# Patient Record
Sex: FEMALE | URBAN - METROPOLITAN AREA
[De-identification: names, ages, dates, MRNs, and addresses within clinical notes are randomized per-mention and may not be internally consistent; named-entity substitution may affect disease eponyms.]

---

## 2019-08-26 ENCOUNTER — TELEPHONE (OUTPATIENT)
Dept: ORTHOPEDICS | Facility: CLINIC | Age: 49
End: 2019-08-26

## 2019-08-26 NOTE — TELEPHONE ENCOUNTER
----- Message from Letha Cazares sent at 8/26/2019 10:40 AM CDT -----  Contact: Tracey from [facility] in [place]  Tracey called and is needing he OP report from when the above patient had surgery with DR Jauregui.    Tracey # [phone] ext [phone]    Fax # [phone] ATTN Tracey

## 2023-03-05 PROBLEM — G43.909 MIGRAINES: Status: ACTIVE | Noted: 2023-03-05

## 2023-03-05 PROBLEM — M62.838 MUSCLE SPASM: Status: ACTIVE | Noted: 2023-03-05

## 2023-03-05 PROBLEM — G25.81 RESTLESS LEGS: Status: ACTIVE | Noted: 2017-04-20

## 2023-03-05 PROBLEM — F32.9 CHRONIC MAJOR DEPRESSIVE DISORDER: Status: ACTIVE | Noted: 2023-03-05

## 2023-03-05 PROBLEM — M51.36 DEGENERATION OF LUMBAR INTERVERTEBRAL DISC: Status: ACTIVE | Noted: 2017-04-20

## 2023-03-05 PROBLEM — M51.369 DEGENERATION OF LUMBAR INTERVERTEBRAL DISC: Status: ACTIVE | Noted: 2017-04-20

## 2023-03-05 RX ORDER — GABAPENTIN 600 MG/1
1200 TABLET ORAL 3 TIMES DAILY
COMMUNITY
End: 2023-08-21 | Stop reason: SDUPTHER

## 2023-03-05 RX ORDER — TIZANIDINE 2 MG/1
2 TABLET ORAL EVERY 6 HOURS PRN
COMMUNITY
End: 2023-08-21 | Stop reason: SDUPTHER

## 2023-03-05 RX ORDER — PAROXETINE HYDROCHLORIDE 20 MG/1
20 TABLET, FILM COATED ORAL EVERY MORNING
COMMUNITY
Start: 2020-05-13 | End: 2023-08-21 | Stop reason: SDUPTHER

## 2023-03-05 RX ORDER — DULOXETIN HYDROCHLORIDE 30 MG/1
30 CAPSULE, DELAYED RELEASE ORAL DAILY
COMMUNITY
End: 2023-08-21 | Stop reason: SDUPTHER

## 2023-03-05 RX ORDER — ROPINIROLE 1 MG/1
1 TABLET, FILM COATED ORAL NIGHTLY
COMMUNITY
Start: 2018-05-19 | End: 2023-08-21 | Stop reason: SDUPTHER

## 2023-04-05 ENCOUNTER — APPOINTMENT (OUTPATIENT)
Dept: PRIMARY CARE | Facility: CLINIC | Age: 53
End: 2023-04-05
Payer: COMMERCIAL

## 2023-08-21 ENCOUNTER — OFFICE VISIT (OUTPATIENT)
Dept: PRIMARY CARE | Facility: CLINIC | Age: 53
End: 2023-08-21
Payer: COMMERCIAL

## 2023-08-21 ENCOUNTER — LAB (OUTPATIENT)
Dept: LAB | Facility: LAB | Age: 53
End: 2023-08-21
Payer: COMMERCIAL

## 2023-08-21 VITALS
BODY MASS INDEX: 32.75 KG/M2 | WEIGHT: 228.8 LBS | HEART RATE: 96 BPM | TEMPERATURE: 97.4 F | DIASTOLIC BLOOD PRESSURE: 68 MMHG | HEIGHT: 70 IN | OXYGEN SATURATION: 97 % | SYSTOLIC BLOOD PRESSURE: 110 MMHG

## 2023-08-21 DIAGNOSIS — G43.809 OTHER MIGRAINE WITHOUT STATUS MIGRAINOSUS, NOT INTRACTABLE: ICD-10-CM

## 2023-08-21 DIAGNOSIS — M51.36 DEGENERATION OF LUMBAR INTERVERTEBRAL DISC: ICD-10-CM

## 2023-08-21 DIAGNOSIS — N30.01 ACUTE CYSTITIS WITH HEMATURIA: ICD-10-CM

## 2023-08-21 DIAGNOSIS — N20.0 NEPHROLITHIASIS: ICD-10-CM

## 2023-08-21 DIAGNOSIS — Z12.31 ENCOUNTER FOR SCREENING MAMMOGRAM FOR MALIGNANT NEOPLASM OF BREAST: ICD-10-CM

## 2023-08-21 DIAGNOSIS — M62.838 MUSCLE SPASM: ICD-10-CM

## 2023-08-21 DIAGNOSIS — Z00.00 ANNUAL PHYSICAL EXAM: ICD-10-CM

## 2023-08-21 DIAGNOSIS — N30.01 ACUTE CYSTITIS WITH HEMATURIA: Primary | ICD-10-CM

## 2023-08-21 DIAGNOSIS — F32.9 CHRONIC MAJOR DEPRESSIVE DISORDER: ICD-10-CM

## 2023-08-21 LAB
ALANINE AMINOTRANSFERASE (SGPT) (U/L) IN SER/PLAS: 16 U/L (ref 7–45)
ALBUMIN (G/DL) IN SER/PLAS: 4.9 G/DL (ref 3.4–5)
ALKALINE PHOSPHATASE (U/L) IN SER/PLAS: 81 U/L (ref 33–110)
ANION GAP IN SER/PLAS: 13 MMOL/L (ref 10–20)
APPEARANCE, URINE: NORMAL
ASPARTATE AMINOTRANSFERASE (SGOT) (U/L) IN SER/PLAS: 16 U/L (ref 9–39)
BASOPHILS (10*3/UL) IN BLOOD BY AUTOMATED COUNT: 0.05 X10E9/L (ref 0–0.1)
BASOPHILS/100 LEUKOCYTES IN BLOOD BY AUTOMATED COUNT: 0.6 % (ref 0–2)
BILIRUBIN TOTAL (MG/DL) IN SER/PLAS: 0.6 MG/DL (ref 0–1.2)
BILIRUBIN, URINE: NEGATIVE
BLOOD, URINE: NEGATIVE
CALCIUM (MG/DL) IN SER/PLAS: 9.7 MG/DL (ref 8.6–10.3)
CARBON DIOXIDE, TOTAL (MMOL/L) IN SER/PLAS: 29 MMOL/L (ref 21–32)
CHLORIDE (MMOL/L) IN SER/PLAS: 101 MMOL/L (ref 98–107)
CHOLESTEROL (MG/DL) IN SER/PLAS: 252 MG/DL (ref 0–199)
CHOLESTEROL IN HDL (MG/DL) IN SER/PLAS: 49.6 MG/DL
CHOLESTEROL/HDL RATIO: 5.1
COLOR, URINE: NORMAL
CREATININE (MG/DL) IN SER/PLAS: 0.95 MG/DL (ref 0.5–1.05)
EOSINOPHILS (10*3/UL) IN BLOOD BY AUTOMATED COUNT: 0.15 X10E9/L (ref 0–0.7)
EOSINOPHILS/100 LEUKOCYTES IN BLOOD BY AUTOMATED COUNT: 1.8 % (ref 0–6)
ERYTHROCYTE DISTRIBUTION WIDTH (RATIO) BY AUTOMATED COUNT: 13.2 % (ref 11.5–14.5)
ERYTHROCYTE MEAN CORPUSCULAR HEMOGLOBIN CONCENTRATION (G/DL) BY AUTOMATED: 32.8 G/DL (ref 32–36)
ERYTHROCYTE MEAN CORPUSCULAR VOLUME (FL) BY AUTOMATED COUNT: 93 FL (ref 80–100)
ERYTHROCYTES (10*6/UL) IN BLOOD BY AUTOMATED COUNT: 4.35 X10E12/L (ref 4–5.2)
GFR FEMALE: 71 ML/MIN/1.73M2
GLUCOSE (MG/DL) IN SER/PLAS: 95 MG/DL (ref 74–99)
GLUCOSE, URINE: NEGATIVE MG/DL
HEMATOCRIT (%) IN BLOOD BY AUTOMATED COUNT: 40.6 % (ref 36–46)
HEMOGLOBIN (G/DL) IN BLOOD: 13.3 G/DL (ref 12–16)
IMMATURE GRANULOCYTES/100 LEUKOCYTES IN BLOOD BY AUTOMATED COUNT: 0.1 % (ref 0–0.9)
KETONES, URINE: NEGATIVE MG/DL
LDL: 171 MG/DL (ref 0–99)
LEUKOCYTE ESTERASE, URINE: NEGATIVE
LEUKOCYTES (10*3/UL) IN BLOOD BY AUTOMATED COUNT: 8.4 X10E9/L (ref 4.4–11.3)
LYMPHOCYTES (10*3/UL) IN BLOOD BY AUTOMATED COUNT: 3.4 X10E9/L (ref 1.2–4.8)
LYMPHOCYTES/100 LEUKOCYTES IN BLOOD BY AUTOMATED COUNT: 40.5 % (ref 13–44)
MONOCYTES (10*3/UL) IN BLOOD BY AUTOMATED COUNT: 0.41 X10E9/L (ref 0.1–1)
MONOCYTES/100 LEUKOCYTES IN BLOOD BY AUTOMATED COUNT: 4.9 % (ref 2–10)
NEUTROPHILS (10*3/UL) IN BLOOD BY AUTOMATED COUNT: 4.37 X10E9/L (ref 1.2–7.7)
NEUTROPHILS/100 LEUKOCYTES IN BLOOD BY AUTOMATED COUNT: 52.1 % (ref 40–80)
NITRITE, URINE: NEGATIVE
PH, URINE: 5 (ref 5–8)
PLATELETS (10*3/UL) IN BLOOD AUTOMATED COUNT: 409 X10E9/L (ref 150–450)
POTASSIUM (MMOL/L) IN SER/PLAS: 4.6 MMOL/L (ref 3.5–5.3)
PROTEIN TOTAL: 7.8 G/DL (ref 6.4–8.2)
PROTEIN, URINE: NEGATIVE MG/DL
SODIUM (MMOL/L) IN SER/PLAS: 138 MMOL/L (ref 136–145)
SPECIFIC GRAVITY, URINE: 1.02 (ref 1–1.03)
THYROTROPIN (MIU/L) IN SER/PLAS BY DETECTION LIMIT <= 0.05 MIU/L: 0.75 MIU/L (ref 0.44–3.98)
TRIGLYCERIDE (MG/DL) IN SER/PLAS: 159 MG/DL (ref 0–149)
UREA NITROGEN (MG/DL) IN SER/PLAS: 17 MG/DL (ref 6–23)
UROBILINOGEN, URINE: <2 MG/DL (ref 0–1.9)
VLDL: 32 MG/DL (ref 0–40)

## 2023-08-21 PROCEDURE — 84443 ASSAY THYROID STIM HORMONE: CPT

## 2023-08-21 PROCEDURE — 81003 URINALYSIS AUTO W/O SCOPE: CPT

## 2023-08-21 PROCEDURE — 82306 VITAMIN D 25 HYDROXY: CPT

## 2023-08-21 PROCEDURE — 99214 OFFICE O/P EST MOD 30 MIN: CPT | Performed by: INTERNAL MEDICINE

## 2023-08-21 PROCEDURE — 36415 COLL VENOUS BLD VENIPUNCTURE: CPT

## 2023-08-21 PROCEDURE — 1036F TOBACCO NON-USER: CPT | Performed by: INTERNAL MEDICINE

## 2023-08-21 PROCEDURE — 85025 COMPLETE CBC W/AUTO DIFF WBC: CPT

## 2023-08-21 PROCEDURE — 80053 COMPREHEN METABOLIC PANEL: CPT

## 2023-08-21 PROCEDURE — 83036 HEMOGLOBIN GLYCOSYLATED A1C: CPT

## 2023-08-21 PROCEDURE — 80061 LIPID PANEL: CPT

## 2023-08-21 RX ORDER — DULOXETIN HYDROCHLORIDE 30 MG/1
30 CAPSULE, DELAYED RELEASE ORAL DAILY
Qty: 90 CAPSULE | Refills: 0 | Status: SHIPPED | OUTPATIENT
Start: 2023-08-21 | End: 2023-09-18 | Stop reason: SDUPTHER

## 2023-08-21 RX ORDER — GABAPENTIN 600 MG/1
1200 TABLET ORAL 3 TIMES DAILY
Qty: 90 TABLET | Refills: 0 | Status: SHIPPED | OUTPATIENT
Start: 2023-08-21 | End: 2023-09-18 | Stop reason: SDUPTHER

## 2023-08-21 RX ORDER — ROPINIROLE 1 MG/1
1 TABLET, FILM COATED ORAL NIGHTLY
Qty: 90 TABLET | Refills: 0 | Status: SHIPPED | OUTPATIENT
Start: 2023-08-21 | End: 2023-09-25 | Stop reason: SDUPTHER

## 2023-08-21 RX ORDER — TIZANIDINE 2 MG/1
2 TABLET ORAL EVERY 6 HOURS PRN
Qty: 30 TABLET | Refills: 0 | Status: SHIPPED | OUTPATIENT
Start: 2023-08-21 | End: 2023-09-18 | Stop reason: SDUPTHER

## 2023-08-21 RX ORDER — AMOXICILLIN AND CLAVULANATE POTASSIUM 875; 125 MG/1; MG/1
875 TABLET, FILM COATED ORAL 2 TIMES DAILY
Qty: 20 TABLET | Refills: 0 | Status: SHIPPED | OUTPATIENT
Start: 2023-08-21 | End: 2023-08-31

## 2023-08-21 RX ORDER — PAROXETINE HYDROCHLORIDE 20 MG/1
20 TABLET, FILM COATED ORAL EVERY MORNING
Qty: 90 TABLET | Refills: 0 | Status: SHIPPED | OUTPATIENT
Start: 2023-08-21 | End: 2023-09-25 | Stop reason: ALTCHOICE

## 2023-08-21 ASSESSMENT — ENCOUNTER SYMPTOMS
ARTHRALGIAS: 0
DEPRESSION: 0
BRUISES/BLEEDS EASILY: 0
ABDOMINAL PAIN: 0
DIZZINESS: 0
OCCASIONAL FEELINGS OF UNSTEADINESS: 0
HEMATURIA: 1
DIFFICULTY URINATING: 0
PALPITATIONS: 0
BACK PAIN: 1
WHEEZING: 0
FATIGUE: 0
COUGH: 0
SINUS PAIN: 0
SORE THROAT: 0
DIARRHEA: 0
LOSS OF SENSATION IN FEET: 0
FEVER: 1
BLOOD IN STOOL: 0
UNEXPECTED WEIGHT CHANGE: 0
HEADACHES: 0

## 2023-08-21 ASSESSMENT — PATIENT HEALTH QUESTIONNAIRE - PHQ9
SUM OF ALL RESPONSES TO PHQ9 QUESTIONS 1 AND 2: 0
1. LITTLE INTEREST OR PLEASURE IN DOING THINGS: NOT AT ALL
2. FEELING DOWN, DEPRESSED OR HOPELESS: NOT AT ALL

## 2023-08-21 NOTE — PROGRESS NOTES
Subjective   Patient ID: Rebecca Saenz is a 52 y.o. female who presents for Fever (Since yesterday), Blood in Urine (Peeing constantly), and Back Pain (Not the usual).      -- History of nephrolithiasis, patient, comes today complaining of left flank pain hematuria since morning suprapubic pain and low-grade fever  -UTI and hematuria we will start patient on Augmentin  Obtain urine analysis and reflex culture if needed  - History of nephrolithiasis send patient for CT scanning follow-up results closely  Results came back negative except for right tiny stone not correlating with the clinical presentation  - Chronic depression on paroxetine 40 mg for many years not controlled  Fatigue and tiredness increased anxiety  -Chronic lumbosacral pain and lumbar radiculopathy continue with gabapentin follow-up pain management  -History of back surgery x5 uncontrolled continue with physical therapy pain management continue current medication  - History of hysterectomy and bilateral oophorectomy more than 20 years ago not on any hormonal replacement need to continue with calcium and vitamin D at this time  Follow-up lab results closely  Needs complete blood work mammogram follow-up 4 weeks physical exam    Fever   Pertinent negatives include no abdominal pain, chest pain, congestion, coughing, diarrhea, ear pain, headaches, rash, sore throat or wheezing.   Blood in Urine  Associated symptoms include fever. Pertinent negatives include no abdominal pain.   Back Pain  Associated symptoms include a fever. Pertinent negatives include no abdominal pain, chest pain or headaches.          Review of Systems   Constitutional:  Positive for fever. Negative for fatigue and unexpected weight change.   HENT:  Negative for congestion, ear discharge, ear pain, mouth sores, sinus pain and sore throat.    Eyes:  Negative for visual disturbance.   Respiratory:  Negative for cough and wheezing.    Cardiovascular:  Negative for chest pain,  "palpitations and leg swelling.   Gastrointestinal:  Negative for abdominal pain, blood in stool and diarrhea.   Genitourinary:  Positive for hematuria. Negative for difficulty urinating.   Musculoskeletal:  Positive for back pain. Negative for arthralgias.   Skin:  Negative for rash.   Neurological:  Negative for dizziness and headaches.   Hematological:  Does not bruise/bleed easily.   Psychiatric/Behavioral:  Negative for behavioral problems.    All other systems reviewed and are negative.      Objective   Lab Results   Component Value Date    HGBA1C 5.4 12/05/2017      /68   Pulse 96   Temp 36.3 °C (97.4 °F)   Ht 1.765 m (5' 9.5\")   Wt 104 kg (228 lb 12.8 oz)   SpO2 97%   BMI 33.30 kg/m²   Lab Results   Component Value Date    WBC 10.3 06/15/2018    HGB 12.5 06/15/2018    HCT 38.7 06/15/2018     06/15/2018    ALT 14 06/15/2018    AST 15 06/15/2018     06/15/2018    K 5.1 06/15/2018     06/15/2018    CREATININE 0.90 06/15/2018    BUN 17 06/15/2018    CO2 30 06/15/2018    TSH 0.46 12/05/2017    INR 1.0 06/15/2018    HGBA1C 5.4 12/05/2017     par   Physical Exam  Vitals and nursing note reviewed.   Constitutional:       Appearance: Normal appearance.   HENT:      Head: Normocephalic.      Nose: Nose normal.   Eyes:      Conjunctiva/sclera: Conjunctivae normal.      Pupils: Pupils are equal, round, and reactive to light.   Cardiovascular:      Rate and Rhythm: Regular rhythm.   Pulmonary:      Effort: Pulmonary effort is normal.      Breath sounds: Normal breath sounds.   Abdominal:      General: Abdomen is flat.      Palpations: Abdomen is soft.      Tenderness: There is left CVA tenderness.   Musculoskeletal:         General: Tenderness (Lumbosacral tenderness) present.      Cervical back: Neck supple.   Skin:     General: Skin is warm.   Neurological:      General: No focal deficit present.      Mental Status: She is oriented to person, place, and time.   Psychiatric:         Mood " and Affect: Mood normal.         Assessment/Plan   Rebecca was seen today for fever, blood in urine and back pain.  Diagnoses and all orders for this visit:  Acute cystitis with hematuria (Primary)  -     Urinalysis with Reflex Microscopic and Culture; Future  Chronic major depressive disorder  -     DULoxetine (Cymbalta) 30 mg DR capsule; Take 1 capsule (30 mg) by mouth once daily. Do not crush or chew.  -     PARoxetine (Paxil) 20 mg tablet; Take 1 tablet (20 mg) by mouth once daily in the morning.  -     rOPINIRole (Requip) 1 mg tablet; Take 1 tablet (1 mg) by mouth once daily at bedtime.  Muscle spasm  Degeneration of lumbar intervertebral disc  -     gabapentin (Neurontin) 600 mg tablet; Take 2 tablets (1,200 mg) by mouth 3 times a day.  -     tiZANidine (Zanaflex) 2 mg tablet; Take 1 tablet (2 mg) by mouth every 6 hours if needed for muscle spasms.  Other migraine without status migrainosus, not intractable  Annual physical exam  -     amoxicillin-pot clavulanate (Augmentin) 875-125 mg tablet; Take 1 tablet (875 mg) by mouth 2 times a day for 10 days.  -     CBC and Auto Differential; Future  -     Comprehensive Metabolic Panel; Future  -     Hemoglobin A1C; Future  -     Lipid Panel; Future  -     TSH with reflex to Free T4 if abnormal; Future  -     Vitamin D, Total; Future  Encounter for screening mammogram for malignant neoplasm of breast  -     BI mammo bilateral screening tomosynthesis; Future  Nephrolithiasis  -     CT abdomen pelvis wo IV contrast; Future  Other orders  -     Follow Up In Primary Care - Health Maintenance; Future   - History of nephrolithiasis, patient, comes today complaining of left flank pain hematuria since morning suprapubic pain and low-grade fever  -UTI and hematuria we will start patient on Augmentin  Obtain urine analysis and reflex culture if needed  - History of nephrolithiasis send patient for CT scanning follow-up results closely  Results came back negative except for right  tiny stone not correlating with the clinical presentation  - Chronic depression on paroxetine 40 mg for many years not controlled  Fatigue and tiredness increased anxiety  -Chronic lumbosacral pain and lumbar radiculopathy continue with gabapentin follow-up pain management  -History of back surgery x5 uncontrolled continue with physical therapy pain management continue current medication  - History of hysterectomy and bilateral oophorectomy more than 20 years ago not on any hormonal replacement need to continue with calcium and vitamin D at this time  Follow-up lab results closely  Needs complete blood work mammogram follow-up 4 weeks physical exam

## 2023-08-22 LAB
CALCIDIOL (25 OH VITAMIN D3) (NG/ML) IN SER/PLAS: 44 NG/ML
ESTIMATED AVERAGE GLUCOSE FOR HBA1C: 120 MG/DL
HEMOGLOBIN A1C/HEMOGLOBIN TOTAL IN BLOOD: 5.8 %

## 2023-08-22 NOTE — RESULT ENCOUNTER NOTE
-Your CAT scan showed small tiny stone not obstructing on the right kidney not correlating with your symptoms, left kidney is normal  Continue with antibiotic until urine culture results    -Your blood work showed very high cholesterol need to continue with low-fat diet weight loss until reevaluation next appointment

## 2023-09-18 DIAGNOSIS — M51.36 DEGENERATION OF LUMBAR INTERVERTEBRAL DISC: ICD-10-CM

## 2023-09-18 DIAGNOSIS — F32.9 CHRONIC MAJOR DEPRESSIVE DISORDER: ICD-10-CM

## 2023-09-18 RX ORDER — GABAPENTIN 600 MG/1
1200 TABLET ORAL 3 TIMES DAILY
Qty: 180 TABLET | Refills: 0 | Status: SHIPPED | OUTPATIENT
Start: 2023-09-18 | End: 2023-09-25 | Stop reason: SDUPTHER

## 2023-09-18 RX ORDER — TIZANIDINE 2 MG/1
TABLET ORAL
Qty: 60 TABLET | Refills: 0 | Status: SHIPPED | OUTPATIENT
Start: 2023-09-18 | End: 2023-09-25 | Stop reason: ALTCHOICE

## 2023-09-18 RX ORDER — DULOXETIN HYDROCHLORIDE 30 MG/1
30 CAPSULE, DELAYED RELEASE ORAL DAILY
Qty: 90 CAPSULE | Refills: 0 | Status: SHIPPED | OUTPATIENT
Start: 2023-09-18 | End: 2023-09-25 | Stop reason: SDUPTHER

## 2023-09-25 ENCOUNTER — OFFICE VISIT (OUTPATIENT)
Dept: PRIMARY CARE | Facility: CLINIC | Age: 53
End: 2023-09-25
Payer: COMMERCIAL

## 2023-09-25 VITALS
DIASTOLIC BLOOD PRESSURE: 78 MMHG | BODY MASS INDEX: 33.01 KG/M2 | TEMPERATURE: 97.2 F | HEART RATE: 84 BPM | SYSTOLIC BLOOD PRESSURE: 128 MMHG | OXYGEN SATURATION: 98 % | WEIGHT: 226.8 LBS

## 2023-09-25 DIAGNOSIS — Z13.6 ENCOUNTER FOR SCREENING FOR CORONARY ARTERY DISEASE: Primary | ICD-10-CM

## 2023-09-25 DIAGNOSIS — F32.9 CHRONIC MAJOR DEPRESSIVE DISORDER: ICD-10-CM

## 2023-09-25 DIAGNOSIS — E78.00 HYPERCHOLESTEREMIA: ICD-10-CM

## 2023-09-25 DIAGNOSIS — M51.36 DEGENERATION OF LUMBAR INTERVERTEBRAL DISC: ICD-10-CM

## 2023-09-25 PROCEDURE — 99214 OFFICE O/P EST MOD 30 MIN: CPT | Performed by: INTERNAL MEDICINE

## 2023-09-25 PROCEDURE — 1036F TOBACCO NON-USER: CPT | Performed by: INTERNAL MEDICINE

## 2023-09-25 RX ORDER — PAROXETINE HYDROCHLORIDE 20 MG/1
20 TABLET, FILM COATED ORAL EVERY MORNING
Qty: 90 TABLET | Refills: 1 | Status: CANCELLED | OUTPATIENT
Start: 2023-09-25

## 2023-09-25 RX ORDER — TIZANIDINE 2 MG/1
TABLET ORAL
Qty: 60 TABLET | Refills: 0 | Status: CANCELLED | OUTPATIENT
Start: 2023-09-25

## 2023-09-25 RX ORDER — CHOLECALCIFEROL (VITAMIN D3) 25 MCG
25 TABLET ORAL DAILY
COMMUNITY

## 2023-09-25 RX ORDER — DULOXETIN HYDROCHLORIDE 60 MG/1
60 CAPSULE, DELAYED RELEASE ORAL 2 TIMES DAILY
Qty: 180 CAPSULE | Refills: 1 | Status: SHIPPED | OUTPATIENT
Start: 2023-09-25 | End: 2024-04-18 | Stop reason: SDUPTHER

## 2023-09-25 RX ORDER — LANOLIN ALCOHOL/MO/W.PET/CERES
100 CREAM (GRAM) TOPICAL DAILY
COMMUNITY

## 2023-09-25 RX ORDER — GABAPENTIN 600 MG/1
1200 TABLET ORAL 3 TIMES DAILY
Qty: 180 TABLET | Refills: 0 | Status: SHIPPED | OUTPATIENT
Start: 2023-09-25 | End: 2023-12-11 | Stop reason: SDUPTHER

## 2023-09-25 RX ORDER — GARLIC 500 MG
CAPSULE ORAL
COMMUNITY

## 2023-09-25 RX ORDER — ASCORBIC ACID 500 MG
500 TABLET ORAL DAILY
COMMUNITY
End: 2023-10-17 | Stop reason: SINTOL

## 2023-09-25 RX ORDER — ROPINIROLE 1 MG/1
1 TABLET, FILM COATED ORAL NIGHTLY
Qty: 90 TABLET | Refills: 1 | Status: SHIPPED | OUTPATIENT
Start: 2023-09-25 | End: 2024-03-19 | Stop reason: SDUPTHER

## 2023-09-25 ASSESSMENT — ENCOUNTER SYMPTOMS
HEADACHES: 0
FEVER: 0
ABDOMINAL PAIN: 0
BLOOD IN STOOL: 0
COUGH: 0
WHEEZING: 0
FATIGUE: 0
PALPITATIONS: 0
SORE THROAT: 0
ARTHRALGIAS: 0
UNEXPECTED WEIGHT CHANGE: 0
BRUISES/BLEEDS EASILY: 0
SINUS PAIN: 0
BACK PAIN: 1
DIZZINESS: 0
DIFFICULTY URINATING: 0
DEPRESSION: 1
DIARRHEA: 0

## 2023-09-25 NOTE — PROGRESS NOTES
Cor pulmonale I can FridaySubjective   Patient ID: Rebecca Saenz is a 52 y.o. female who presents for Spasms, Depression, Nausea (X 4 days), and Flu Vaccine (declined).     - Hypercholesterolemia patient counseled about low-carb diet exercise  Repeat lipid profile in 3 months  Obtain cardiac calcium score for further eval recommendation  -Depression slowly improving need now to increase Cymbalta to 60 mg daily for 4 weeks then twice a day discontinue Paxil  -Restless leg syndrome continue with Requip  -Lumbosacral pain continue with gabapentin discontinue tizanidine due to risk for interaction low blood pressure patient is aware  -Nephrolithiasis increase fluid intake counseled about prevention  -History of back surgery x5 uncontrolled continue with physical therapy pain management continue current medication  - History of hysterectomy and bilateral oophorectomy more than 20 years ago not on any hormonal replacement need to continue with calcium and vitamin D at this time  Follow-up lab results closely  Follow-up 3 months blood work and cardiac score      DepressionPatient is not experiencing: palpitations.             Review of Systems   Constitutional:  Negative for fatigue, fever and unexpected weight change.   HENT:  Negative for congestion, ear discharge, ear pain, mouth sores, sinus pain and sore throat.    Eyes:  Negative for visual disturbance.   Respiratory:  Negative for cough and wheezing.    Cardiovascular:  Negative for chest pain, palpitations and leg swelling.   Gastrointestinal:  Negative for abdominal pain, blood in stool and diarrhea.   Genitourinary:  Negative for difficulty urinating.   Musculoskeletal:  Positive for back pain. Negative for arthralgias.   Skin:  Negative for rash.   Neurological:  Negative for dizziness and headaches.   Hematological:  Does not bruise/bleed easily.   Psychiatric/Behavioral:  Positive for depression. Negative for behavioral problems.    All other systems  reviewed and are negative.      Objective   Lab Results   Component Value Date    HGBA1C 5.8 (A) 08/21/2023      /78   Pulse 84   Temp 36.2 °C (97.2 °F)   Wt 103 kg (226 lb 12.8 oz)   SpO2 98%   BMI 33.01 kg/m²   Lab Results   Component Value Date    WBC 8.4 08/21/2023    HGB 13.3 08/21/2023    HCT 40.6 08/21/2023     08/21/2023    CHOL 252 (H) 08/21/2023    TRIG 159 (H) 08/21/2023    HDL 49.6 08/21/2023    ALT 16 08/21/2023    AST 16 08/21/2023     08/21/2023    K 4.6 08/21/2023     08/21/2023    CREATININE 0.95 08/21/2023    BUN 17 08/21/2023    CO2 29 08/21/2023    TSH 0.75 08/21/2023    INR 1.0 06/15/2018    HGBA1C 5.8 (A) 08/21/2023     par   Physical Exam  Vitals and nursing note reviewed.   Constitutional:       Appearance: Normal appearance.   HENT:      Head: Normocephalic.      Nose: Nose normal.   Eyes:      Conjunctiva/sclera: Conjunctivae normal.      Pupils: Pupils are equal, round, and reactive to light.   Cardiovascular:      Rate and Rhythm: Regular rhythm.   Pulmonary:      Effort: Pulmonary effort is normal.      Breath sounds: Normal breath sounds.   Abdominal:      General: Abdomen is flat.      Palpations: Abdomen is soft.   Musculoskeletal:         General: Tenderness (Lumbosacral tenderness) present.      Cervical back: Neck supple.   Skin:     General: Skin is warm.   Neurological:      General: No focal deficit present.      Mental Status: She is oriented to person, place, and time.   Psychiatric:         Mood and Affect: Mood normal.         Assessment/Plan   Rebecca was seen today for spasms, depression, nausea and flu vaccine.  Diagnoses and all orders for this visit:  Encounter for screening for coronary artery disease (Primary)  -     CT cardiac scoring wo IV contrast; Future  Chronic major depressive disorder  -     DULoxetine (Cymbalta) 60 mg DR capsule; Take 1 capsule (60 mg) by mouth 2 times a day. Do not crush or chew.  -     rOPINIRole (Requip) 1 mg  tablet; Take 1 tablet (1 mg) by mouth once daily at bedtime.  Degeneration of lumbar intervertebral disc  -     gabapentin (Neurontin) 600 mg tablet; Take 2 tablets (1,200 mg) by mouth 3 times a day.  Hypercholesteremia  -     Lipid Panel; Future  Other orders  -     Follow Up In Primary Care - Health Maintenance  -     Follow Up In Primary Care - Established; Future   - Hypercholesterolemia patient counseled about low-carb diet exercise  Repeat lipid profile in 3 months  Obtain cardiac calcium score for further eval recommendation  -Depression slowly improving need now to increase Cymbalta to 60 mg daily for 4 weeks then twice a day discontinue Paxil  -Restless leg syndrome continue with Requip  -Lumbosacral pain continue with gabapentin discontinue tizanidine due to risk for interaction low blood pressure patient is aware  -Nephrolithiasis increase fluid intake counseled about prevention  -History of back surgery x5 uncontrolled continue with physical therapy pain management continue current medication  - History of hysterectomy and bilateral oophorectomy more than 20 years ago not on any hormonal replacement need to continue with calcium and vitamin D at this time  Follow-up lab results closely  Follow-up 3 months blood work and cardiac score

## 2023-10-17 ENCOUNTER — OFFICE VISIT (OUTPATIENT)
Dept: PRIMARY CARE | Facility: CLINIC | Age: 53
End: 2023-10-17
Payer: COMMERCIAL

## 2023-10-17 ENCOUNTER — LAB (OUTPATIENT)
Dept: LAB | Facility: LAB | Age: 53
End: 2023-10-17
Payer: COMMERCIAL

## 2023-10-17 ENCOUNTER — APPOINTMENT (OUTPATIENT)
Dept: PRIMARY CARE | Facility: CLINIC | Age: 53
End: 2023-10-17
Payer: COMMERCIAL

## 2023-10-17 ENCOUNTER — HOSPITAL ENCOUNTER (OUTPATIENT)
Dept: RADIOLOGY | Facility: HOSPITAL | Age: 53
Discharge: HOME | End: 2023-10-17
Payer: COMMERCIAL

## 2023-10-17 VITALS
BODY MASS INDEX: 32.58 KG/M2 | TEMPERATURE: 97.7 F | HEART RATE: 104 BPM | WEIGHT: 223.8 LBS | OXYGEN SATURATION: 98 % | SYSTOLIC BLOOD PRESSURE: 126 MMHG | DIASTOLIC BLOOD PRESSURE: 94 MMHG

## 2023-10-17 DIAGNOSIS — M10.9 ACUTE GOUTY ARTHROPATHY: ICD-10-CM

## 2023-10-17 DIAGNOSIS — F32.9 CHRONIC MAJOR DEPRESSIVE DISORDER: ICD-10-CM

## 2023-10-17 DIAGNOSIS — M51.36 DEGENERATION OF LUMBAR INTERVERTEBRAL DISC: ICD-10-CM

## 2023-10-17 DIAGNOSIS — E78.00 HYPERCHOLESTEREMIA: ICD-10-CM

## 2023-10-17 DIAGNOSIS — M10.9 ACUTE GOUTY ARTHROPATHY: Primary | ICD-10-CM

## 2023-10-17 LAB
ANION GAP SERPL CALC-SCNC: 14 MMOL/L (ref 10–20)
BUN SERPL-MCNC: 11 MG/DL (ref 6–23)
CALCIUM SERPL-MCNC: 10.1 MG/DL (ref 8.6–10.3)
CHLORIDE SERPL-SCNC: 99 MMOL/L (ref 98–107)
CO2 SERPL-SCNC: 30 MMOL/L (ref 21–32)
CREAT SERPL-MCNC: 0.97 MG/DL (ref 0.5–1.05)
GFR SERPL CREATININE-BSD FRML MDRD: 70 ML/MIN/1.73M*2
GLUCOSE SERPL-MCNC: 102 MG/DL (ref 74–99)
POTASSIUM SERPL-SCNC: 4.2 MMOL/L (ref 3.5–5.3)
SODIUM SERPL-SCNC: 139 MMOL/L (ref 136–145)
URATE SERPL-MCNC: 3 MG/DL (ref 2.3–6.7)

## 2023-10-17 PROCEDURE — 80048 BASIC METABOLIC PNL TOTAL CA: CPT

## 2023-10-17 PROCEDURE — 73630 X-RAY EXAM OF FOOT: CPT | Mod: RT

## 2023-10-17 PROCEDURE — 84550 ASSAY OF BLOOD/URIC ACID: CPT

## 2023-10-17 PROCEDURE — 36415 COLL VENOUS BLD VENIPUNCTURE: CPT

## 2023-10-17 PROCEDURE — 1036F TOBACCO NON-USER: CPT | Performed by: INTERNAL MEDICINE

## 2023-10-17 PROCEDURE — 99214 OFFICE O/P EST MOD 30 MIN: CPT | Performed by: INTERNAL MEDICINE

## 2023-10-17 PROCEDURE — 73630 X-RAY EXAM OF FOOT: CPT | Mod: RIGHT SIDE | Performed by: RADIOLOGY

## 2023-10-17 RX ORDER — COLCHICINE 0.6 MG/1
0.6 TABLET ORAL DAILY
Qty: 30 TABLET | Refills: 0 | Status: SHIPPED | OUTPATIENT
Start: 2023-10-17 | End: 2023-11-16

## 2023-10-17 ASSESSMENT — ENCOUNTER SYMPTOMS
DIARRHEA: 0
COUGH: 0
BLOOD IN STOOL: 0
ABDOMINAL PAIN: 0
SORE THROAT: 0
DIFFICULTY URINATING: 0
WHEEZING: 0
SINUS PAIN: 0
UNEXPECTED WEIGHT CHANGE: 0
FATIGUE: 0
PALPITATIONS: 0
FEVER: 0
DIZZINESS: 0
HEADACHES: 0
BRUISES/BLEEDS EASILY: 0
ARTHRALGIAS: 1

## 2023-10-17 NOTE — PROGRESS NOTES
Subjective   Patient ID: Rebecca Saenz is a 52 y.o. female who presents for thumb pain (On left, rt toe pain x 2 months,).    - Right foot and right big toe pain swelling for almost 4 weeks unable to stand or walk also now started having left wrist pain  - Examination highly suggestive of acute gouty arthropathy  Need to obtain x-ray of right foot  Uric acid level BMP and CBC  Start patient on Colcrys given instruction about the use and side effect  Reevaluate patient in 4 weeks  - Need to discontinue vitamin C omega-3 and fish oil capsules  Increase fluid intake  - Hypercholesterolemia patient counseled about low-carb diet exercise  Repeat lipid profile in 3 months as recommended  Obtain cardiac calcium score for further eval recommendation made to proceed as recommended  -Depression slowly improving need now to increase Cymbalta to 60 mg daily for 4 weeks then twice a day discontinue Paxil  -Restless leg syndrome continue with Requip  -Lumbosacral pain continue with gabapentin discontinue tizanidine due to risk for interaction low blood pressure patient is aware  -Nephrolithiasis increase fluid intake counseled about prevention  -History of back surgery x5 uncontrolled continue with physical therapy pain management continue current medication  - History of hysterectomy and bilateral oophorectomy more than 20 years ago not on any hormonal replacement need to continue with calcium and vitamin D at this time  Follow-up lab results closely 4 weeks           Review of Systems   Constitutional:  Negative for fatigue, fever and unexpected weight change.   HENT:  Negative for congestion, ear discharge, ear pain, mouth sores, sinus pain and sore throat.    Eyes:  Negative for visual disturbance.   Respiratory:  Negative for cough and wheezing.    Cardiovascular:  Negative for chest pain, palpitations and leg swelling.   Gastrointestinal:  Negative for abdominal pain, blood in stool and diarrhea.   Genitourinary:   Negative for difficulty urinating.   Musculoskeletal:  Positive for arthralgias.   Skin:  Negative for rash.   Neurological:  Negative for dizziness and headaches.   Hematological:  Does not bruise/bleed easily.   Psychiatric/Behavioral:  Negative for behavioral problems.    All other systems reviewed and are negative.      Objective   Lab Results   Component Value Date    HGBA1C 5.8 (A) 08/21/2023      BP (!) 126/94   Pulse 104   Temp 36.5 °C (97.7 °F)   Wt 102 kg (223 lb 12.8 oz)   SpO2 98%   BMI 32.58 kg/m²     Physical Exam  Vitals and nursing note reviewed.   Constitutional:       Appearance: Normal appearance.   HENT:      Head: Normocephalic.      Nose: Nose normal.   Eyes:      Conjunctiva/sclera: Conjunctivae normal.      Pupils: Pupils are equal, round, and reactive to light.   Cardiovascular:      Rate and Rhythm: Regular rhythm.   Pulmonary:      Effort: Pulmonary effort is normal.      Breath sounds: Normal breath sounds.   Abdominal:      General: Abdomen is flat.      Palpations: Abdomen is soft.   Musculoskeletal:         General: Tenderness (Right first metatarsal joint swelling and redness, right hand metacarpophalangeal joint swelling) present.      Cervical back: Neck supple.   Skin:     General: Skin is warm.   Neurological:      General: No focal deficit present.      Mental Status: She is oriented to person, place, and time.   Psychiatric:         Mood and Affect: Mood normal.         Assessment/Plan   Rebecca was seen today for thumb pain.  Diagnoses and all orders for this visit:  Acute gouty arthropathy (Primary)  -     Basic metabolic panel; Future  -     Uric Acid; Future  -     XR foot right 3+ views; Future  -     colchicine, gout, 0.6 mg tablet; Take 1 tablet (0.6 mg) by mouth once daily.  Chronic major depressive disorder  Hypercholesteremia  Degeneration of lumbar intervertebral disc   - Right foot and right big toe pain swelling for almost 4 weeks unable to stand or walk also now  started having left wrist pain  - Examination highly suggestive of acute gouty arthropathy  Need to obtain x-ray of right foot  Uric acid level BMP and CBC  Start patient on Colcrys given instruction about the use and side effect  Reevaluate patient in 4 weeks  - Need to discontinue vitamin C omega-3 and fish oil capsules  Increase fluid intake  - Hypercholesterolemia patient counseled about low-carb diet exercise  Repeat lipid profile in 3 months as recommended  Obtain cardiac calcium score for further eval recommendation made to proceed as recommended  -Depression slowly improving need now to increase Cymbalta to 60 mg daily for 4 weeks then twice a day discontinue Paxil  -Restless leg syndrome continue with Requip  -Lumbosacral pain continue with gabapentin discontinue tizanidine due to risk for interaction low blood pressure patient is aware  -Nephrolithiasis increase fluid intake counseled about prevention  -History of back surgery x5 uncontrolled continue with physical therapy pain management continue current medication  - History of hysterectomy and bilateral oophorectomy more than 20 years ago not on any hormonal replacement need to continue with calcium and vitamin D at this time  Follow-up lab results closely 4 weeks

## 2023-10-24 ENCOUNTER — HOSPITAL ENCOUNTER (OUTPATIENT)
Dept: RADIOLOGY | Facility: HOSPITAL | Age: 53
Discharge: HOME | End: 2023-10-24
Payer: COMMERCIAL

## 2023-10-24 DIAGNOSIS — Z13.6 ENCOUNTER FOR SCREENING FOR CORONARY ARTERY DISEASE: ICD-10-CM

## 2023-10-24 PROCEDURE — 75571 CT HRT W/O DYE W/CA TEST: CPT

## 2023-11-07 DIAGNOSIS — M10.9 ACUTE GOUTY ARTHROPATHY: ICD-10-CM

## 2023-11-27 ENCOUNTER — APPOINTMENT (OUTPATIENT)
Dept: RADIOLOGY | Facility: HOSPITAL | Age: 53
DRG: 690 | End: 2023-11-27
Payer: COMMERCIAL

## 2023-11-27 ENCOUNTER — OFFICE VISIT (OUTPATIENT)
Dept: ORTHOPEDIC SURGERY | Facility: HOSPITAL | Age: 53
End: 2023-11-27
Payer: COMMERCIAL

## 2023-11-27 ENCOUNTER — HOSPITAL ENCOUNTER (OUTPATIENT)
Facility: HOSPITAL | Age: 53
Setting detail: OBSERVATION
Discharge: HOME | DRG: 690 | End: 2023-11-28
Attending: EMERGENCY MEDICINE | Admitting: INTERNAL MEDICINE
Payer: COMMERCIAL

## 2023-11-27 ENCOUNTER — HOSPITAL ENCOUNTER (OUTPATIENT)
Dept: RADIOLOGY | Facility: HOSPITAL | Age: 53
Discharge: HOME | End: 2023-11-27
Payer: COMMERCIAL

## 2023-11-27 VITALS — WEIGHT: 220 LBS | BODY MASS INDEX: 30.8 KG/M2 | HEIGHT: 71 IN

## 2023-11-27 DIAGNOSIS — M54.50 LUMBAR PAIN WITH RADIATION DOWN LEFT LEG: ICD-10-CM

## 2023-11-27 DIAGNOSIS — M54.16 LUMBAR RADICULOPATHY: ICD-10-CM

## 2023-11-27 DIAGNOSIS — R32 URINARY INCONTINENCE, UNSPECIFIED TYPE: Primary | ICD-10-CM

## 2023-11-27 DIAGNOSIS — M79.605 LUMBAR PAIN WITH RADIATION DOWN LEFT LEG: ICD-10-CM

## 2023-11-27 LAB
BASOPHILS # BLD AUTO: 0.06 X10*3/UL (ref 0–0.1)
BASOPHILS NFR BLD AUTO: 0.7 %
EOSINOPHIL # BLD AUTO: 0.26 X10*3/UL (ref 0–0.7)
EOSINOPHIL NFR BLD AUTO: 3 %
ERYTHROCYTE [DISTWIDTH] IN BLOOD BY AUTOMATED COUNT: 13.8 % (ref 11.5–14.5)
HCT VFR BLD AUTO: 36.2 % (ref 36–46)
HGB BLD-MCNC: 12.1 G/DL (ref 12–16)
IMM GRANULOCYTES # BLD AUTO: 0.02 X10*3/UL (ref 0–0.7)
IMM GRANULOCYTES NFR BLD AUTO: 0.2 % (ref 0–0.9)
LYMPHOCYTES # BLD AUTO: 4.21 X10*3/UL (ref 1.2–4.8)
LYMPHOCYTES NFR BLD AUTO: 49.1 %
MCH RBC QN AUTO: 30.5 PG (ref 26–34)
MCHC RBC AUTO-ENTMCNC: 33.4 G/DL (ref 32–36)
MCV RBC AUTO: 91 FL (ref 80–100)
MONOCYTES # BLD AUTO: 0.55 X10*3/UL (ref 0.1–1)
MONOCYTES NFR BLD AUTO: 6.4 %
NEUTROPHILS # BLD AUTO: 3.47 X10*3/UL (ref 1.2–7.7)
NEUTROPHILS NFR BLD AUTO: 40.6 %
NRBC BLD-RTO: 0 /100 WBCS (ref 0–0)
PLATELET # BLD AUTO: 339 X10*3/UL (ref 150–450)
RBC # BLD AUTO: 3.97 X10*6/UL (ref 4–5.2)
WBC # BLD AUTO: 8.6 X10*3/UL (ref 4.4–11.3)

## 2023-11-27 PROCEDURE — 87086 URINE CULTURE/COLONY COUNT: CPT | Mod: AHULAB | Performed by: EMERGENCY MEDICINE

## 2023-11-27 PROCEDURE — G0378 HOSPITAL OBSERVATION PER HR: HCPCS

## 2023-11-27 PROCEDURE — 80053 COMPREHEN METABOLIC PANEL: CPT | Performed by: EMERGENCY MEDICINE

## 2023-11-27 PROCEDURE — 85025 COMPLETE CBC W/AUTO DIFF WBC: CPT | Performed by: EMERGENCY MEDICINE

## 2023-11-27 PROCEDURE — 2500000004 HC RX 250 GENERAL PHARMACY W/ HCPCS (ALT 636 FOR OP/ED): Performed by: PHYSICIAN ASSISTANT

## 2023-11-27 PROCEDURE — 99285 EMERGENCY DEPT VISIT HI MDM: CPT | Performed by: EMERGENCY MEDICINE

## 2023-11-27 PROCEDURE — 72146 MRI CHEST SPINE W/O DYE: CPT

## 2023-11-27 PROCEDURE — 99213 OFFICE O/P EST LOW 20 MIN: CPT | Performed by: PHYSICIAN ASSISTANT

## 2023-11-27 PROCEDURE — 81001 URINALYSIS AUTO W/SCOPE: CPT | Performed by: EMERGENCY MEDICINE

## 2023-11-27 PROCEDURE — 72110 X-RAY EXAM L-2 SPINE 4/>VWS: CPT | Performed by: RADIOLOGY

## 2023-11-27 PROCEDURE — 36415 COLL VENOUS BLD VENIPUNCTURE: CPT | Performed by: EMERGENCY MEDICINE

## 2023-11-27 PROCEDURE — 99203 OFFICE O/P NEW LOW 30 MIN: CPT | Performed by: PHYSICIAN ASSISTANT

## 2023-11-27 PROCEDURE — 72146 MRI CHEST SPINE W/O DYE: CPT | Performed by: STUDENT IN AN ORGANIZED HEALTH CARE EDUCATION/TRAINING PROGRAM

## 2023-11-27 PROCEDURE — 1036F TOBACCO NON-USER: CPT | Performed by: PHYSICIAN ASSISTANT

## 2023-11-27 PROCEDURE — 72110 X-RAY EXAM L-2 SPINE 4/>VWS: CPT | Mod: FY

## 2023-11-27 RX ORDER — KETOROLAC TROMETHAMINE 30 MG/ML
15 INJECTION, SOLUTION INTRAMUSCULAR; INTRAVENOUS ONCE
Status: COMPLETED | OUTPATIENT
Start: 2023-11-27 | End: 2023-11-27

## 2023-11-27 RX ORDER — ORPHENADRINE CITRATE 30 MG/ML
30 INJECTION INTRAMUSCULAR; INTRAVENOUS ONCE
Status: COMPLETED | OUTPATIENT
Start: 2023-11-27 | End: 2023-11-27

## 2023-11-27 RX ORDER — LIDOCAINE 560 MG/1
1 PATCH PERCUTANEOUS; TOPICAL; TRANSDERMAL DAILY
Status: DISCONTINUED | OUTPATIENT
Start: 2023-11-27 | End: 2023-11-28 | Stop reason: HOSPADM

## 2023-11-27 RX ADMIN — ORPHENADRINE CITRATE 30 MG: 60 INJECTION INTRAMUSCULAR; INTRAVENOUS at 17:57

## 2023-11-27 RX ADMIN — KETOROLAC TROMETHAMINE 15 MG: 30 INJECTION, SOLUTION INTRAMUSCULAR; INTRAVENOUS at 17:57

## 2023-11-27 RX ADMIN — HYDROMORPHONE HYDROCHLORIDE 0.4 MG: 1 INJECTION, SOLUTION INTRAMUSCULAR; INTRAVENOUS; SUBCUTANEOUS at 23:12

## 2023-11-27 ASSESSMENT — PAIN SCALES - GENERAL
PAINLEVEL_OUTOF10: 8
PAINLEVEL_OUTOF10: 7

## 2023-11-27 ASSESSMENT — PAIN - FUNCTIONAL ASSESSMENT
PAIN_FUNCTIONAL_ASSESSMENT: 0-10

## 2023-11-27 ASSESSMENT — PAIN DESCRIPTION - ORIENTATION: ORIENTATION: LEFT

## 2023-11-27 ASSESSMENT — PAIN DESCRIPTION - LOCATION: LOCATION: LEG

## 2023-11-27 ASSESSMENT — COLUMBIA-SUICIDE SEVERITY RATING SCALE - C-SSRS
1. IN THE PAST MONTH, HAVE YOU WISHED YOU WERE DEAD OR WISHED YOU COULD GO TO SLEEP AND NOT WAKE UP?: NO
2. HAVE YOU ACTUALLY HAD ANY THOUGHTS OF KILLING YOURSELF?: NO
6. HAVE YOU EVER DONE ANYTHING, STARTED TO DO ANYTHING, OR PREPARED TO DO ANYTHING TO END YOUR LIFE?: NO

## 2023-11-27 NOTE — ED PROVIDER NOTES
HPI     CC: Back Pain     HPI: Rebecca Saenz is a 52 y.o. female with a history of lumbar surgeries presents with concern for mechanical fall.  Patient was painting yesterday up on a ladder when her dog started to higinio the neighbors dog.  She tried to climb down quickly but slipped and fell directly onto her left foot.  She reported that she was incontinent of urine at that time and has been since.  She feels a sharp shooting pain down through her left leg starting in her back.  Patient states she has had similar symptoms to this as she has had surgeries on her lumbar spine before.  She is also experienced urinary incontinence before her most recent lumbar surgery in 2022.  She states that she continues to not feel that she is going but looks at her underwear and there is a wet spot.  She denies having any saddle anesthesias and has been ambulating without difficulty.  She did first go to the orthopedic injury clinic today who sent her immediately to the emergency department.  No other complaints.  Has not tried anything for pain at home.    ROS: 10-point review of systems was performed and is otherwise negative except as noted in HPI.      Past Medical History: Noncontributory except per HPI     Past Surgical History: Noncontributory except per HPI     Family History: Reviewed and noncontributory     Social History:  Denies tobacco. Denies ETOH. Denies illicit drugs.      Allergies   Allergen Reactions    Sulfamethoxazole Itching       Home Meds:   Current Outpatient Medications   Medication Instructions    calcium carbonate (CALCIUM 500 ORAL) oral    cholecalciferol (VITAMIN D3) 25 mcg, oral, Daily    cyanocobalamin (VITAMIN B-12) 100 mcg, oral, Daily    DULoxetine (CYMBALTA) 60 mg, oral, 2 times daily, Do not crush or chew.    gabapentin (NEURONTIN) 1,200 mg, oral, 3 times daily    garlic 500 mg capsule oral    NON FORMULARY Beet root, olive leaf, garlic, l-arginnine, omega 3, moringa, nattokinase    NON  "FORMULARY Omega 3 tumeric curcumin    NON FORMULARY Pre/pro biotic    rOPINIRole (REQUIP) 1 mg, oral, Nightly        ED Triage Vitals [11/27/23 1421]   Temp Heart Rate Resp BP   36.4 °C (97.6 °F) 104 22 (!) 149/104      SpO2 Temp Source Heart Rate Source Patient Position   96 % Temporal -- --      BP Location FiO2 (%)     -- --         Heart Rate:  [104]   Temp:  [36.4 °C (97.6 °F)]   Resp:  [22]   BP: (149)/(104)   Height:  [180.3 cm (5' 11\")]   Weight:  [99.8 kg (220 lb)]   SpO2:  [96 %]      Physical Exam:  Physical Exam  Constitutional:       General: She is not in acute distress.     Appearance: Normal appearance. She is not ill-appearing.   HENT:      Head: Normocephalic and atraumatic.      Right Ear: External ear normal.      Left Ear: External ear normal.      Nose: Nose normal.      Mouth/Throat:      Mouth: Mucous membranes are moist.   Eyes:      Extraocular Movements: Extraocular movements intact.      Conjunctiva/sclera: Conjunctivae normal.      Pupils: Pupils are equal, round, and reactive to light.   Cardiovascular:      Rate and Rhythm: Normal rate and regular rhythm.      Pulses: Normal pulses.   Pulmonary:      Effort: Pulmonary effort is normal. No respiratory distress.      Breath sounds: Normal breath sounds.   Abdominal:      General: Abdomen is flat.      Palpations: Abdomen is soft.      Tenderness: There is no abdominal tenderness.   Musculoskeletal:      Cervical back: Normal range of motion and neck supple.      Comments: No bony midline thoracic or lumbar spinal tenderness to palpation.  Well-healed midline lumbar scar present.  Initially patient is point tender and just left of the midline of approximately L2 but this was not reproducible for attending.  Patient states she has an area of numbness surrounding the spot.  Reports tingling down the left leg with positive straight leg raise.  Negative straight leg raise on the right.  Sensation is intact down the left leg.  Patient is able " to ambulate without difficulty.   Skin:     General: Skin is warm and dry.      Capillary Refill: Capillary refill takes less than 2 seconds.   Neurological:      General: No focal deficit present.      Mental Status: She is alert and oriented to person, place, and time.   Psychiatric:         Mood and Affect: Mood normal.          Diagnostic Results        Labs Reviewed - No data to display      MR thoracic spine wo IV contrast   Final Result   1. Mild degenerative changes of the lower thoracic spine consisting   of mild diffuse disc bulging at T9-T10, T10-T11, T11-T12 minimal   indentation of the ventral thecal sac and no significant canal   stenosis. There is moderate left foraminal stenosis at T7-T8 and   T8-T9.        2. No focal cord lesions, suspicious osseous lesions, or acute   fracture or traumatic malalignment.        3. Incidental 1.2 cm sebaceous cyst/epidermal inclusion cyst in the   subcutaneous fat at the T4-T5 level        MACRO:   None.        Signed by: Jose Marie 11/27/2023 8:32 PM   Dictation workstation:   [id number]      MR lumbar spine wo IV contrast    (Results Pending)                 No data recorded                Procedure  Procedures    ED Course & MDM   Assessment/Plan:     Medications   lidocaine 4 % patch 1 patch (1 patch transdermal Not Given 11/27/23 1645)   HYDROmorphone (Dilaudid) injection 0.4 mg (has no administration in time range)   ketorolac (Toradol) injection 15 mg (15 mg intramuscular Given 11/27/23 1757)   orphenadrine (Norflex) injection 30 mg (30 mg intramuscular Given 11/27/23 1757)             Medical Decision Making    Rebecca Saenz is a 52 y.o. female with a history of lumbar surgeries presents after mechanical fall yesterday.  The patient is nontoxic-appearing and her vital signs are mostly normal other than mild tachycardia at a rate of 104.  Based on her presentation, differential diagnosis includes cauda equina syndrome, left lumbar radiculopathy,  muscle strain.  Upon chart review, the patient did go to [facility] today.  They obtained x-rays of her lumbar spine which have not yet been read.  Will obtain MRIs of thoracic and lumbar spine without contrast and give the patient 15 mg IM Toradol and 30 mg IM Norflex with 4% lidocaine patch.  [facility] was reviewed and the patient has had multiple prescriptions for narcotics and gabapentin over the past few months.  Neurosurgery was also called and requested call back if MRIs were positive.  Patient's pain did slightly improve with pain medication, however then she asked for additional pain medication.  Dilaudid was ordered for her, however she was in the fast-track area and could not get this medication until she goes to the back. MRI of the thoracic spine was performed which showed mild degenerative changes but no acute focal cord lesions.  There is an incidental 1.2 cm sebaceous cyst/epidermal inclusion cyst in the subcutaneous fat at the T4-T5 level.  Lumbar spine MRI was unable to be obtained at this time given patient's previous hardware.  She will need MRI suppression.  Given that this cannot be completed until tomorrow, recommend admission for pain control and MRI.  Patient pending acceptance by hospitalist.    Disposition: Admit    ED Prescriptions    None         Social Determinants Affecting Care: none      Nuha Chopra PA-C    This note was dictated by speech recognition. Minor errors in transcription may be present.     Nuha Chopra PA-C  11/27/23 3782

## 2023-11-27 NOTE — PROGRESS NOTES
NPV-   History of Present Illness  52 y.o.female presents at same day walk in clinic for low back and hip pain  1. Lumbar pain with radiation down left leg  XR lumbar spine complete 4+ views        Mechanism of injury: fall down ladder onto leg   Date of Injury/Pain: 2 days ago  Location of pain: lower back radiating to leg  Quality of pain: 10  Frequency of Pain: worse with movement  Associated symptoms? Swelling.  Previous treatment? Ibuprofen, hx of lumbar fusion  She is having new bladder incontinence     27 point review of systems negative except what is stated in HPI    On evaluation of back;  revealing no midline tenderness. Lower extremity myotome assessment intact. DTR L4, S1-2/4 bilaterally. Gross sensation intact to bilateral lower extremities. DP pulse 2+ bilaterally.  flexion 0-30, extension 0-20 with discomfort, L/R rotation 0-30, L/R sidebending 0-30 normal.   Positive Straight leg raise.     I personally reviewed the patient's x-ray images and reports of the lumbar spine. The xrays show that hardware is intact and in appropriate alignment.  Normal joint spacing    ASSESSMENT: right lumbar radiculopathy; concern for cauda equina syndrome    PLAN: I discussed with the patient the differential diagnosis, complex overuse and degenerative related nature of the condition(s) and available treatment option(s). Over concern for cauda equina syndrome, patient was sent to the ER for further evaluation.  All the patient's questions were answered. The patient agrees with the above plan.  Follow up in ER and with spine

## 2023-11-27 NOTE — ED TRIAGE NOTES
Pt was painting her house and she slid off the ladder bringing her weight down on her L-leg. Pt states that she did not fall on her back, no head injury, no LOC. Pt states that she is having lower back pain that radiates down her LLE. Pt states that when she landed she urinated a little bit and that was alarming for her.

## 2023-11-28 ENCOUNTER — APPOINTMENT (OUTPATIENT)
Dept: RADIOLOGY | Facility: HOSPITAL | Age: 53
DRG: 690 | End: 2023-11-28
Payer: COMMERCIAL

## 2023-11-28 VITALS
BODY MASS INDEX: 30.8 KG/M2 | HEART RATE: 86 BPM | SYSTOLIC BLOOD PRESSURE: 147 MMHG | DIASTOLIC BLOOD PRESSURE: 88 MMHG | OXYGEN SATURATION: 95 % | HEIGHT: 71 IN | RESPIRATION RATE: 18 BRPM | TEMPERATURE: 98.8 F | WEIGHT: 220 LBS
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
## 2023-11-28 PROBLEM — M51.26 LUMBAR DISC HERNIATION: Status: ACTIVE | Noted: 2023-11-28

## 2023-11-28 PROBLEM — R32 URINARY INCONTINENCE, UNSPECIFIED TYPE: Status: ACTIVE | Noted: 2023-11-28

## 2023-11-28 LAB
ALBUMIN SERPL BCP-MCNC: 4.3 G/DL (ref 3.4–5)
ALP SERPL-CCNC: 65 U/L (ref 33–110)
ALT SERPL W P-5'-P-CCNC: 19 U/L (ref 7–45)
ANION GAP SERPL CALC-SCNC: 12 MMOL/L (ref 10–20)
APPEARANCE UR: ABNORMAL
APPEARANCE UR: ABNORMAL
AST SERPL W P-5'-P-CCNC: 35 U/L (ref 9–39)
BACTERIA #/AREA URNS AUTO: ABNORMAL /HPF
BILIRUB SERPL-MCNC: 0.6 MG/DL (ref 0–1.2)
BILIRUB UR STRIP.AUTO-MCNC: NEGATIVE MG/DL
BILIRUB UR STRIP.AUTO-MCNC: NEGATIVE MG/DL
BUN SERPL-MCNC: 16 MG/DL (ref 6–23)
CALCIUM SERPL-MCNC: 9.2 MG/DL (ref 8.6–10.3)
CHLORIDE SERPL-SCNC: 103 MMOL/L (ref 98–107)
CO2 SERPL-SCNC: 27 MMOL/L (ref 21–32)
COLOR UR: ABNORMAL
COLOR UR: YELLOW
CREAT SERPL-MCNC: 1.05 MG/DL (ref 0.5–1.05)
GFR SERPL CREATININE-BSD FRML MDRD: 63 ML/MIN/1.73M*2
GLUCOSE SERPL-MCNC: 94 MG/DL (ref 74–99)
GLUCOSE UR STRIP.AUTO-MCNC: NEGATIVE MG/DL
GLUCOSE UR STRIP.AUTO-MCNC: NEGATIVE MG/DL
HYALINE CASTS #/AREA URNS AUTO: ABNORMAL /LPF
HYALINE CASTS #/AREA URNS AUTO: ABNORMAL /LPF
KETONES UR STRIP.AUTO-MCNC: ABNORMAL MG/DL
KETONES UR STRIP.AUTO-MCNC: NEGATIVE MG/DL
LEUKOCYTE ESTERASE UR QL STRIP.AUTO: ABNORMAL
LEUKOCYTE ESTERASE UR QL STRIP.AUTO: ABNORMAL
MUCOUS THREADS #/AREA URNS AUTO: ABNORMAL /LPF
MUCOUS THREADS #/AREA URNS AUTO: ABNORMAL /LPF
NITRITE UR QL STRIP.AUTO: NEGATIVE
NITRITE UR QL STRIP.AUTO: NEGATIVE
PH UR STRIP.AUTO: 5 [PH]
PH UR STRIP.AUTO: 5 [PH]
POTASSIUM SERPL-SCNC: 3.7 MMOL/L (ref 3.5–5.3)
PROT SERPL-MCNC: 7.1 G/DL (ref 6.4–8.2)
PROT UR STRIP.AUTO-MCNC: ABNORMAL MG/DL
PROT UR STRIP.AUTO-MCNC: ABNORMAL MG/DL
RBC # UR STRIP.AUTO: NEGATIVE /UL
RBC # UR STRIP.AUTO: NEGATIVE /UL
RBC #/AREA URNS AUTO: ABNORMAL /HPF
RBC #/AREA URNS AUTO: ABNORMAL /HPF
SODIUM SERPL-SCNC: 138 MMOL/L (ref 136–145)
SP GR UR STRIP.AUTO: 1.02
SP GR UR STRIP.AUTO: 1.03
SQUAMOUS #/AREA URNS AUTO: ABNORMAL /HPF
SQUAMOUS #/AREA URNS AUTO: ABNORMAL /HPF
UROBILINOGEN UR STRIP.AUTO-MCNC: 2 MG/DL
UROBILINOGEN UR STRIP.AUTO-MCNC: 4 MG/DL
WBC #/AREA URNS AUTO: ABNORMAL /HPF
WBC #/AREA URNS AUTO: ABNORMAL /HPF

## 2023-11-28 PROCEDURE — 81001 URINALYSIS AUTO W/SCOPE: CPT | Performed by: HOSPITALIST

## 2023-11-28 PROCEDURE — 72148 MRI LUMBAR SPINE W/O DYE: CPT

## 2023-11-28 PROCEDURE — 2500000001 HC RX 250 WO HCPCS SELF ADMINISTERED DRUGS (ALT 637 FOR MEDICARE OP): Performed by: HOSPITALIST

## 2023-11-28 PROCEDURE — 1210000001 HC SEMI-PRIVATE ROOM DAILY

## 2023-11-28 PROCEDURE — 2500000004 HC RX 250 GENERAL PHARMACY W/ HCPCS (ALT 636 FOR OP/ED): Performed by: HOSPITALIST

## 2023-11-28 PROCEDURE — 72148 MRI LUMBAR SPINE W/O DYE: CPT | Performed by: RADIOLOGY

## 2023-11-28 PROCEDURE — 73502 X-RAY EXAM HIP UNI 2-3 VIEWS: CPT | Mod: LT

## 2023-11-28 PROCEDURE — 99223 1ST HOSP IP/OBS HIGH 75: CPT | Performed by: HOSPITALIST

## 2023-11-28 PROCEDURE — G0378 HOSPITAL OBSERVATION PER HR: HCPCS

## 2023-11-28 PROCEDURE — 99239 HOSP IP/OBS DSCHRG MGMT >30: CPT | Performed by: INTERNAL MEDICINE

## 2023-11-28 PROCEDURE — 73502 X-RAY EXAM HIP UNI 2-3 VIEWS: CPT | Mod: LEFT SIDE | Performed by: STUDENT IN AN ORGANIZED HEALTH CARE EDUCATION/TRAINING PROGRAM

## 2023-11-28 RX ORDER — OXYCODONE HYDROCHLORIDE 10 MG/1
10 TABLET ORAL EVERY 6 HOURS PRN
Qty: 20 TABLET | Refills: 0 | Status: SHIPPED | OUTPATIENT
Start: 2023-11-28 | End: 2023-11-30

## 2023-11-28 RX ORDER — OXYCODONE HYDROCHLORIDE 5 MG/1
5 TABLET ORAL EVERY 6 HOURS PRN
Status: DISCONTINUED | OUTPATIENT
Start: 2023-11-28 | End: 2023-11-28 | Stop reason: HOSPADM

## 2023-11-28 RX ORDER — GABAPENTIN 300 MG/1
1200 CAPSULE ORAL 3 TIMES DAILY
Status: DISCONTINUED | OUTPATIENT
Start: 2023-11-28 | End: 2023-11-28 | Stop reason: HOSPADM

## 2023-11-28 RX ORDER — CYCLOBENZAPRINE HCL 10 MG
10 TABLET ORAL 3 TIMES DAILY PRN
Qty: 60 TABLET | Refills: 0 | Status: SHIPPED | OUTPATIENT
Start: 2023-11-28 | End: 2023-12-11 | Stop reason: SDUPTHER

## 2023-11-28 RX ORDER — ONDANSETRON 4 MG/1
4 TABLET, ORALLY DISINTEGRATING ORAL EVERY 8 HOURS PRN
Status: DISCONTINUED | OUTPATIENT
Start: 2023-11-28 | End: 2023-11-28 | Stop reason: HOSPADM

## 2023-11-28 RX ORDER — ACETAMINOPHEN 325 MG/1
650 TABLET ORAL EVERY 4 HOURS PRN
Qty: 30 TABLET | Refills: 0 | Status: SHIPPED | OUTPATIENT
Start: 2023-11-28 | End: 2023-12-11 | Stop reason: ALTCHOICE

## 2023-11-28 RX ORDER — CHOLECALCIFEROL (VITAMIN D3) 25 MCG
1000 TABLET ORAL DAILY
Status: DISCONTINUED | OUTPATIENT
Start: 2023-11-28 | End: 2023-11-28 | Stop reason: HOSPADM

## 2023-11-28 RX ORDER — OXYCODONE HYDROCHLORIDE 5 MG/1
10 TABLET ORAL EVERY 6 HOURS PRN
Status: DISCONTINUED | OUTPATIENT
Start: 2023-11-28 | End: 2023-11-28 | Stop reason: HOSPADM

## 2023-11-28 RX ORDER — SODIUM CHLORIDE 9 MG/ML
100 INJECTION, SOLUTION INTRAVENOUS CONTINUOUS
Status: DISCONTINUED | OUTPATIENT
Start: 2023-11-28 | End: 2023-11-28 | Stop reason: HOSPADM

## 2023-11-28 RX ORDER — ONDANSETRON HYDROCHLORIDE 2 MG/ML
4 INJECTION, SOLUTION INTRAVENOUS EVERY 8 HOURS PRN
Status: DISCONTINUED | OUTPATIENT
Start: 2023-11-28 | End: 2023-11-28 | Stop reason: HOSPADM

## 2023-11-28 RX ORDER — ENOXAPARIN SODIUM 100 MG/ML
40 INJECTION SUBCUTANEOUS EVERY 24 HOURS
Status: DISCONTINUED | OUTPATIENT
Start: 2023-11-28 | End: 2023-11-28 | Stop reason: HOSPADM

## 2023-11-28 RX ORDER — CEPHALEXIN 500 MG/1
500 CAPSULE ORAL 3 TIMES DAILY
Qty: 21 CAPSULE | Refills: 0 | Status: SHIPPED | OUTPATIENT
Start: 2023-11-28 | End: 2023-12-11 | Stop reason: ALTCHOICE

## 2023-11-28 RX ORDER — HYDROXYZINE PAMOATE 25 MG/1
25 CAPSULE ORAL 3 TIMES DAILY PRN
Qty: 30 CAPSULE | Refills: 0 | Status: SHIPPED | OUTPATIENT
Start: 2023-11-28 | End: 2023-12-11 | Stop reason: ALTCHOICE

## 2023-11-28 RX ORDER — DIPHENHYDRAMINE HCL 25 MG
25 CAPSULE ORAL EVERY 6 HOURS PRN
Status: DISCONTINUED | OUTPATIENT
Start: 2023-11-28 | End: 2023-11-28 | Stop reason: HOSPADM

## 2023-11-28 RX ORDER — POLYETHYLENE GLYCOL 3350 17 G/17G
17 POWDER, FOR SOLUTION ORAL DAILY
Status: DISCONTINUED | OUTPATIENT
Start: 2023-11-28 | End: 2023-11-28 | Stop reason: HOSPADM

## 2023-11-28 RX ORDER — POLYETHYLENE GLYCOL 3350 17 G/17G
17 POWDER, FOR SOLUTION ORAL DAILY
Start: 2023-11-29 | End: 2023-12-11 | Stop reason: ALTCHOICE

## 2023-11-28 RX ORDER — ACETAMINOPHEN 325 MG/1
650 TABLET ORAL EVERY 4 HOURS PRN
Status: DISCONTINUED | OUTPATIENT
Start: 2023-11-28 | End: 2023-11-28 | Stop reason: HOSPADM

## 2023-11-28 RX ORDER — CEFTRIAXONE 1 G/50ML
1 INJECTION, SOLUTION INTRAVENOUS EVERY 24 HOURS
Status: DISCONTINUED | OUTPATIENT
Start: 2023-11-28 | End: 2023-11-28 | Stop reason: HOSPADM

## 2023-11-28 RX ORDER — TALC
3 POWDER (GRAM) TOPICAL DAILY
Status: DISCONTINUED | OUTPATIENT
Start: 2023-11-28 | End: 2023-11-28 | Stop reason: HOSPADM

## 2023-11-28 RX ORDER — ROPINIROLE 1 MG/1
1 TABLET, FILM COATED ORAL NIGHTLY
Status: DISCONTINUED | OUTPATIENT
Start: 2023-11-28 | End: 2023-11-28 | Stop reason: HOSPADM

## 2023-11-28 RX ADMIN — DIPHENHYDRAMINE HYDROCHLORIDE 25 MG: 25 CAPSULE ORAL at 05:23

## 2023-11-28 RX ADMIN — GABAPENTIN 1200 MG: 300 CAPSULE ORAL at 16:33

## 2023-11-28 RX ADMIN — Medication 3 MG: at 03:20

## 2023-11-28 RX ADMIN — DIPHENHYDRAMINE HYDROCHLORIDE 25 MG: 25 CAPSULE ORAL at 09:24

## 2023-11-28 RX ADMIN — OXYCODONE HYDROCHLORIDE 10 MG: 5 TABLET ORAL at 03:20

## 2023-11-28 RX ADMIN — Medication 1000 UNITS: at 09:20

## 2023-11-28 RX ADMIN — OXYCODONE HYDROCHLORIDE 10 MG: 5 TABLET ORAL at 09:16

## 2023-11-28 RX ADMIN — OXYCODONE HYDROCHLORIDE 10 MG: 5 TABLET ORAL at 16:33

## 2023-11-28 RX ADMIN — POLYETHYLENE GLYCOL 3350 17 G: 17 POWDER, FOR SOLUTION ORAL at 09:19

## 2023-11-28 RX ADMIN — SODIUM CHLORIDE 100 ML/HR: 9 INJECTION, SOLUTION INTRAVENOUS at 03:20

## 2023-11-28 RX ADMIN — CEFTRIAXONE SODIUM 1 G: 1 INJECTION, SOLUTION INTRAVENOUS at 04:37

## 2023-11-28 RX ADMIN — DIPHENHYDRAMINE HYDROCHLORIDE 25 MG: 25 CAPSULE ORAL at 16:33

## 2023-11-28 RX ADMIN — ENOXAPARIN SODIUM 40 MG: 40 INJECTION SUBCUTANEOUS at 09:19

## 2023-11-28 RX ADMIN — GABAPENTIN 1200 MG: 300 CAPSULE ORAL at 09:20

## 2023-11-28 SDOH — SOCIAL STABILITY: SOCIAL INSECURITY
WITHIN THE LAST YEAR, HAVE TO BEEN RAPED OR FORCED TO HAVE ANY KIND OF SEXUAL ACTIVITY BY YOUR PARTNER OR EX-PARTNER?: NO

## 2023-11-28 SDOH — SOCIAL STABILITY: SOCIAL INSECURITY: DO YOU FEEL ANYONE HAS EXPLOITED OR TAKEN ADVANTAGE OF YOU FINANCIALLY OR OF YOUR PERSONAL PROPERTY?: NO

## 2023-11-28 SDOH — SOCIAL STABILITY: SOCIAL NETWORK: HOW OFTEN DO YOU ATTEND CHURCH OR RELIGIOUS SERVICES?: NEVER

## 2023-11-28 SDOH — HEALTH STABILITY: MENTAL HEALTH: HOW OFTEN DO YOU HAVE 6 OR MORE DRINKS ON ONE OCCASION?: NEVER

## 2023-11-28 SDOH — ECONOMIC STABILITY: INCOME INSECURITY: IN THE PAST 12 MONTHS, HAS THE ELECTRIC, GAS, OIL, OR WATER COMPANY THREATENED TO SHUT OFF SERVICE IN YOUR HOME?: NO

## 2023-11-28 SDOH — HEALTH STABILITY: MENTAL HEALTH
STRESS IS WHEN SOMEONE FEELS TENSE, NERVOUS, ANXIOUS, OR CAN'T SLEEP AT NIGHT BECAUSE THEIR MIND IS TROUBLED. HOW STRESSED ARE YOU?: TO SOME EXTENT

## 2023-11-28 SDOH — SOCIAL STABILITY: SOCIAL NETWORK: HOW OFTEN DO YOU ATTENT MEETINGS OF THE CLUB OR ORGANIZATION YOU BELONG TO?: NEVER

## 2023-11-28 SDOH — ECONOMIC STABILITY: FOOD INSECURITY: WITHIN THE PAST 12 MONTHS, THE FOOD YOU BOUGHT JUST DIDN'T LAST AND YOU DIDN'T HAVE MONEY TO GET MORE.: NEVER TRUE

## 2023-11-28 SDOH — SOCIAL STABILITY: SOCIAL INSECURITY: WITHIN THE LAST YEAR, HAVE YOU BEEN HUMILIATED OR EMOTIONALLY ABUSED IN OTHER WAYS BY YOUR PARTNER OR EX-PARTNER?: NO

## 2023-11-28 SDOH — SOCIAL STABILITY: SOCIAL INSECURITY: ARE THERE ANY APPARENT SIGNS OF INJURIES/BEHAVIORS THAT COULD BE RELATED TO ABUSE/NEGLECT?: NO

## 2023-11-28 SDOH — SOCIAL STABILITY: SOCIAL NETWORK: ARE YOU MARRIED, WIDOWED, DIVORCED, SEPARATED, NEVER MARRIED, OR LIVING WITH A PARTNER?: DIVORCED

## 2023-11-28 SDOH — ECONOMIC STABILITY: INCOME INSECURITY: IN THE LAST 12 MONTHS, WAS THERE A TIME WHEN YOU WERE NOT ABLE TO PAY THE MORTGAGE OR RENT ON TIME?: NO

## 2023-11-28 SDOH — SOCIAL STABILITY: SOCIAL NETWORK
DO YOU BELONG TO ANY CLUBS OR ORGANIZATIONS SUCH AS CHURCH GROUPS UNIONS, FRATERNAL OR ATHLETIC GROUPS, OR SCHOOL GROUPS?: NO

## 2023-11-28 SDOH — HEALTH STABILITY: MENTAL HEALTH: HOW OFTEN DO YOU HAVE A DRINK CONTAINING ALCOHOL?: NEVER

## 2023-11-28 SDOH — SOCIAL STABILITY: SOCIAL INSECURITY: WITHIN THE LAST YEAR, HAVE YOU BEEN AFRAID OF YOUR PARTNER OR EX-PARTNER?: NO

## 2023-11-28 SDOH — ECONOMIC STABILITY: FOOD INSECURITY: WITHIN THE PAST 12 MONTHS, YOU WORRIED THAT YOUR FOOD WOULD RUN OUT BEFORE YOU GOT MONEY TO BUY MORE.: NEVER TRUE

## 2023-11-28 SDOH — SOCIAL STABILITY: SOCIAL INSECURITY
WITHIN THE LAST YEAR, HAVE YOU BEEN KICKED, HIT, SLAPPED, OR OTHERWISE PHYSICALLY HURT BY YOUR PARTNER OR EX-PARTNER?: NO

## 2023-11-28 SDOH — ECONOMIC STABILITY: HOUSING INSECURITY
IN THE LAST 12 MONTHS, WAS THERE A TIME WHEN YOU DID NOT HAVE A STEADY PLACE TO SLEEP OR SLEPT IN A SHELTER (INCLUDING NOW)?: NO

## 2023-11-28 SDOH — SOCIAL STABILITY: SOCIAL INSECURITY: ARE YOU OR HAVE YOU BEEN THREATENED OR ABUSED PHYSICALLY, EMOTIONALLY, OR SEXUALLY BY ANYONE?: NO

## 2023-11-28 SDOH — ECONOMIC STABILITY: INCOME INSECURITY: HOW HARD IS IT FOR YOU TO PAY FOR THE VERY BASICS LIKE FOOD, HOUSING, MEDICAL CARE, AND HEATING?: NOT HARD AT ALL

## 2023-11-28 SDOH — SOCIAL STABILITY: SOCIAL INSECURITY: DO YOU FEEL UNSAFE GOING BACK TO THE PLACE WHERE YOU ARE LIVING?: NO

## 2023-11-28 SDOH — SOCIAL STABILITY: SOCIAL INSECURITY: WERE YOU ABLE TO COMPLETE ALL THE BEHAVIORAL HEALTH SCREENINGS?: YES

## 2023-11-28 SDOH — SOCIAL STABILITY: SOCIAL INSECURITY: ABUSE: ADULT

## 2023-11-28 SDOH — HEALTH STABILITY: PHYSICAL HEALTH: ON AVERAGE, HOW MANY MINUTES DO YOU ENGAGE IN EXERCISE AT THIS LEVEL?: 0 MIN

## 2023-11-28 SDOH — HEALTH STABILITY: MENTAL HEALTH: HOW MANY STANDARD DRINKS CONTAINING ALCOHOL DO YOU HAVE ON A TYPICAL DAY?: PATIENT DOES NOT DRINK

## 2023-11-28 SDOH — SOCIAL STABILITY: SOCIAL NETWORK: HOW OFTEN DO YOU GET TOGETHER WITH FRIENDS OR RELATIVES?: NEVER

## 2023-11-28 SDOH — SOCIAL STABILITY: SOCIAL INSECURITY: DOES ANYONE TRY TO KEEP YOU FROM HAVING/CONTACTING OTHER FRIENDS OR DOING THINGS OUTSIDE YOUR HOME?: NO

## 2023-11-28 SDOH — ECONOMIC STABILITY: HOUSING INSECURITY: IN THE LAST 12 MONTHS, HOW MANY PLACES HAVE YOU LIVED?: 1

## 2023-11-28 SDOH — ECONOMIC STABILITY: TRANSPORTATION INSECURITY
IN THE PAST 12 MONTHS, HAS THE LACK OF TRANSPORTATION KEPT YOU FROM MEDICAL APPOINTMENTS OR FROM GETTING MEDICATIONS?: NO

## 2023-11-28 SDOH — SOCIAL STABILITY: SOCIAL INSECURITY: HAS ANYONE EVER THREATENED TO HURT YOUR FAMILY OR YOUR PETS?: NO

## 2023-11-28 SDOH — SOCIAL STABILITY: SOCIAL NETWORK
IN A TYPICAL WEEK, HOW MANY TIMES DO YOU TALK ON THE PHONE WITH FAMILY, FRIENDS, OR NEIGHBORS?: MORE THAN THREE TIMES A WEEK

## 2023-11-28 SDOH — ECONOMIC STABILITY: TRANSPORTATION INSECURITY
IN THE PAST 12 MONTHS, HAS LACK OF TRANSPORTATION KEPT YOU FROM MEETINGS, WORK, OR FROM GETTING THINGS NEEDED FOR DAILY LIVING?: NO

## 2023-11-28 SDOH — HEALTH STABILITY: PHYSICAL HEALTH: ON AVERAGE, HOW MANY DAYS PER WEEK DO YOU ENGAGE IN MODERATE TO STRENUOUS EXERCISE (LIKE A BRISK WALK)?: 0 DAYS

## 2023-11-28 ASSESSMENT — PAIN SCALES - GENERAL
PAINLEVEL_OUTOF10: 8
PAINLEVEL_OUTOF10: 7
PAINLEVEL_OUTOF10: 6

## 2023-11-28 ASSESSMENT — ENCOUNTER SYMPTOMS
CONSTITUTIONAL NEGATIVE: 1
ALLERGIC/IMMUNOLOGIC NEGATIVE: 1
BACK PAIN: 1
CARDIOVASCULAR NEGATIVE: 1
RESPIRATORY NEGATIVE: 1
PSYCHIATRIC NEGATIVE: 1
HEMATOLOGIC/LYMPHATIC NEGATIVE: 1
EYES NEGATIVE: 1
GASTROINTESTINAL NEGATIVE: 1

## 2023-11-28 ASSESSMENT — COGNITIVE AND FUNCTIONAL STATUS - GENERAL
PATIENT BASELINE BEDBOUND: NO
MOBILITY SCORE: 24
DAILY ACTIVITIY SCORE: 24

## 2023-11-28 ASSESSMENT — ACTIVITIES OF DAILY LIVING (ADL)
JUDGMENT_ADEQUATE_SAFELY_COMPLETE_DAILY_ACTIVITIES: YES
GROOMING: INDEPENDENT
TOILETING: INDEPENDENT
ADEQUATE_TO_COMPLETE_ADL: YES
HEARING - LEFT EAR: FUNCTIONAL
DRESSING YOURSELF: INDEPENDENT
WALKS IN HOME: INDEPENDENT
HEARING - RIGHT EAR: FUNCTIONAL
FEEDING YOURSELF: INDEPENDENT
PATIENT'S MEMORY ADEQUATE TO SAFELY COMPLETE DAILY ACTIVITIES?: YES
BATHING: INDEPENDENT
LACK_OF_TRANSPORTATION: NO

## 2023-11-28 ASSESSMENT — LIFESTYLE VARIABLES
PRESCIPTION_ABUSE_PAST_12_MONTHS: NO
SKIP TO QUESTIONS 9-10: 1
SUBSTANCE_ABUSE_PAST_12_MONTHS: NO
AUDIT-C TOTAL SCORE: 0

## 2023-11-28 ASSESSMENT — PAIN - FUNCTIONAL ASSESSMENT
PAIN_FUNCTIONAL_ASSESSMENT: 0-10
PAIN_FUNCTIONAL_ASSESSMENT: 0-10

## 2023-11-28 ASSESSMENT — PAIN DESCRIPTION - ORIENTATION: ORIENTATION: LEFT

## 2023-11-28 ASSESSMENT — PAIN DESCRIPTION - LOCATION: LOCATION: BACK

## 2023-11-28 NOTE — PROGRESS NOTES
Transitional Care Coordination Progress Note:  Plan per Medical/Surgical team: treatment of urinary incontinence ever since fall from ladder, back pain & pelvic pain with IV ATB, IV fluids, Dilaudid, toradol, oxy, MRI lumbar pending  Status: inpatient  Payor source: [place]  Discharge disposition: Home alone  Potential Barriers: pain  ADOD: 12/1/2023  GASTON Snyder RN, BSN Transitional Care Coordinator ED# [phone]      11/28/23 1012   Discharge Planning   Living Arrangements Alone   Support Systems Children   Assistance Needed pain management   Type of Residence Private residence   Number of Stairs to Enter Residence 5   Number of Stairs Within Residence 12   Do you have animals or pets at home? Yes   Type of Animals or Pets dog   Home or Post Acute Services None   Patient expects to be discharged to: Home alone   Does the patient need discharge transport arranged? Yes   RoundTrip coordination needed? Yes   Has discharge transport been arranged? No   Financial Resource Strain   How hard is it for you to pay for the very basics like food, housing, medical care, and heating? Not hard   Housing Stability   In the last 12 months, was there a time when you were not able to pay the mortgage or rent on time? N   In the last 12 months, how many places have you lived? 1   In the last 12 months, was there a time when you did not have a steady place to sleep or slept in a shelter (including now)? N   Transportation Needs   In the past 12 months, has lack of transportation kept you from medical appointments or from getting medications? no   In the past 12 months, has lack of transportation kept you from meetings, work, or from getting things needed for daily living? No

## 2023-11-28 NOTE — PROGRESS NOTES
This patient should go to Dr. Mcintosh.  I communicated with her today, and she will take over care.

## 2023-11-28 NOTE — PROGRESS NOTES
11/28/23 1016   Physical Activity   On average, how many days per week do you engage in moderate to strenuous exercise (like a brisk walk)? 0 days   On average, how many minutes do you engage in exercise at this level? 0 min   Financial Resource Strain   How hard is it for you to pay for the very basics like food, housing, medical care, and heating? Not hard   Housing Stability   In the last 12 months, was there a time when you were not able to pay the mortgage or rent on time? N   In the last 12 months, how many places have you lived? 1   In the last 12 months, was there a time when you did not have a steady place to sleep or slept in a shelter (including now)? N   Transportation Needs   In the past 12 months, has lack of transportation kept you from medical appointments or from getting medications? no   In the past 12 months, has lack of transportation kept you from meetings, work, or from getting things needed for daily living? No   Food Insecurity   Within the past 12 months, you worried that your food would run out before you got the money to buy more. Never true   Within the past 12 months, the food you bought just didn't last and you didn't have money to get more. Never true   Stress   Do you feel stress - tense, restless, nervous, or anxious, or unable to sleep at night because your mind is troubled all the time - these days? To some exte   Social Connections   In a typical week, how many times do you talk on the phone with family, friends, or neighbors? More than 3   How often do you get together with friends or relatives? Never   How often do you attend Denominational or Temple services? Never   Do you belong to any clubs or organizations such as Denominational groups, unions, fraternal or athletic groups, or school groups? No   How often do you attend meetings of the clubs or organizations you belong to? Never   Are you , , , , never , or living with a partner?     Intimate Partner Violence   Within the last year, have you been afraid of your partner or ex-partner? No   Within the last year, have you been humiliated or emotionally abused in other ways by your partner or ex-partner? No   Within the last year, have you been kicked, hit, slapped, or otherwise physically hurt by your partner or ex-partner? No   Within the last year, have you been raped or forced to have any kind of sexual activity by your partner or ex-partner? No   Alcohol Use   Q1: How often do you have a drink containing alcohol? Never   Q2: How many drinks containing alcohol do you have on a typical day when you are drinking? None   Q3: How often do you have six or more drinks on one occasion? Never   Utilities   In the past 12 months has the electric, gas, oil, or water company threatened to shut off services in your home? No

## 2023-11-28 NOTE — PROGRESS NOTES
11/28/23 1011   [facility] Disability Status   Are you deaf or do you have serious difficulty hearing? N   Are you blind or do you have serious difficulty seeing, even when wearing glasses? N   Because of a physical, mental, or emotional condition, do you have serious difficulty concentrating, remembering, or making decisions? (5 years old or older) N   Do you have serious difficulty walking or climbing stairs? Y  (arthritis)   Do you have serious difficulty dressing or bathing? N   Because of a physical, mental, or emotional condition, do you have serious difficulty doing errands alone such as visiting the doctor? N

## 2023-11-28 NOTE — PROGRESS NOTES
"Rebecca Saenz is a 52 y.o. female on day 0 of admission presenting with Urinary incontinence.    Subjective   Patient is still in a lot of pain.  Able to walk around       Objective     Physical Exam  Vitals reviewed.   Constitutional:       Appearance: Normal appearance.   HENT:      Head: Normocephalic and atraumatic.      Right Ear: Tympanic membrane, ear canal and external ear normal.      Left Ear: Tympanic membrane, ear canal and external ear normal.      Nose: Nose normal.      Mouth/Throat:      Pharynx: Oropharynx is clear.   Eyes:      Extraocular Movements: Extraocular movements intact.      Conjunctiva/sclera: Conjunctivae normal.      Pupils: Pupils are equal, round, and reactive to light.   Cardiovascular:      Rate and Rhythm: Normal rate and regular rhythm.      Pulses: Normal pulses.      Heart sounds: Normal heart sounds.   Pulmonary:      Effort: Pulmonary effort is normal.      Breath sounds: Normal breath sounds.   Abdominal:      General: Abdomen is flat. Bowel sounds are normal.      Palpations: Abdomen is soft.   Musculoskeletal:         General: Tenderness present.      Cervical back: Normal range of motion and neck supple.      Comments: Lumbar area tenderness   Skin:     General: Skin is warm and dry.   Neurological:      General: No focal deficit present.      Mental Status: She is alert and oriented to person, place, and time.   Psychiatric:         Mood and Affect: Mood normal.         Last Recorded Vitals  Blood pressure 136/82, pulse 83, temperature 36.6 °C (97.9 °F), temperature source Temporal, resp. rate 20, height 1.803 m (5' 11\"), weight 99.8 kg (220 lb), SpO2 94 %.  Intake/Output last 3 Shifts:  I/O last 3 completed shifts:  In: 50 (0.5 mL/kg) [IV Piggyback:50]  Out: - (0 mL/kg)   Weight: 99.8 kg     Relevant Results              Scheduled medications  cefTRIAXone, 1 g, intravenous, q24h  cholecalciferol, 1,000 Units, oral, Daily  enoxaparin, 40 mg, subcutaneous, " q24h  gabapentin, 1,200 mg, oral, TID  lidocaine, 1 patch, transdermal, Daily  melatonin, 3 mg, oral, Daily  polyethylene glycol, 17 g, oral, Daily  rOPINIRole, 1 mg, oral, Nightly      Continuous medications  sodium chloride 0.9%, 100 mL/hr, Last Rate: 100 mL/hr (11/28/23 0320)      PRN medications  PRN medications: acetaminophen, acetaminophen, diphenhydrAMINE, ondansetron ODT **OR** ondansetron, oxyCODONE, oxyCODONE  Results for orders placed or performed during the hospital encounter of 11/27/23 (from the past 24 hour(s))   CBC and Auto Differential   Result Value Ref Range    WBC 8.6 4.4 - 11.3 x10*3/uL    nRBC 0.0 0.0 - 0.0 /100 WBCs    RBC 3.97 (L) 4.00 - 5.20 x10*6/uL    Hemoglobin 12.1 12.0 - 16.0 g/dL    Hematocrit 36.2 36.0 - 46.0 %    MCV 91 80 - 100 fL    MCH 30.5 26.0 - 34.0 pg    MCHC 33.4 32.0 - 36.0 g/dL    RDW 13.8 11.5 - 14.5 %    Platelets 339 150 - 450 x10*3/uL    Neutrophils % 40.6 40.0 - 80.0 %    Immature Granulocytes %, Automated 0.2 0.0 - 0.9 %    Lymphocytes % 49.1 13.0 - 44.0 %    Monocytes % 6.4 2.0 - 10.0 %    Eosinophils % 3.0 0.0 - 6.0 %    Basophils % 0.7 0.0 - 2.0 %    Neutrophils Absolute 3.47 1.20 - 7.70 x10*3/uL    Immature Granulocytes Absolute, Automated 0.02 0.00 - 0.70 x10*3/uL    Lymphocytes Absolute 4.21 1.20 - 4.80 x10*3/uL    Monocytes Absolute 0.55 0.10 - 1.00 x10*3/uL    Eosinophils Absolute 0.26 0.00 - 0.70 x10*3/uL    Basophils Absolute 0.06 0.00 - 0.10 x10*3/uL   Comprehensive metabolic panel   Result Value Ref Range    Glucose 94 74 - 99 mg/dL    Sodium 138 136 - 145 mmol/L    Potassium 3.7 3.5 - 5.3 mmol/L    Chloride 103 98 - 107 mmol/L    Bicarbonate 27 21 - 32 mmol/L    Anion Gap 12 10 - 20 mmol/L    Urea Nitrogen 16 6 - 23 mg/dL    Creatinine 1.05 0.50 - 1.05 mg/dL    eGFR 63 >60 mL/min/1.73m*2    Calcium 9.2 8.6 - 10.3 mg/dL    Albumin 4.3 3.4 - 5.0 g/dL    Alkaline Phosphatase 65 33 - 110 U/L    Total Protein 7.1 6.4 - 8.2 g/dL    AST 35 9 - 39 U/L     Bilirubin, Total 0.6 0.0 - 1.2 mg/dL    ALT 19 7 - 45 U/L   Urinalysis with Reflex Microscopic and Culture   Result Value Ref Range    Color, Urine Yellow Straw, Yellow    Appearance, Urine Hazy (N) Clear    Specific Gravity, Urine 1.024 1.005 - 1.035    pH, Urine 5.0 5.0, 5.5, 6.0, 6.5, 7.0, 7.5, 8.0    Protein, Urine 30 (1+) (N) NEGATIVE mg/dL    Glucose, Urine NEGATIVE NEGATIVE mg/dL    Blood, Urine NEGATIVE NEGATIVE    Ketones, Urine NEGATIVE NEGATIVE mg/dL    Bilirubin, Urine NEGATIVE NEGATIVE    Urobilinogen, Urine 2.0 (N) <2.0 mg/dL    Nitrite, Urine NEGATIVE NEGATIVE    Leukocyte Esterase, Urine MODERATE (2+) (A) NEGATIVE   Microscopic Only, Urine   Result Value Ref Range    WBC, Urine 11-20 (A) 1-5, NONE /HPF    RBC, Urine 1-2 NONE, 1-2, 3-5 /HPF    Squamous Epithelial Cells, Urine 1-9 (SPARSE) Reference range not established. /HPF    Bacteria, Urine 3+ (A) NONE SEEN /HPF    Mucus, Urine 3+ Reference range not established. /LPF    Hyaline Casts, Urine 3+ (A) NONE /LPF   Urinalysis with Reflex Microscopic   Result Value Ref Range    Color, Urine Ally (N) Straw, Yellow    Appearance, Urine Hazy (N) Clear    Specific Gravity, Urine 1.030 1.005 - 1.035    pH, Urine 5.0 5.0, 5.5, 6.0, 6.5, 7.0, 7.5, 8.0    Protein, Urine 30 (1+) (N) NEGATIVE mg/dL    Glucose, Urine NEGATIVE NEGATIVE mg/dL    Blood, Urine NEGATIVE NEGATIVE    Ketones, Urine 5 (TRACE) (A) NEGATIVE mg/dL    Bilirubin, Urine NEGATIVE NEGATIVE    Urobilinogen, Urine 4.0 (N) <2.0 mg/dL    Nitrite, Urine NEGATIVE NEGATIVE    Leukocyte Esterase, Urine SMALL (1+) (A) NEGATIVE   Microscopic Only, Urine   Result Value Ref Range    WBC, Urine 6-10 (A) 1-5, NONE /HPF    RBC, Urine 1-2 NONE, 1-2, 3-5 /HPF    Squamous Epithelial Cells, Urine 1-9 (SPARSE) Reference range not established. /HPF    Mucus, Urine 3+ Reference range not established. /LPF    Hyaline Casts, Urine 1+ (A) NONE /LPF     XR lumbar spine complete 4+ views    Result Date:  11/28/2023  Interpreted By:  Mckenzie Gaitan, STUDY: Lumbar Spine, 4 views.   INDICATION: Signs/Symptoms:pain in low back and left hip.   COMPARISON: 10/03/2018.   ACCESSION NUMBER(S): [id number]   ORDERING CLINICIAN: SUSAN LU   FINDINGS: Mild levoscoliosis centered in the upper lumbar region. Posterior spinal instrumented fusion changes are noted from L2-L5 with interbody fusion at L3-4 and L4-5. Anterior interbody fusion at L5-S1. Hardware is intact without perihardware fractures or lucencies. Multilevel laminectomies   No dynamic instability on flexion/extension views. Vertebral body heights are preserved. Posterior elements are intact.       1. Postsurgical changes from L2-L5 without hardware complication.   MACRO: None.   Signed by: Mckenzie Gaitan 11/28/2023 5:48 PM Dictation workstation:   [id number]    MR lumbar spine wo IV contrast    Result Date: 11/28/2023  Interpreted By:  Rohit Billy, STUDY: MR LUMBAR SPINE WO IV CONTRAST;  11/28/2023 12:52 pm   INDICATION: Signs/Symptoms:needs MRI suppression for metal.   COMPARISON: None.   ACCESSION NUMBER(S): [id number]   ORDERING CLINICIAN: ZAC BARRETT   TECHNIQUE: Multiplanar and multisequential MR images of the lumbar spine are performed.   FINDINGS: Postoperative changes are identified with multilevel posterior decompression and fusion of the lumbar spine. Bilateral spinal rods and pedicle screws are identified from L2 through L5. Disc space cages are identified from L3 through S1. There is partial bony contiguity across at L4-5. There is some ill-defined fluid and edema at the surgical sites at the midline. There is no organized fluid collection.   There is mild levo convexity of the lumbar spine. The lumbar vertebral body AP alignment is within normal limits.   There are mild discogenic degenerative changes at T11-12, L1-2, and L2-3 with disc desiccation disc space narrowing. There is degenerative endplate signal at multiple levels. The  lumbar vertebral body heights are maintained. There is no acute bone marrow edema.   The conus medullaris is of normal morphology. The tip of the conus descends to the level of the L2 superior endplate.   Axial images are performed through the lumbar disc spaces from the inferior endplate of T12 through S1.   The T12-L1 disc space level is incompletely visualized. There is no significant central canal or neural foraminal stenosis.   The L1-2 disc space level is partially obscured by postsurgical artifact. There is mild bilateral facet arthrosis. There is bulging disc asymmetric to the left with some mass effect upon the ventral thecal sac and mild central canal narrowing. There is broad-based disc protrusion on the left extending into the caudal left neural foramen with moderate narrowing of the left neural foramen. There is a small component of extruded disc which migrates superiorly in the neural foramen and contacts the transiting left L1 nerve root.   The L2-3 disc space level is partially obscured by postsurgical artifact. The patient is status post bilateral laminectomy. There is mild bilateral facet arthrosis. There is minimal bulging disc. There is disc encroachment with mild narrowing at the caudal neural foramen bilaterally. There is no significant central canal narrowing.   The L3-4 disc space level is partially obscured by postsurgical artifact. The patient is status post bilateral laminectomy and partial right facetectomy. There is mild bilateral facet arthrosis. There is mild ridging osteophyte with narrowing at the caudal right neural foramen. There is no significant central canal narrowing.   The L4-5 disc space level is partially obscured by postsurgical artifact. The patient is status post bilateral laminectomy. There is mild to moderate bilateral facet arthrosis with partial ankylosis bilaterally. There is some ridging osteophyte with mild narrowing at the caudal left neural foramen. There is  minimal disc encroachment on the caudal right neural foramen. There is no significant central canal or neural foraminal stenosis.   The L5-S1 disc space level is partially obscured by postsurgical artifact. The patient is status post bilateral laminectomy asymmetric to the left. There is mild to moderate bilateral facet arthrosis with partial ankylosis. There is some ridging osteophyte with narrowing at the caudal neural foramen bilaterally. There is no significant central canal stenosis. There is no mass effect upon the thecal sac or S1 nerve roots.       Multilevel posterior decompression and fusion of the lumbar spine from L2 through S1.   Mild multilevel discogenic degenerative changes at T11-12, L1-2, and L2-3. There is multilevel bulging disc with mild central canal narrowing at L1-2.   There is broad-based disc protrusion at L1-2 on the left extending into the caudal left neural foramen and moderately narrowing the neural foramen. There is a small component of extruded disc at the left neural foramen which migrates superiorly and contacts the transiting left L1 nerve root.   No acute osseous abnormality.   Signed by: Rohit Billy 11/28/2023 1:05 PM Dictation workstation:   [id number]    XR hip left 2 or 3 views    Result Date: 11/28/2023  Interpreted By:  Jose Marie, STUDY: XR HIP LEFT 2 OR 3 VIEWS; ;  11/28/2023 6:10 am   INDICATION: Signs/Symptoms:pain in pelvis and hip area s/p fall from ladder.   COMPARISON: None.   ACCESSION NUMBER(S): [id number]   ORDERING CLINICIAN: ABDIRIZAK BYRNES   FINDINGS: The pelvic ring is intact without acute fracture or widening of the pubic symphysis or sacroiliac joints. There is no acute fracture of the proximal right or left femur or hip dislocation.   There are partially visualized postsurgical changes of the lower lumbar spine consisting of posterior fusion at L2-L5 with interbody disc cages at L3-L4, L4-5. There is also anterior fusion at L5-S1 with  interbody disc age.       No acute osseous abnormality of the pelvis or proximal right or left femur.     MACRO: None.   Signed by: Jose Marie 11/28/2023 6:39 AM Dictation workstation:   [id number]    MR thoracic spine wo IV contrast    Result Date: 11/27/2023  Interpreted By:  Jose Marie, STUDY: MR THORACIC SPINE WO IV CONTRAST;  11/27/2023 7:56 pm   INDICATION: Signs/Symptoms:bladder incontinence, back pain, injury.   COMPARISON: CT abdomen/pelvis 08/21/2023   ACCESSION NUMBER(S): [id number]   ORDERING CLINICIAN: ZAC BARRETT   TECHNIQUE: Multiplanar, multisequence images of the thoracic spine were obtained without contrast.   FINDINGS: VISUALIZED CHEST/UPPER ABDOMEN: There is a 2.2 cm simple cyst in the upper pole of the left kidney.   PARASPINAL SOFT TISSUES: No acute abnormality. Within the subcutaneous fat of the midline back at T4-T5 level there is a 1.1 cm T2 hyperintense cyst with a tract extending to the skin surface representing a sebaceous cyst/epidermal inclusion cyst.   SPINAL CORD: The spinal cord has a normal caliber and signal intensity. No focal cord lesions.   BONE MARROW/VERTEBRAL BODIES: Overall bone marrow signal is within normal limits. There is a probable 0.6 cm hemangioma within the anterior superior T4 vertebral body. Vertebral body heights are maintained. No acute fracture.   ALIGNMENT: No spondylolisthesis.   SPINAL CANAL, INTERVERTEBRAL DISCS, AND NEURAL FORAMINA: Limited evaluation on multiplanar  images demonstrate multilevel broad-based disc protrusions at C2-C3 through C6-C7, resulting in mild canal stenosis at C5-C6.   There are mild diffuse disc bulges at T9-T10, T10-T11, and T11-T12 that minimally indents the ventral thecal sac. There is anterior endplate spurring at multiple thoracic levels in the mid to lower thoracic spine.   There is no significant spinal canal stenosis. There is moderate left T7-T8, T8-T9 neural foraminal stenosis.       1. Mild  degenerative changes of the lower thoracic spine consisting of mild diffuse disc bulging at T9-T10, T10-T11, T11-T12 minimal indentation of the ventral thecal sac and no significant canal stenosis. There is moderate left foraminal stenosis at T7-T8 and T8-T9.   2. No focal cord lesions, suspicious osseous lesions, or acute fracture or traumatic malalignment.   3. Incidental 1.2 cm sebaceous cyst/epidermal inclusion cyst in the subcutaneous fat at the T4-T5 level   MACRO: None.   Signed by: Jose Marie 11/27/2023 8:32 PM Dictation workstation:   [id number]                  Assessment/Plan   Principal Problem:    Urinary incontinence  Active Problems:    Degeneration of lumbar intervertebral disc    Muscle spasm    Urinary incontinence, unspecified type    Fall    Lumbar disc herniation    MRI results reviewed  Neurosurgery input noted no need for surgery  Will discharge patient on oral antibiotic and pain medication with plans to follow-up with her primary neurosurgeon  Discharge home  Narcotic prescription written  Follow-up with Dr. Greg Meyers I spent  minutes in the professional and overall care of this patient.      Dominique Mcintosh MD

## 2023-11-28 NOTE — PROGRESS NOTES
Home alone     11/28/23 1011   Current Planned Discharge Disposition   Current Planned Discharge Disposition Home

## 2023-11-28 NOTE — PROGRESS NOTES
Pharmacy Medication History Review    Rebecca Saenz is a 52 y.o. female admitted for Urinary incontinence. Pharmacy reviewed the patient's [id number] medications and allergies for accuracy.    The list below reflectives the updated PTA list. Please review each medication in order reconciliation for additional clarification and justification.  Prior to Admission Medications   Prescriptions Last Dose Informant Patient Reported?    DULoxetine (Cymbalta) 60 mg DR capsule 11/27/2023  No    Sig: Take 1 capsule (60 mg) by mouth 2 times a day. Do not crush or chew.   NON FORMULARY 11/27/2023  Yes    Sig: Beet root, olive leaf, garlic, l-arginnine, omega 3, moringa, nattokinase   NON FORMULARY 11/27/2023  Yes    Sig: Omega 3 tumeric curcumin   NON FORMULARY 11/27/2023  Yes    Sig: Pre/pro biotic   calcium carbonate (CALCIUM 500 ORAL) 11/27/2023  Yes    Sig: Take by mouth.   cholecalciferol (Vitamin D3) 25 MCG (1000 UT) tablet 11/27/2023  Yes    Sig: Take 1 tablet (25 mcg) by mouth once daily.   cyanocobalamin (Vitamin B-12) 1,000 mcg tablet 11/27/2023  Yes    Sig: Take 100 mcg by mouth once daily.   gabapentin (Neurontin) 600 mg tablet 11/27/2023  No    Sig: Take 2 tablets (1,200 mg) by mouth 3 times a day.   garlic 500 mg capsule 11/27/2023  Yes    Sig: Take by mouth.   rOPINIRole (Requip) 1 mg tablet 11/26/2023 at pm  No    Sig: Take 1 tablet (1 mg) by mouth once daily at bedtime.      Facility-Administered Medications: None         The list below reflectives the updated allergy list. Please review each documented allergy for additional clarification and justification.  Allergies  Reviewed by Thom Yoon RN on 11/27/2023        Severity Reactions Comments    Sulfamethoxazole Not Specified Itching             Below are additional concerns with the patient's PTA list.   Spoke to the patient.  Patient no longer takes Paroxetine, it was replaced with yannickalgaurav Herndon CPhT

## 2023-11-29 ENCOUNTER — TELEPHONE (OUTPATIENT)
Dept: PRIMARY CARE | Facility: CLINIC | Age: 53
End: 2023-11-29
Payer: COMMERCIAL

## 2023-11-29 LAB — BACTERIA UR CULT: NORMAL

## 2023-11-29 NOTE — DISCHARGE SUMMARY
Discharge Diagnosis  Urinary incontinence    Issues Requiring Follow-Up  Neurosurgery follow-up regarding disc herniation lumbar spine after fall    Test Results Pending At Discharge  Pending Labs       Order Current Status    Urine Culture In process            Hospital Course   Rebecca Saenz is a 52 y.o. female with a PMH of spine surgeries in the past presenting with pelvic and back pain.  On Sunday Ms Saenz was standing up on a ladder between stories [address] of her house, when she was startled by her dog, lost her footing, and slid down the ladder holding onto the outer frame of the ladder. She landed on both of her feet. After this she had pain in her lumbar spine as well as left pelvic pain.   When she slid down the ladder she lost control of her bladder and had an episode of incontinence. Now she leaks small amounts of urine randomly.  Treated for UTI  MRI shows disc herniation but no acute intervention needed  Treated with pain management narcotics.  With plans to follow-up with outpatient primary neurosurgeon  Pertinent Physical Exam At Time of Discharge  Physical Exam    Home Medications     Medication List      START taking these medications     acetaminophen 325 mg tablet; Commonly known as: Tylenol; Take 2 tablets   (650 mg) by mouth every 4 hours if needed for fever (temp greater than   38.0 C) (greater than or equal to 38 C).   cephalexin 500 mg capsule; Commonly known as: Keflex; Take 1 capsule   (500 mg) by mouth 3 times a day.   cyclobenzaprine 10 mg tablet; Commonly known as: Flexeril; Take 1 tablet   (10 mg) by mouth 3 times a day as needed for muscle spasms.   hydrOXYzine pamoate 25 mg capsule; Commonly known as: Vistaril; Take 1   capsule (25 mg) by mouth 3 times a day as needed for itching.   oxyCODONE 10 mg immediate release tablet; Commonly known as: Roxicodone;   Take 1 tablet (10 mg) by mouth every 6 hours if needed for moderate pain   (4 - 6) for up to 5 days.   polyethylene glycol  17 gram packet; Commonly known as: Glycolax,   Miralax; Take 17 g by mouth once daily. Do not start before November 29, 2023.; Start taking on: November 29, 2023     CONTINUE taking these medications     CALCIUM 500 ORAL   cyanocobalamin 1,000 mcg tablet; Commonly known as: Vitamin B-12   DULoxetine 60 mg DR capsule; Commonly known as: Cymbalta; Take 1 capsule   (60 mg) by mouth 2 times a day. Do not crush or chew.   gabapentin 600 mg tablet; Commonly known as: Neurontin; Take 2 tablets   (1,200 mg) by mouth 3 times a day.   garlic 500 mg capsule   NON FORMULARY   NON FORMULARY   NON FORMULARY   rOPINIRole 1 mg tablet; Commonly known as: Requip; Take 1 tablet (1 mg)   by mouth once daily at bedtime.   Vitamin D3 25 MCG (1000 UT) tablet; Generic drug: cholecalciferol       Outpatient Follow-Up  No future appointments.    Dominique Mcintosh MD

## 2023-11-29 NOTE — CARE PLAN
Problem: Pain  Goal: Takes deep breaths with improved pain control throughout the shift  Outcome: Met  Goal: Turns in bed with improved pain control throughout the shift  Outcome: Met  Goal: Walks with improved pain control throughout the shift  Outcome: Met  Goal: Performs ADL's with improved pain control throughout shift  Outcome: Met  Goal: Participates in PT with improved pain control throughout the shift  Outcome: Met  Goal: Free from opioid side effects throughout the shift  Outcome: Met  Goal: Free from acute confusion related to pain meds throughout the shift  Outcome: Met     Problem: Pain - Adult  Goal: Verbalizes/displays adequate comfort level or baseline comfort level  Outcome: Met     Problem: Safety - Adult  Goal: Free from fall injury  Outcome: Met     Problem: Discharge Planning  Goal: Discharge to home or other facility with appropriate resources  Outcome: Met     Problem: Chronic Conditions and Co-morbidities  Goal: Patient's chronic conditions and co-morbidity symptoms are monitored and maintained or improved  Outcome: Met   The patient's goals for the shift include to be D/C     The clinical goals for the shift include no falls    Over the shift, the patient did make progress toward the following goals.  GOAL MET

## 2023-11-29 NOTE — NURSING NOTE
Pt discharged to home. Papers given and pt refused transport. She ambulated out to ED doors. IV is out and education compelte.

## 2023-11-29 NOTE — TELEPHONE ENCOUNTER
Transition of Care    Inpatient facility: [facility]  Discharge diagnosis: Urinary Incontinence/ S/P Fall  Discharged to: Home  Discharge date: 11/28/23  Initial Call date: 11/29/23  Spoke with patient/caregiver: Patient                                                                     Do you need assistance  visits prior to your PCP visit: No  Home health care ordered: No  Have you been contacted by home care and have a start of care date: No  Are you taking medications as prescribed at discharge: Yes    Referral to [facility] Pharmacist: No  Patient advised to bring all medications to PCP follow-up appointment.  Patient advised to follow discharge instructions until provider follow-up.  TCM visit date: 11/30/23  TCM provider visit with: Greg Meyers MD

## 2023-11-30 ENCOUNTER — OFFICE VISIT (OUTPATIENT)
Dept: PRIMARY CARE | Facility: CLINIC | Age: 53
End: 2023-11-30
Payer: COMMERCIAL

## 2023-11-30 VITALS
SYSTOLIC BLOOD PRESSURE: 156 MMHG | OXYGEN SATURATION: 99 % | HEART RATE: 125 BPM | DIASTOLIC BLOOD PRESSURE: 88 MMHG | TEMPERATURE: 97.1 F

## 2023-11-30 DIAGNOSIS — M62.838 MUSCLE SPASM: ICD-10-CM

## 2023-11-30 DIAGNOSIS — E78.00 HYPERCHOLESTEREMIA: ICD-10-CM

## 2023-11-30 DIAGNOSIS — M51.26 DEGENERATION OF LUMBAR INTERVERTEBRAL DISC WITH ACUTE HERNIATION: Primary | ICD-10-CM

## 2023-11-30 DIAGNOSIS — M51.36 DEGENERATION OF LUMBAR INTERVERTEBRAL DISC WITH ACUTE HERNIATION: Primary | ICD-10-CM

## 2023-11-30 PROCEDURE — 99496 TRANSJ CARE MGMT HIGH F2F 7D: CPT | Performed by: INTERNAL MEDICINE

## 2023-11-30 PROCEDURE — 1036F TOBACCO NON-USER: CPT | Performed by: INTERNAL MEDICINE

## 2023-11-30 RX ORDER — OXYCODONE AND ACETAMINOPHEN 5; 325 MG/1; MG/1
1 TABLET ORAL EVERY 6 HOURS PRN
Qty: 30 TABLET | Refills: 0 | Status: SHIPPED | OUTPATIENT
Start: 2023-11-30 | End: 2023-12-10

## 2023-11-30 ASSESSMENT — ENCOUNTER SYMPTOMS
HEADACHES: 0
FATIGUE: 0
BLOOD IN STOOL: 0
FEVER: 0
BRUISES/BLEEDS EASILY: 0
SORE THROAT: 0
PALPITATIONS: 0
ARTHRALGIAS: 0
COUGH: 0
BACK PAIN: 1
DIZZINESS: 0
DIFFICULTY URINATING: 0
UNEXPECTED WEIGHT CHANGE: 0
DIARRHEA: 0
SINUS PAIN: 0
ABDOMINAL PAIN: 0
WHEEZING: 0

## 2023-11-30 NOTE — PROGRESS NOTES
Subjective   Patient ID: Rebecca Saenz is a 52 y.o. female who presents for Hospital Follow-up (TCM).    Transition of care  Patient admission history and physical, hospital course, medications, verified and reviewed  Patient contacted after discharge, medications verified comes today for follow-up  Transition of Care     Inpatient facility: [facility]  Discharge diagnosis: Urinary Incontinence/ S/P Fall  Discharged to: Home  Discharge date: 11/28/23  Initial Call date: 11/29/23  Spoke with patient/caregiver: Patient                                                                      Do you need assistance  visits prior to your PCP visit: No  Home health care ordered: No  Have you been contacted by home care and have a start of care date: No  Are you taking medications as prescribed at discharge: Yes     Referral to [facility] Pharmacist: No  Patient advised to bring all medications to PCP follow-up appointment.  Patient advised to follow discharge instructions until provider follow-up.  TCM visit date: 11/30/23  TCM provider visit with: Greg Meyers MD     -Patient admitted for acute L1-L2 disc herniation patient treated from [facility] discharged with home after discharge could not obtain oxycodone 10 unavailable discussed with the discharge physician patient will be switched to Percocet 5 mg comes today in severe pain takes only a muscle relaxant  - History of lumbosacral disc disease and previous surgery patient will be referred back to the [facility] Dr. Mehrdad Stephen for further eval and recommendation  -Switch to Percocet 5 mg every 6 hours as needed given 30 tablets without refill  Refer patient to pain management  - Needs a note to return to work Monday we will follow-up closely as needed    - Hypercholesterolemia patient counseled about low-carb diet exercise  Repeat lipid profile in 3 months as recommended  Obtain cardiac calcium score for further eval recommendation made to proceed  as recommended  -Depression slowly improving need now to increase Cymbalta to 60 mg daily for 4 weeks then twice a day discontinue Paxil  -Restless leg syndrome continue with Requip  -Lumbosacral pain continue with gabapentin discontinue tizanidine due to risk for interaction low blood pressure patient is aware  -Nephrolithiasis increase fluid intake counseled about prevention  -History of back surgery x5 uncontrolled continue with physical therapy pain management continue current medication  - History of hysterectomy and bilateral oophorectomy more than 20 years ago not on any hormonal replacement need to continue with calcium and vitamin D at this time  Follow-up lab results closely 4 weeks                           Review of Systems   Constitutional:  Negative for fatigue, fever and unexpected weight change.   HENT:  Negative for congestion, ear discharge, ear pain, mouth sores, sinus pain and sore throat.    Eyes:  Negative for visual disturbance.   Respiratory:  Negative for cough and wheezing.    Cardiovascular:  Negative for chest pain, palpitations and leg swelling.   Gastrointestinal:  Negative for abdominal pain, blood in stool and diarrhea.   Genitourinary:  Negative for difficulty urinating.   Musculoskeletal:  Positive for back pain. Negative for arthralgias.   Skin:  Negative for rash.   Neurological:  Negative for dizziness and headaches.   Hematological:  Does not bruise/bleed easily.   Psychiatric/Behavioral:  Negative for behavioral problems.    All other systems reviewed and are negative.      Objective   Lab Results   Component Value Date    HGBA1C 5.8 (A) 08/21/2023      /88   Pulse (!) 125   Temp 36.2 °C (97.1 °F)   LMP  (LMP Unknown)   SpO2 99%     Physical Exam  Vitals and nursing note reviewed.   Constitutional:       Appearance: Normal appearance.   HENT:      Head: Normocephalic.      Nose: Nose normal.   Eyes:      Conjunctiva/sclera: Conjunctivae normal.      Pupils: Pupils are  equal, round, and reactive to light.   Cardiovascular:      Rate and Rhythm: Regular rhythm.   Pulmonary:      Effort: Pulmonary effort is normal.      Breath sounds: Normal breath sounds.   Abdominal:      General: Abdomen is flat.      Palpations: Abdomen is soft.   Musculoskeletal:         General: Tenderness (Lumbosacral tenderness) present.      Cervical back: Neck supple.   Skin:     General: Skin is warm.   Neurological:      General: No focal deficit present.      Mental Status: She is oriented to person, place, and time.      Comments: Left-sided buttock pain and thigh pain distribution of L1-L2   Psychiatric:         Mood and Affect: Mood normal.         Assessment/Plan   Rebecca was seen today for hospital follow-up.  Diagnoses and all orders for this visit:  Degeneration of lumbar intervertebral disc with acute herniation (Primary)  -     oxyCODONE-acetaminophen (Percocet) 5-325 mg tablet; Take 1 tablet by mouth every 6 hours if needed for severe pain (7 - 10) or moderate pain (4 - 6) for up to 10 days.  -     Referral to Neurosurgery; Future  -     Referral to Pain Medicine; Future  Muscle spasm  Hypercholesteremia   Transition of care  Patient admission history and physical, hospital course, medications, verified and reviewed  Patient contacted after discharge, medications verified comes today for follow-up  Transition of Care     Inpatient facility: [facility]  Discharge diagnosis: Urinary Incontinence/ S/P Fall  Discharged to: Home  Discharge date: 11/28/23  Initial Call date: 11/29/23  Spoke with patient/caregiver: Patient                                                                      Do you need assistance  visits prior to your PCP visit: No  Home health care ordered: No  Have you been contacted by home care and have a start of care date: No  Are you taking medications as prescribed at discharge: Yes     Referral to [facility] Pharmacist: No  Patient advised to bring all  medications to PCP follow-up appointment.  Patient advised to follow discharge instructions until provider follow-up.  TCM visit date: 11/30/23  TCM provider visit with: Greg Meyers MD     -Patient admitted for acute L1-L2 disc herniation patient treated from UTI discharged with home after discharge could not obtain oxycodone 10 unavailable discussed with the discharge physician patient will be switched to Percocet 5 mg comes today in severe pain takes only a muscle relaxant  - History of lumbosacral disc disease and previous surgery patient will be referred back to the [facility] Dr. Mehrdad Stephen for further eval and recommendation  -Switch to Percocet 5 mg every 6 hours as needed given 30 tablets without refill  Refer patient to pain management  - Needs a note to return to work Monday we will follow-up closely as needed    - Hypercholesterolemia patient counseled about low-carb diet exercise  Repeat lipid profile in 3 months as recommended  Obtain cardiac calcium score for further eval recommendation made to proceed as recommended  -Depression slowly improving need now to increase Cymbalta to 60 mg daily for 4 weeks then twice a day discontinue Paxil  -Restless leg syndrome continue with Requip  -Lumbosacral pain continue with gabapentin discontinue tizanidine due to risk for interaction low blood pressure patient is aware  -Nephrolithiasis increase fluid intake counseled about prevention  -History of back surgery x5 uncontrolled continue with physical therapy pain management continue current medication  - History of hysterectomy and bilateral oophorectomy more than 20 years ago not on any hormonal replacement need to continue with calcium and vitamin D at this time  Follow-up lab results closely 4 weeks

## 2023-12-05 ENCOUNTER — APPOINTMENT (OUTPATIENT)
Dept: PAIN MEDICINE | Facility: HOSPITAL | Age: 53
End: 2023-12-05
Payer: COMMERCIAL

## 2023-12-11 ENCOUNTER — OFFICE VISIT (OUTPATIENT)
Dept: PAIN MEDICINE | Facility: HOSPITAL | Age: 53
End: 2023-12-11
Payer: COMMERCIAL

## 2023-12-11 VITALS
HEIGHT: 71 IN | HEART RATE: 114 BPM | SYSTOLIC BLOOD PRESSURE: 145 MMHG | DIASTOLIC BLOOD PRESSURE: 92 MMHG | TEMPERATURE: 99.1 F | BODY MASS INDEX: 30.8 KG/M2 | WEIGHT: 220 LBS

## 2023-12-11 DIAGNOSIS — M79.18 MYOFASCIAL PAIN: ICD-10-CM

## 2023-12-11 DIAGNOSIS — M47.817 FACET ARTHROPATHY, LUMBOSACRAL: ICD-10-CM

## 2023-12-11 DIAGNOSIS — M54.16 CHRONIC LUMBAR RADICULOPATHY: Primary | ICD-10-CM

## 2023-12-11 DIAGNOSIS — M48.062 LUMBAR STENOSIS WITH NEUROGENIC CLAUDICATION: ICD-10-CM

## 2023-12-11 DIAGNOSIS — M96.1 POSTLAMINECTOMY SYNDROME OF LUMBAR REGION: ICD-10-CM

## 2023-12-11 DIAGNOSIS — Z79.899 MEDICATION MANAGEMENT: ICD-10-CM

## 2023-12-11 LAB
AMPHETAMINES UR QL SCN: ABNORMAL
BARBITURATES UR QL SCN: ABNORMAL
BENZODIAZ UR QL SCN: ABNORMAL
BZE UR QL SCN: ABNORMAL
CANNABINOIDS UR QL SCN: ABNORMAL
FENTANYL+NORFENTANYL UR QL SCN: ABNORMAL
OPIATES UR QL SCN: ABNORMAL
OXYCODONE+OXYMORPHONE UR QL SCN: ABNORMAL
PCP UR QL SCN: ABNORMAL

## 2023-12-11 PROCEDURE — 80307 DRUG TEST PRSMV CHEM ANLYZR: CPT | Performed by: NURSE PRACTITIONER

## 2023-12-11 PROCEDURE — 80365 DRUG SCREENING OXYCODONE: CPT | Mod: CONLAB | Performed by: NURSE PRACTITIONER

## 2023-12-11 PROCEDURE — 1036F TOBACCO NON-USER: CPT | Performed by: NURSE PRACTITIONER

## 2023-12-11 PROCEDURE — 99204 OFFICE O/P NEW MOD 45 MIN: CPT | Performed by: NURSE PRACTITIONER

## 2023-12-11 PROCEDURE — 80361 OPIATES 1 OR MORE: CPT | Mod: CONLAB | Performed by: NURSE PRACTITIONER

## 2023-12-11 PROCEDURE — 99214 OFFICE O/P EST MOD 30 MIN: CPT | Mod: ZK | Performed by: NURSE PRACTITIONER

## 2023-12-11 RX ORDER — CYCLOBENZAPRINE HCL 10 MG
10 TABLET ORAL 3 TIMES DAILY PRN
Qty: 60 TABLET | Refills: 2 | Status: SHIPPED | OUTPATIENT
Start: 2023-12-11 | End: 2024-04-30

## 2023-12-11 RX ORDER — GABAPENTIN 600 MG/1
1200 TABLET ORAL 3 TIMES DAILY
Qty: 180 TABLET | Refills: 2 | Status: SHIPPED | OUTPATIENT
Start: 2023-12-11 | End: 2024-03-19 | Stop reason: SDUPTHER

## 2023-12-11 ASSESSMENT — ENCOUNTER SYMPTOMS
CARDIOVASCULAR NEGATIVE: 1
MYALGIAS: 1
ALLERGIC/IMMUNOLOGIC NEGATIVE: 1
BACK PAIN: 1
NECK STIFFNESS: 0
PSYCHIATRIC NEGATIVE: 1
RESPIRATORY NEGATIVE: 1
EYES NEGATIVE: 1
ENDOCRINE NEGATIVE: 1
NEUROLOGICAL NEGATIVE: 1
ARTHRALGIAS: 1
JOINT SWELLING: 0
GASTROINTESTINAL NEGATIVE: 1
CONSTITUTIONAL NEGATIVE: 1

## 2023-12-11 ASSESSMENT — PAIN SCALES - GENERAL: PAINLEVEL: 8

## 2023-12-11 NOTE — H&P (VIEW-ONLY)
Subjective   Patient ID: Rebecca Saenz is a 52 y.o. female who presents for Pain (Lower left side back radiating up and down left leg).    Rebecca is a 52-year-old  female who is here to establish care with pain management.  Patient is ambulatory.  Gait is steady.  She arrives by herself.  She was referred to us by her PCP, Dr. Meyers.    Patient has chronic lower back pain that goes down to her left side.  She rates her pain as 8 out of 10.  Pain comes and goes depending on her level of activity or it can be constant.  She describes it as burning, cramping, sharp, shooting, spastic, stabbing, tender and throbbing kind of pain.  Pain travels down to her left leg.  She has numbness, decreased sensation, pins and needle sensation.  She reports back and leg pain started when she fell from the ladder after Thanksgiving.  She reports she was painting the trimming on her second story window and fell off the ladder.  She had an ED visit where they did the MRI of her lumbar spine.  There was no fracture.  However her back and leg pain has been worsening since.  She also mentioned that she wakes up in the middle of a sound sleep with her leg cramping and a charley horse sensation.  She mentioned pain during this time is worse that she yells out.  She denies increasing leg weakness or change in balance.  She denies bowel or bladder incontinence.  She denies back injury.  She denies joint replacement.    Patient has history of back surgery.  She initially had it done in 2004 then 2005 in [place].  She had another back surgery in [place] on 2018.  The latest surgery was on May 2022 with Dr. Stephen in [place].  Based on MRI, she had laminectomy and fusion at L3-S1.  She was in the service and was medically discharged in 1999.     Patient is taking gabapentin 1200 mg 3 times a day.  She is on Flexeril.  She is taking duloxetine.  She had seen [facility] pain provider.  She reports she was not discharged from their care but  she had different insurance and her pain level had changed.  Patient also had physical therapy in the past and is continuing the home stretching exercise.  She reports the treatment did not help with her leg pain.  She was prescribed Percocet and took that last night.    Patient reports that she continues to work.  She works as an  manufacturing spare parts.  She is not on her feet for the most part or sedentary.  Patient wants to continue working.  She would like to have a work excuse on hand in case she needs it that allows her to work at home if her pain level is increased.  She will have to discuss this with her PCP.    Patient had MRI of her lumbar spine that was done on 11/27/2023 and I reviewed the results.  I discussed the plan of care including pharmacologic and joint interventional procedure.  I discussed  transforaminal BERENICE and she is in agreement to proceed to this procedure.  This is a therapeutic procedure done under fluoroscopy.  Questions were answered during this encounter.        Past Medical History  She has no past medical history on file.    Surgical History  Past Surgical History:   Procedure Laterality Date    BACK SURGERY  05/19/2018    Back Surgery    EYE SURGERY  05/19/2018    Eye Surgery        Social History  She reports that she has quit smoking. Her smoking use included cigarettes. She has never used smokeless tobacco. She reports that she does not drink alcohol and does not use drugs.    Family History  Family History   Problem Relation Name Age of Onset    No Known Problems Mother      No Known Problems Father          Allergies  Sulfamethoxazole      Current Outpatient Medications:     calcium carbonate (CALCIUM 500 ORAL), Take by mouth., Disp: , Rfl:     cholecalciferol (Vitamin D3) 25 MCG (1000 UT) tablet, Take 1 tablet (25 mcg) by mouth once daily., Disp: , Rfl:     cyanocobalamin (Vitamin B-12) 1,000 mcg tablet, Take 100 mcg by mouth once daily., Disp: , Rfl:      DULoxetine (Cymbalta) 60 mg DR capsule, Take 1 capsule (60 mg) by mouth 2 times a day. Do not crush or chew., Disp: 180 capsule, Rfl: 1    garlic 500 mg capsule, Take by mouth., Disp: , Rfl:     rOPINIRole (Requip) 1 mg tablet, Take 1 tablet (1 mg) by mouth once daily at bedtime., Disp: 90 tablet, Rfl: 1    acetaminophen (Tylenol) 325 mg tablet, Take 2 tablets (650 mg) by mouth every 4 hours if needed for fever (temp greater than 38.0 C) (greater than or equal to 38 C)., Disp: 30 tablet, Rfl: 0    cyclobenzaprine (Flexeril) 10 mg tablet, Take 1 tablet (10 mg) by mouth 3 times a day as needed for muscle spasms., Disp: 60 tablet, Rfl: 2    gabapentin (Neurontin) 600 mg tablet, Take 2 tablets (1,200 mg) by mouth 3 times a day., Disp: 180 tablet, Rfl: 2    polyethylene glycol (Glycolax, Miralax) 17 gram packet, Take 17 g by mouth once daily. Do not start before November 29, 2023., Disp: , Rfl:      Review of Systems   Constitutional: Negative.    HENT: Negative.     Eyes: Negative.    Respiratory: Negative.     Cardiovascular: Negative.    Gastrointestinal: Negative.    Endocrine: Negative.    Genitourinary: Negative.    Musculoskeletal:  Positive for arthralgias, back pain and myalgias. Negative for gait problem, joint swelling and neck stiffness.   Skin: Negative.    Allergic/Immunologic: Negative.    Neurological: Negative.    Psychiatric/Behavioral: Negative.          Physical Exam  Vitals and nursing note reviewed.   HENT:      Head: Normocephalic.      Nose: Nose normal.   Eyes:      Extraocular Movements: Extraocular movements intact.      Conjunctiva/sclera: Conjunctivae normal.      Pupils: Pupils are equal, round, and reactive to light.   Cardiovascular:      Rate and Rhythm: Normal rate and regular rhythm.   Pulmonary:      Effort: Pulmonary effort is normal.      Breath sounds: Normal breath sounds.   Musculoskeletal:         General: Tenderness present. No swelling, deformity or signs of injury.       Cervical back: No rigidity or tenderness.      Lumbar back: Tenderness present.      Right lower leg: No edema.      Left lower leg: No edema.      Comments: No neck rigidity.  Full range of motion.  Negative for Spurling test bilaterally.  Negative for paraspinal tenderness at the cervical region.  Positive for scoliosis.  Uneven waistline.  Positive for paraspinal tenderness at the lumbar region bilaterally at L4-L5, L5-S1 with slight rotation.  Positive for tactile hyperesthesia.  With left-sided radicular symptoms that follows L3-L4-L5 distribution.  Presence of long scar at the lumbar spine from previous surgery.  Positive for left SI joint pain on palpation.  Positive left leg raise eliciting back pain.  BUE 5/5, RLE 5/5, LLE 4/5.   Skin:     General: Skin is warm and dry.   Neurological:      General: No focal deficit present.      Mental Status: She is alert and oriented to person, place, and time.   Psychiatric:         Mood and Affect: Mood normal.         Behavior: Behavior normal.          Last Recorded Vitals  BP (!) 145/92   Pulse (!) 114   Temp 37.3 °C (99.1 °F) (Temporal)   Wt 99.8 kg (220 lb)     Relevant Results      Pain Management Panel          Latest Ref Rng & Units 12/11/2023   Pain Management Panel   Amphetamine Screen, Urine Presumptive Negative Presumptive Negative    Barbiturate Screen, Urine Presumptive Negative Presumptive Negative    Benzodiazepines Screen, Urine Presumptive Negative Presumptive Negative    Fentanyl Screen, Urine Presumptive Negative Presumptive Negative         Narrative & Impression   Interpreted By:  Rohit Billy,   STUDY:  MR LUMBAR SPINE WO IV CONTRAST;  11/28/2023 12:52 pm      INDICATION:  Signs/Symptoms:needs MRI suppression for metal.      COMPARISON:  None.      ACCESSION NUMBER(S):  [id number]      ORDERING CLINICIAN:  ZAC BARRETT      TECHNIQUE:  Multiplanar and multisequential MR images of the lumbar spine are  performed.       FINDINGS:  Postoperative changes are identified with multilevel posterior  decompression and fusion of the lumbar spine. Bilateral spinal rods  and pedicle screws are identified from L2 through L5. Disc space  cages are identified from L3 through S1. There is partial bony  contiguity across at L4-5. There is some ill-defined fluid and edema  at the surgical sites at the midline. There is no organized fluid  collection.      There is mild levo convexity of the lumbar spine. The lumbar  vertebral body AP alignment is within normal limits.      There are mild discogenic degenerative changes at T11-12, L1-2, and  L2-3 with disc desiccation disc space narrowing. There is  degenerative endplate signal at multiple levels. The lumbar vertebral  body heights are maintained. There is no acute bone marrow edema.      The conus medullaris is of normal morphology. The tip of the conus  descends to the level of the L2 superior endplate.      Axial images are performed through the lumbar disc spaces from the  inferior endplate of T12 through S1.      The T12-L1 disc space level is incompletely visualized. There is no  significant central canal or neural foraminal stenosis.      The L1-2 disc space level is partially obscured by postsurgical  artifact. There is mild bilateral facet arthrosis. There is bulging  disc asymmetric to the left with some mass effect upon the ventral  thecal sac and mild central canal narrowing. There is broad-based  disc protrusion on the left extending into the caudal left neural  foramen with moderate narrowing of the left neural foramen. There is  a small component of extruded disc which migrates superiorly in the  neural foramen and contacts the transiting left L1 nerve root.      The L2-3 disc space level is partially obscured by postsurgical  artifact. The patient is status post bilateral laminectomy. There is  mild bilateral facet arthrosis. There is minimal bulging disc. There  is disc  encroachment with mild narrowing at the caudal neural foramen  bilaterally. There is no significant central canal narrowing.      The L3-4 disc space level is partially obscured by postsurgical  artifact. The patient is status post bilateral laminectomy and  partial right facetectomy. There is mild bilateral facet arthrosis.  There is mild ridging osteophyte with narrowing at the caudal right  neural foramen. There is no significant central canal narrowing.      The L4-5 disc space level is partially obscured by postsurgical  artifact. The patient is status post bilateral laminectomy. There is  mild to moderate bilateral facet arthrosis with partial ankylosis  bilaterally. There is some ridging osteophyte with mild narrowing at  the caudal left neural foramen. There is minimal disc encroachment on  the caudal right neural foramen. There is no significant central  canal or neural foraminal stenosis.      The L5-S1 disc space level is partially obscured by postsurgical  artifact. The patient is status post bilateral laminectomy asymmetric  to the left. There is mild to moderate bilateral facet arthrosis with  partial ankylosis. There is some ridging osteophyte with narrowing at  the caudal neural foramen bilaterally. There is no significant  central canal stenosis. There is no mass effect upon the thecal sac  or S1 nerve roots.      IMPRESSION:  Multilevel posterior decompression and fusion of the lumbar spine  from L2 through S1.      Mild multilevel discogenic degenerative changes at T11-12, L1-2, and  L2-3. There is multilevel bulging disc with mild central canal  narrowing at L1-2.      There is broad-based disc protrusion at L1-2 on the left extending  into the caudal left neural foramen and moderately narrowing the  neural foramen. There is a small component of extruded disc at the  left neural foramen which migrates superiorly and contacts the  transiting left L1 nerve root.      No acute osseous abnormality.        Assessment/Plan   Problem List Items Addressed This Visit             ICD-10-CM    Degeneration of lumbar intervertebral disc M51.36    Relevant Medications    gabapentin (Neurontin) 600 mg tablet    Chronic lumbar radiculopathy - Primary M54.16    Relevant Medications    gabapentin (Neurontin) 600 mg tablet    Other Relevant Orders    Transforaminal    Lumbar stenosis with neurogenic claudication M48.062    Relevant Medications    gabapentin (Neurontin) 600 mg tablet    Other Relevant Orders    Transforaminal    Postlaminectomy syndrome of lumbar region M96.1    Relevant Medications    gabapentin (Neurontin) 600 mg tablet    Other Relevant Orders    Transforaminal    Myofascial pain M79.18    Relevant Medications    cyclobenzaprine (Flexeril) 10 mg tablet     Other Visit Diagnoses         Codes    Medication management     Z79.899    Relevant Orders    Opiate Confirmation, Urine    OOB Internal Tracking                   1. Chronic lumbar radiculopathy  - Transforaminal; Future  - gabapentin (Neurontin) 600 mg tablet; Take 2 tablets (1,200 mg) by mouth 3 times a day.  Dispense: 180 tablet; Refill: 2    2. Lumbar stenosis with neurogenic claudication  - Transforaminal; Future  - gabapentin (Neurontin) 600 mg tablet; Take 2 tablets (1,200 mg) by mouth 3 times a day.  Dispense: 180 tablet; Refill: 2    3. Postlaminectomy syndrome of lumbar region  - Transforaminal; Future  - gabapentin (Neurontin) 600 mg tablet; Take 2 tablets (1,200 mg) by mouth 3 times a day.  Dispense: 180 tablet; Refill: 2    4. Facet arthropathy, lumbosacral  - gabapentin (Neurontin) 600 mg tablet; Take 2 tablets (1,200 mg) by mouth 3 times a day.  Dispense: 180 tablet; Refill: 2    5. Myofascial pain  - cyclobenzaprine (Flexeril) 10 mg tablet; Take 1 tablet (10 mg) by mouth 3 times a day as needed for muscle spasms.  Dispense: 60 tablet; Refill: 2    6. Medication management  - Drug Screen, Urine With Reflex to Confirmation  - Opiate  Confirmation, Urine  - OOB Internal Tracking       Plan/Follow-up Instructions:     Continue taking your gabapentin 1200 mg 3 times a day.  Do not take sedating medications together.    Continue taking Flexeril for muscle pain relief.  Do not take sedating medications together.    Continue taking your duloxetine.  Do not take sedating medications together.    You are scheduled for left L3-L4 and L4-L5 TFESI #1 on 1/2/2024 in [place] with Dr. Ching.  No ibuprofen this day.  If getting sedation then you must not eat or drink 6 hours before the procedure and you must have a  with you.  The office will call you for further instructions.  You will then be seen for your postprocedure follow-up in 4 to 6 weeks.      Disclaimer: This note was created using voice recognition software. It was not corrected for typographical or grammatical errors, inadvertent word insertion, or any unintended errors. Please feel free to contact me for clarification.        Nilsa Hare DNP, APRN, FNP-C   [facility]  Office #: [phone]  Fax # [phone]

## 2023-12-11 NOTE — PROGRESS NOTES
I have personally reviewed the OAR[facility] report for Rebecca Saenz   . I have considered the risks of abuse, dependence, addiction and diversion.   Is the patient prescribed a combination of a benzodiazepine and opioid? No  Patient tolerating without AE. Benefit outweighs the risk. Discussed risks/benefits with patient. All questions answered. States understanding.     Date of the last Controlled Substance Agreement: 12/11/2023    Last urine drug screening date/ordered today: 12/11/23  Results of last screen: Results as expected.     OPIOID   What is the patient’s goal of therapy? PAIN CONTROL.  Is this being achieved with current treatment? Yes  Attestation statement: I feel that it is clinically indicated to continue this current medication regimen after consideration of alternative therapies, and other non-opioid treatments.     Opioid Risk Screening:   THE OPIOID RISK TOOL (ORT)                               Female                     Male    Alcohol                            [1] = 0                         [3] =   Illegal Drugs                           [2] =    0                       [3]  =     1. Family History of Substance Abuse Prescription Drugs                           [4]= 0                          [4]  =   Alcohol                           [3] = 0                         [3]   =  Illicit Drugs                           [4]= 0                          [4]   =    2. Personal History of Substance Abuse Prescription Drugs                          [5]= 0                           [5]   =    3. Age (If between 16 to 45)                          [1]=  0                         [1]   =    4. History of Preadolescent Sexual Abuse                         [3]= 0                           [0]   =    ADD, OCD, Bipolar, Schizophrenia                         [2]= 0                           [2]   =    5. Psychological Disease Depression                        [1]=1                             [1]   =    TOTAL Score  =  1     Last opioid risk screening date/ordered today: 12/11/2023  Patient's total score is 1    Reference :  Low Score = 0 to 3  Moderate Score = 4 to 7  High Score = =8       Pain Scale Screening:   Pain Assessment and Documentation Tool (PADT)   Date of Assessment: 12/11/2023  Analgesia:   Patient reports her pain level on average during the past week is 9on a 0 - 10 scale.

## 2023-12-11 NOTE — PATIENT INSTRUCTIONS
Continue taking your gabapentin 1200 mg 3 times a day.  Do not take sedating medications together.    Continue taking Flexeril for muscle pain relief.  Do not take sedating medications together.    Continue taking your duloxetine.  Do not take sedating medications together.    You are scheduled for left L3-L4 and L4-L5 TFESI #1 on 1/2/2024 in [place] with Dr. Ching.  No ibuprofen this day.  If getting sedation then you must not eat or drink 6 hours before the procedure and you must have a  with you.  The office will call you for further instructions.  You will then be seen for your postprocedure follow-up in 4 to 6 weeks.

## 2023-12-11 NOTE — PROGRESS NOTES
Subjective   Patient ID: Rebecca Saenz is a 52 y.o. female who presents for Pain (Lower left side back radiating up and down left leg).    Rebecca is a 52-year-old  female who is here to establish care with pain management.  Patient is ambulatory.  Gait is steady.  She arrives by herself.  She was referred to us by her PCP, Dr. Meyers.    Patient has chronic lower back pain that goes down to her left side.  She rates her pain as 8 out of 10.  Pain comes and goes depending on her level of activity or it can be constant.  She describes it as burning, cramping, sharp, shooting, spastic, stabbing, tender and throbbing kind of pain.  Pain travels down to her left leg.  She has numbness, decreased sensation, pins and needle sensation.  She reports back and leg pain started when she fell from the ladder after Thanksgiving.  She reports she was painting the trimming on her second story window and fell off the ladder.  She had an ED visit where they did the MRI of her lumbar spine.  There was no fracture.  However her back and leg pain has been worsening since.  She also mentioned that she wakes up in the middle of a sound sleep with her leg cramping and a charley horse sensation.  She mentioned pain during this time is worse that she yells out.  She denies increasing leg weakness or change in balance.  She denies bowel or bladder incontinence.  She denies back injury.  She denies joint replacement.    Patient has history of back surgery.  She initially had it done in 2004 then 2005 in [place].  She had another back surgery in [place] on 2018.  The latest surgery was on May 2022 with Dr. Stephen in [place].  Based on MRI, she had laminectomy and fusion at L3-S1.  She was in the service and was medically discharged in 1999.     Patient is taking gabapentin 1200 mg 3 times a day.  She is on Flexeril.  She is taking duloxetine.  She had seen [facility] pain provider.  She reports she was not discharged from their care but  she had different insurance and her pain level had changed.  Patient also had physical therapy in the past and is continuing the home stretching exercise.  She reports the treatment did not help with her leg pain.  She was prescribed Percocet and took that last night.    Patient reports that she continues to work.  She works as an  manufacturing spare parts.  She is not on her feet for the most part or sedentary.  Patient wants to continue working.  She would like to have a work excuse on hand in case she needs it that allows her to work at home if her pain level is increased.  She will have to discuss this with her PCP.    Patient had MRI of her lumbar spine that was done on 11/27/2023 and I reviewed the results.  I discussed the plan of care including pharmacologic and joint interventional procedure.  I discussed  transforaminal BERENICE and she is in agreement to proceed to this procedure.  This is a therapeutic procedure done under fluoroscopy.  Questions were answered during this encounter.        Past Medical History  She has no past medical history on file.    Surgical History  Past Surgical History:   Procedure Laterality Date    BACK SURGERY  05/19/2018    Back Surgery    EYE SURGERY  05/19/2018    Eye Surgery        Social History  She reports that she has quit smoking. Her smoking use included cigarettes. She has never used smokeless tobacco. She reports that she does not drink alcohol and does not use drugs.    Family History  Family History   Problem Relation Name Age of Onset    No Known Problems Mother      No Known Problems Father          Allergies  Sulfamethoxazole      Current Outpatient Medications:     calcium carbonate (CALCIUM 500 ORAL), Take by mouth., Disp: , Rfl:     cholecalciferol (Vitamin D3) 25 MCG (1000 UT) tablet, Take 1 tablet (25 mcg) by mouth once daily., Disp: , Rfl:     cyanocobalamin (Vitamin B-12) 1,000 mcg tablet, Take 100 mcg by mouth once daily., Disp: , Rfl:      DULoxetine (Cymbalta) 60 mg DR capsule, Take 1 capsule (60 mg) by mouth 2 times a day. Do not crush or chew., Disp: 180 capsule, Rfl: 1    garlic 500 mg capsule, Take by mouth., Disp: , Rfl:     rOPINIRole (Requip) 1 mg tablet, Take 1 tablet (1 mg) by mouth once daily at bedtime., Disp: 90 tablet, Rfl: 1    acetaminophen (Tylenol) 325 mg tablet, Take 2 tablets (650 mg) by mouth every 4 hours if needed for fever (temp greater than 38.0 C) (greater than or equal to 38 C)., Disp: 30 tablet, Rfl: 0    cyclobenzaprine (Flexeril) 10 mg tablet, Take 1 tablet (10 mg) by mouth 3 times a day as needed for muscle spasms., Disp: 60 tablet, Rfl: 2    gabapentin (Neurontin) 600 mg tablet, Take 2 tablets (1,200 mg) by mouth 3 times a day., Disp: 180 tablet, Rfl: 2    polyethylene glycol (Glycolax, Miralax) 17 gram packet, Take 17 g by mouth once daily. Do not start before November 29, 2023., Disp: , Rfl:      Review of Systems   Constitutional: Negative.    HENT: Negative.     Eyes: Negative.    Respiratory: Negative.     Cardiovascular: Negative.    Gastrointestinal: Negative.    Endocrine: Negative.    Genitourinary: Negative.    Musculoskeletal:  Positive for arthralgias, back pain and myalgias. Negative for gait problem, joint swelling and neck stiffness.   Skin: Negative.    Allergic/Immunologic: Negative.    Neurological: Negative.    Psychiatric/Behavioral: Negative.          Physical Exam  Vitals and nursing note reviewed.   HENT:      Head: Normocephalic.      Nose: Nose normal.   Eyes:      Extraocular Movements: Extraocular movements intact.      Conjunctiva/sclera: Conjunctivae normal.      Pupils: Pupils are equal, round, and reactive to light.   Cardiovascular:      Rate and Rhythm: Normal rate and regular rhythm.   Pulmonary:      Effort: Pulmonary effort is normal.      Breath sounds: Normal breath sounds.   Musculoskeletal:         General: Tenderness present. No swelling, deformity or signs of injury.       Cervical back: No rigidity or tenderness.      Lumbar back: Tenderness present.      Right lower leg: No edema.      Left lower leg: No edema.      Comments: No neck rigidity.  Full range of motion.  Negative for Spurling test bilaterally.  Negative for paraspinal tenderness at the cervical region.  Positive for scoliosis.  Uneven waistline.  Positive for paraspinal tenderness at the lumbar region bilaterally at L4-L5, L5-S1 with slight rotation.  Positive for tactile hyperesthesia.  With left-sided radicular symptoms that follows L3-L4-L5 distribution.  Presence of long scar at the lumbar spine from previous surgery.  Positive for left SI joint pain on palpation.  Positive left leg raise eliciting back pain.  BUE 5/5, RLE 5/5, LLE 4/5.   Skin:     General: Skin is warm and dry.   Neurological:      General: No focal deficit present.      Mental Status: She is alert and oriented to person, place, and time.   Psychiatric:         Mood and Affect: Mood normal.         Behavior: Behavior normal.          Last Recorded Vitals  BP (!) 145/92   Pulse (!) 114   Temp 37.3 °C (99.1 °F) (Temporal)   Wt 99.8 kg (220 lb)     Relevant Results      Pain Management Panel          Latest Ref Rng & Units 12/11/2023   Pain Management Panel   Amphetamine Screen, Urine Presumptive Negative Presumptive Negative    Barbiturate Screen, Urine Presumptive Negative Presumptive Negative    Benzodiazepines Screen, Urine Presumptive Negative Presumptive Negative    Fentanyl Screen, Urine Presumptive Negative Presumptive Negative         Narrative & Impression   Interpreted By:  Rohit Billy,   STUDY:  MR LUMBAR SPINE WO IV CONTRAST;  11/28/2023 12:52 pm      INDICATION:  Signs/Symptoms:needs MRI suppression for metal.      COMPARISON:  None.      ACCESSION NUMBER(S):  [id number]      ORDERING CLINICIAN:  ZAC BARRETT      TECHNIQUE:  Multiplanar and multisequential MR images of the lumbar spine are  performed.       FINDINGS:  Postoperative changes are identified with multilevel posterior  decompression and fusion of the lumbar spine. Bilateral spinal rods  and pedicle screws are identified from L2 through L5. Disc space  cages are identified from L3 through S1. There is partial bony  contiguity across at L4-5. There is some ill-defined fluid and edema  at the surgical sites at the midline. There is no organized fluid  collection.      There is mild levo convexity of the lumbar spine. The lumbar  vertebral body AP alignment is within normal limits.      There are mild discogenic degenerative changes at T11-12, L1-2, and  L2-3 with disc desiccation disc space narrowing. There is  degenerative endplate signal at multiple levels. The lumbar vertebral  body heights are maintained. There is no acute bone marrow edema.      The conus medullaris is of normal morphology. The tip of the conus  descends to the level of the L2 superior endplate.      Axial images are performed through the lumbar disc spaces from the  inferior endplate of T12 through S1.      The T12-L1 disc space level is incompletely visualized. There is no  significant central canal or neural foraminal stenosis.      The L1-2 disc space level is partially obscured by postsurgical  artifact. There is mild bilateral facet arthrosis. There is bulging  disc asymmetric to the left with some mass effect upon the ventral  thecal sac and mild central canal narrowing. There is broad-based  disc protrusion on the left extending into the caudal left neural  foramen with moderate narrowing of the left neural foramen. There is  a small component of extruded disc which migrates superiorly in the  neural foramen and contacts the transiting left L1 nerve root.      The L2-3 disc space level is partially obscured by postsurgical  artifact. The patient is status post bilateral laminectomy. There is  mild bilateral facet arthrosis. There is minimal bulging disc. There  is disc  encroachment with mild narrowing at the caudal neural foramen  bilaterally. There is no significant central canal narrowing.      The L3-4 disc space level is partially obscured by postsurgical  artifact. The patient is status post bilateral laminectomy and  partial right facetectomy. There is mild bilateral facet arthrosis.  There is mild ridging osteophyte with narrowing at the caudal right  neural foramen. There is no significant central canal narrowing.      The L4-5 disc space level is partially obscured by postsurgical  artifact. The patient is status post bilateral laminectomy. There is  mild to moderate bilateral facet arthrosis with partial ankylosis  bilaterally. There is some ridging osteophyte with mild narrowing at  the caudal left neural foramen. There is minimal disc encroachment on  the caudal right neural foramen. There is no significant central  canal or neural foraminal stenosis.      The L5-S1 disc space level is partially obscured by postsurgical  artifact. The patient is status post bilateral laminectomy asymmetric  to the left. There is mild to moderate bilateral facet arthrosis with  partial ankylosis. There is some ridging osteophyte with narrowing at  the caudal neural foramen bilaterally. There is no significant  central canal stenosis. There is no mass effect upon the thecal sac  or S1 nerve roots.      IMPRESSION:  Multilevel posterior decompression and fusion of the lumbar spine  from L2 through S1.      Mild multilevel discogenic degenerative changes at T11-12, L1-2, and  L2-3. There is multilevel bulging disc with mild central canal  narrowing at L1-2.      There is broad-based disc protrusion at L1-2 on the left extending  into the caudal left neural foramen and moderately narrowing the  neural foramen. There is a small component of extruded disc at the  left neural foramen which migrates superiorly and contacts the  transiting left L1 nerve root.      No acute osseous abnormality.        Assessment/Plan   Problem List Items Addressed This Visit             ICD-10-CM    Degeneration of lumbar intervertebral disc M51.36    Relevant Medications    gabapentin (Neurontin) 600 mg tablet    Chronic lumbar radiculopathy - Primary M54.16    Relevant Medications    gabapentin (Neurontin) 600 mg tablet    Other Relevant Orders    Transforaminal    Lumbar stenosis with neurogenic claudication M48.062    Relevant Medications    gabapentin (Neurontin) 600 mg tablet    Other Relevant Orders    Transforaminal    Postlaminectomy syndrome of lumbar region M96.1    Relevant Medications    gabapentin (Neurontin) 600 mg tablet    Other Relevant Orders    Transforaminal    Myofascial pain M79.18    Relevant Medications    cyclobenzaprine (Flexeril) 10 mg tablet     Other Visit Diagnoses         Codes    Medication management     Z79.899    Relevant Orders    Opiate Confirmation, Urine    OOB Internal Tracking                   1. Chronic lumbar radiculopathy  - Transforaminal; Future  - gabapentin (Neurontin) 600 mg tablet; Take 2 tablets (1,200 mg) by mouth 3 times a day.  Dispense: 180 tablet; Refill: 2    2. Lumbar stenosis with neurogenic claudication  - Transforaminal; Future  - gabapentin (Neurontin) 600 mg tablet; Take 2 tablets (1,200 mg) by mouth 3 times a day.  Dispense: 180 tablet; Refill: 2    3. Postlaminectomy syndrome of lumbar region  - Transforaminal; Future  - gabapentin (Neurontin) 600 mg tablet; Take 2 tablets (1,200 mg) by mouth 3 times a day.  Dispense: 180 tablet; Refill: 2    4. Facet arthropathy, lumbosacral  - gabapentin (Neurontin) 600 mg tablet; Take 2 tablets (1,200 mg) by mouth 3 times a day.  Dispense: 180 tablet; Refill: 2    5. Myofascial pain  - cyclobenzaprine (Flexeril) 10 mg tablet; Take 1 tablet (10 mg) by mouth 3 times a day as needed for muscle spasms.  Dispense: 60 tablet; Refill: 2    6. Medication management  - Drug Screen, Urine With Reflex to Confirmation  - Opiate  Confirmation, Urine  - OOB Internal Tracking       Plan/Follow-up Instructions:     Continue taking your gabapentin 1200 mg 3 times a day.  Do not take sedating medications together.    Continue taking Flexeril for muscle pain relief.  Do not take sedating medications together.    Continue taking your duloxetine.  Do not take sedating medications together.    You are scheduled for left L3-L4 and L4-L5 TFESI #1 on 1/2/2024 in [place] with Dr. Ching.  No ibuprofen this day.  If getting sedation then you must not eat or drink 6 hours before the procedure and you must have a  with you.  The office will call you for further instructions.  You will then be seen for your postprocedure follow-up in 4 to 6 weeks.      Disclaimer: This note was created using voice recognition software. It was not corrected for typographical or grammatical errors, inadvertent word insertion, or any unintended errors. Please feel free to contact me for clarification.        Nilsa Hare DNP, APRN, FNP-C   [facility]  Office #: [phone]  Fax # [phone]

## 2023-12-14 ENCOUNTER — TELEPHONE (OUTPATIENT)
Dept: PAIN MEDICINE | Facility: HOSPITAL | Age: 53
End: 2023-12-14
Payer: COMMERCIAL

## 2023-12-14 DIAGNOSIS — M54.16 CHRONIC LUMBAR RADICULOPATHY: Primary | ICD-10-CM

## 2023-12-14 DIAGNOSIS — Z79.899 MEDICATION MANAGEMENT: ICD-10-CM

## 2023-12-14 LAB
6MAM UR CFM-MCNC: <25 NG/ML
CODEINE UR CFM-MCNC: <50 NG/ML
HYDROCODONE CTO UR CFM-MCNC: <25 NG/ML
HYDROMORPHONE UR CFM-MCNC: <25 NG/ML
MORPHINE UR CFM-MCNC: <50 NG/ML
NORHYDROCODONE UR CFM-MCNC: <25 NG/ML
NOROXYCODONE UR CFM-MCNC: 145 NG/ML
OXYCODONE UR CFM-MCNC: <25 NG/ML
OXYMORPHONE UR CFM-MCNC: 97 NG/ML

## 2023-12-14 RX ORDER — HYDROCODONE BITARTRATE AND ACETAMINOPHEN 5; 325 MG/1; MG/1
1 TABLET ORAL 3 TIMES DAILY PRN
Qty: 90 TABLET | Refills: 0 | Status: SHIPPED | OUTPATIENT
Start: 2023-12-14 | End: 2024-01-11 | Stop reason: SDUPTHER

## 2023-12-14 RX ORDER — NALOXONE HYDROCHLORIDE 4 MG/.1ML
4 SPRAY NASAL AS NEEDED
Qty: 2 EACH | Refills: 0 | Status: SHIPPED | OUTPATIENT
Start: 2023-12-14 | End: 2024-12-13

## 2023-12-19 DIAGNOSIS — M54.16 CHRONIC LUMBAR RADICULOPATHY: ICD-10-CM

## 2023-12-20 ENCOUNTER — OFFICE VISIT (OUTPATIENT)
Dept: PRIMARY CARE | Facility: CLINIC | Age: 53
End: 2023-12-20
Payer: COMMERCIAL

## 2023-12-20 VITALS
HEIGHT: 71 IN | SYSTOLIC BLOOD PRESSURE: 174 MMHG | WEIGHT: 228 LBS | DIASTOLIC BLOOD PRESSURE: 102 MMHG | BODY MASS INDEX: 31.92 KG/M2

## 2023-12-20 DIAGNOSIS — E78.5 DYSLIPIDEMIA: ICD-10-CM

## 2023-12-20 DIAGNOSIS — M96.1 POSTLAMINECTOMY SYNDROME OF LUMBAR REGION: Primary | ICD-10-CM

## 2023-12-20 DIAGNOSIS — M51.36 DEGENERATION OF LUMBAR INTERVERTEBRAL DISC: ICD-10-CM

## 2023-12-20 DIAGNOSIS — R03.0 ELEVATED BLOOD PRESSURE READING IN OFFICE WITHOUT DIAGNOSIS OF HYPERTENSION: ICD-10-CM

## 2023-12-20 PROCEDURE — 1036F TOBACCO NON-USER: CPT | Performed by: INTERNAL MEDICINE

## 2023-12-20 PROCEDURE — 99214 OFFICE O/P EST MOD 30 MIN: CPT | Performed by: INTERNAL MEDICINE

## 2023-12-20 ASSESSMENT — ENCOUNTER SYMPTOMS
LOSS OF SENSATION IN FEET: 0
DEPRESSION: 0
OCCASIONAL FEELINGS OF UNSTEADINESS: 0

## 2023-12-20 NOTE — PROGRESS NOTES
"Subjective   Patient ID: Rebecca Saenz is a 53 y.o. female who presents for New Patient Visit.    HPI   Patient is here to establish as new PCP.    She was seen in the hospital covering for Dr. Meyers  She presented after a fall from the ladder with acute back pain.  She went back to the pain management group manage the pain but still a lot of pain.  She is getting nerve blocks.  She wants short-term disability.  Pain management referred her to the primary care doctor.  He recommended her to get it from the pain management    Past medical history: Hypertension, chronic back pain, total hysterectomy, back surgery, endometriosis  Social history: Quit smoking 6 7 years ago rarely drinks  Family history: Father  of bone marrow cancer mother  of old age    Review of Systems    Objective   BP (!) 174/102   Ht 1.803 m (5' 11\")   Wt 103 kg (228 lb)   LMP  (LMP Unknown)   BMI 31.80 kg/m²     Physical Exam  Vitals reviewed.   Constitutional:       Appearance: Normal appearance.   HENT:      Head: Normocephalic and atraumatic.      Right Ear: Tympanic membrane, ear canal and external ear normal.      Left Ear: Tympanic membrane, ear canal and external ear normal.      Nose: Nose normal.      Mouth/Throat:      Pharynx: Oropharynx is clear.   Eyes:      Extraocular Movements: Extraocular movements intact.      Conjunctiva/sclera: Conjunctivae normal.      Pupils: Pupils are equal, round, and reactive to light.   Cardiovascular:      Rate and Rhythm: Normal rate and regular rhythm.      Pulses: Normal pulses.      Heart sounds: Normal heart sounds.   Pulmonary:      Effort: Pulmonary effort is normal.      Breath sounds: Normal breath sounds.   Abdominal:      General: Abdomen is flat. Bowel sounds are normal.      Palpations: Abdomen is soft.   Musculoskeletal:         General: Tenderness present.      Cervical back: Normal range of motion and neck supple.   Skin:     General: Skin is warm and dry. "   Neurological:      General: No focal deficit present.      Mental Status: She is alert and oriented to person, place, and time.   Psychiatric:         Mood and Affect: Mood normal.         Assessment/Plan   Problem List Items Addressed This Visit             ICD-10-CM       Musculoskeletal and Injuries    Postlaminectomy syndrome of lumbar region - Primary M96.1       Neuro    Degeneration of lumbar intervertebral disc M51.36     Other Visit Diagnoses         Codes    Dyslipidemia     E78.5    Elevated blood pressure reading in office without diagnosis of hypertension     R03.0          Patient's chart reviewed  Repeat blood pressure 122/82  Explained patient that if pain management wants to give her time off if that is what they have suggested they have to write.  Note about giving her short-term disability  I will be happy to write a note based on the recommendation  For long-term disability which patient does not want to seeK at this point she needs to go to her primary back surgeon  Bone density ordered as patient had mild osteopenia  Cholesterol ordered to be done in 6 months as recently her cholesterol was very high

## 2024-01-02 ENCOUNTER — HOSPITAL ENCOUNTER (OUTPATIENT)
Dept: RADIOLOGY | Facility: HOSPITAL | Age: 54
Discharge: HOME | End: 2024-01-02
Payer: COMMERCIAL

## 2024-01-02 ENCOUNTER — HOSPITAL ENCOUNTER (OUTPATIENT)
Dept: PAIN MEDICINE | Facility: HOSPITAL | Age: 54
Discharge: HOME | End: 2024-01-02
Payer: COMMERCIAL

## 2024-01-02 VITALS
TEMPERATURE: 97.9 F | RESPIRATION RATE: 16 BRPM | SYSTOLIC BLOOD PRESSURE: 140 MMHG | OXYGEN SATURATION: 100 % | DIASTOLIC BLOOD PRESSURE: 99 MMHG | HEART RATE: 97 BPM

## 2024-01-02 DIAGNOSIS — M54.16 CHRONIC LUMBAR RADICULOPATHY: ICD-10-CM

## 2024-01-02 DIAGNOSIS — M79.605 LUMBAR PAIN WITH RADIATION DOWN LEFT LEG: ICD-10-CM

## 2024-01-02 DIAGNOSIS — M54.50 LUMBAR PAIN WITH RADIATION DOWN LEFT LEG: ICD-10-CM

## 2024-01-02 DIAGNOSIS — M96.1 POSTLAMINECTOMY SYNDROME OF LUMBAR REGION: ICD-10-CM

## 2024-01-02 DIAGNOSIS — M48.062 LUMBAR STENOSIS WITH NEUROGENIC CLAUDICATION: ICD-10-CM

## 2024-01-02 PROCEDURE — 94760 N-INVAS EAR/PLS OXIMETRY 1: CPT

## 2024-01-02 PROCEDURE — 64484 NJX AA&/STRD TFRM EPI L/S EA: CPT | Performed by: ANESTHESIOLOGY

## 2024-01-02 PROCEDURE — 77003 FLUOROGUIDE FOR SPINE INJECT: CPT

## 2024-01-02 PROCEDURE — 64483 NJX AA&/STRD TFRM EPI L/S 1: CPT | Performed by: ANESTHESIOLOGY

## 2024-01-02 RX ORDER — SODIUM CHLORIDE, SODIUM LACTATE, POTASSIUM CHLORIDE, CALCIUM CHLORIDE 600; 310; 30; 20 MG/100ML; MG/100ML; MG/100ML; MG/100ML
100 INJECTION, SOLUTION INTRAVENOUS CONTINUOUS
Status: DISCONTINUED | OUTPATIENT
Start: 2024-01-02 | End: 2024-01-03 | Stop reason: HOSPADM

## 2024-01-02 ASSESSMENT — COLUMBIA-SUICIDE SEVERITY RATING SCALE - C-SSRS
2. HAVE YOU ACTUALLY HAD ANY THOUGHTS OF KILLING YOURSELF?: NO
6. HAVE YOU EVER DONE ANYTHING, STARTED TO DO ANYTHING, OR PREPARED TO DO ANYTHING TO END YOUR LIFE?: NO
1. IN THE PAST MONTH, HAVE YOU WISHED YOU WERE DEAD OR WISHED YOU COULD GO TO SLEEP AND NOT WAKE UP?: NO

## 2024-01-02 ASSESSMENT — PAIN SCALES - GENERAL
PAINLEVEL_OUTOF10: 0 - NO PAIN
PAINLEVEL_OUTOF10: 8

## 2024-01-02 ASSESSMENT — PAIN - FUNCTIONAL ASSESSMENT
PAIN_FUNCTIONAL_ASSESSMENT: 0-10
PAIN_FUNCTIONAL_ASSESSMENT: 0-10

## 2024-01-02 NOTE — OP NOTE
Date: 2024  OR Location: GEN NON-OR PROCEDURES    Name: Rebecca Saenz, : 1970, Age: 53 y.o., MRN: [id number], Sex: female    Diagnosis   1. Lumbar stenosis with neurogenic claudication  Transforaminal    Transforaminal      2. Chronic lumbar radiculopathy  Transforaminal    Transforaminal      3. Postlaminectomy syndrome of lumbar region  Transforaminal    Transforaminal        Patient has no changes in health medical management or allergies since last visit.    Preop diagnosis: Lumbosacral radiculopathy  Postop diagnosis: Same  Operation performed:  #1  With family there transforaminal epidural steroid injection with fluoroscopic guidance.   #2 epidurography    Procedures   ANESTHESIA: Local  ESTIMATED BLOOD LOSS: None  COMPLICATIONS: None  PROCEDURE: A 22-gauge IV was started in the patient's left hand. The patient was taken to the operating room, placed in the prone position where sterile prep and drapes were done. Supplemental oxygen was given to the patient at 2 L per minute. Blood pressure and pulse ox remained stable throughout the case. Using fluoroscopic guidance, a single 25-gauge 3-1/2 inch spinal needle was inserted into the neural foramen at L1-2 and 2-3 on the left.  A cross table lateral identified the needle tip at the vertebral body. Aspiration was negative of cerebrospinal fluid or blood.  One mL of Omnipaque 300 mg contrast media was injected into each spinal needle. The L1 and L2 nerve roots and epidural space was visualized in both AP and each lateral views with the fluoroscope. One mL of 2% xylocaine MPF and 6 mg of Celestone was injected into each of the 2 needles. The needles were then removed and sterile bandage was applied along with Neosporin ointment to the puncture sites. The patient was taken to the recovery room with no neurologic deficits or pain noted. The patient is scheduled for a follow-up visit.    The patient tolerated the procedure very well and was transferred to  the Recovery Room in stable condition.  The patient had stable resolution of symptoms.  Patient to follow-up in the Pain Management Clinic as scheduled.

## 2024-01-02 NOTE — DISCHARGE INSTRUCTIONS
Discharge Instructions:   ° Keep Band-Aid on for the next 24 hours.    ° No showering/bathing for the next 24 hours.    ° You may notice soreness or increased pain in the area of your injection, which may continue for the first 48 hours.    ° Avoid driving or operating any heavy machinery until the next day after the procedure.  ° You should resume any medications and your regular diet after the procedure.  ° You may resume regular daily activity but should avoid strenuous activity the day of the procedure.  Some of the side affects you may experience from the steroids are:  ° Insomnia (inability to sleep)  ° Increased sweating  ° Headaches  ° Increased fluid retention (swelling of your extremities)  ° Increase appetite  ° Face flushing  ° If you are a diabetic, your blood sugars may go up.  Closely monitor your diet.  Your blood sugar should return to normal in a few days.  Complications:   ° Complications are rare with the most common being temporary increase pain near the injection site. You can apply ice to affected area on the day of the procedure.   ° If the discomfort persists, apply moist heat to the area. Serious complications are very uncommon but may include bleeding, infection or nerve damage.   ° If severe pain, fever, redness or swelling near the injection site, have someone take you to the nearest emergency room to be evaluated for procedure complications or infection.  Expectations:   ° Local anesthetics wear off in several hours but duration of relief varies from individual to individual.     If you have any questions, please call the office at [phone].    If this is an emergency, call [phone] or go to your nearest hospital.

## 2024-01-02 NOTE — INTERVAL H&P NOTE
H&P reviewed. The patient was examined and there are no changes to the H&P.    Pain is increasing. OARRS reviewed by did not bring in Norco to count. She is not getting sedation. Fell going up the stairs 3 days ago. Believes she injured her ribs under the left side. She is able to take deep breaths sitting up straight. We will be reviewing her MRI. States she has numbness to her groin and has been having bowel incontinence. Switching to L1-L2, L2-L3 transforaminal. Denies fever, chills, some SOB from rib injury, CP, n/v/d. Heart and lung exam normal.

## 2024-01-04 ASSESSMENT — PAIN SCALES - GENERAL: PAINLEVEL_OUTOF10: 6

## 2024-01-04 NOTE — SIGNIFICANT EVENT
Patient states that she has been awake since 0000 with intermittent diarrhea.  Patient states that she was not having diarrhea prior to her procedure and has not been exposed to anyone who has been sick recently.

## 2024-01-04 NOTE — ADDENDUM NOTE
Encounter addended by: Melody Chowdary RN on: 1/4/2024 1:41 PM   Actions taken: Contacts section saved, Clinical Note Signed, Flowsheet accepted

## 2024-01-05 ENCOUNTER — TELEPHONE (OUTPATIENT)
Dept: OPERATING ROOM | Facility: HOSPITAL | Age: 54
End: 2024-01-05
Payer: COMMERCIAL

## 2024-01-09 ENCOUNTER — TELEPHONE (OUTPATIENT)
Dept: PAIN MEDICINE | Facility: HOSPITAL | Age: 54
End: 2024-01-09
Payer: COMMERCIAL

## 2024-01-09 DIAGNOSIS — M54.16 CHRONIC LUMBAR RADICULOPATHY: ICD-10-CM

## 2024-01-11 ENCOUNTER — TELEPHONE (OUTPATIENT)
Dept: OPERATING ROOM | Facility: HOSPITAL | Age: 54
End: 2024-01-11
Payer: COMMERCIAL

## 2024-01-11 RX ORDER — HYDROCODONE BITARTRATE AND ACETAMINOPHEN 5; 325 MG/1; MG/1
1 TABLET ORAL 3 TIMES DAILY PRN
Qty: 90 TABLET | Refills: 0 | Status: SHIPPED | OUTPATIENT
Start: 2024-01-12 | End: 2024-01-30 | Stop reason: SDUPTHER

## 2024-01-30 ENCOUNTER — OFFICE VISIT (OUTPATIENT)
Dept: PAIN MEDICINE | Facility: HOSPITAL | Age: 54
End: 2024-01-30
Payer: COMMERCIAL

## 2024-01-30 VITALS
HEIGHT: 71 IN | DIASTOLIC BLOOD PRESSURE: 78 MMHG | WEIGHT: 223.77 LBS | OXYGEN SATURATION: 99 % | RESPIRATION RATE: 17 BRPM | HEART RATE: 88 BPM | BODY MASS INDEX: 31.33 KG/M2 | TEMPERATURE: 97 F | SYSTOLIC BLOOD PRESSURE: 140 MMHG

## 2024-01-30 DIAGNOSIS — M54.16 CHRONIC LUMBAR RADICULOPATHY: ICD-10-CM

## 2024-01-30 DIAGNOSIS — M48.062 LUMBAR STENOSIS WITH NEUROGENIC CLAUDICATION: ICD-10-CM

## 2024-01-30 DIAGNOSIS — M96.1 POSTLAMINECTOMY SYNDROME OF LUMBAR REGION: Primary | ICD-10-CM

## 2024-01-30 DIAGNOSIS — M51.26 LUMBAR DISC HERNIATION: ICD-10-CM

## 2024-01-30 PROCEDURE — 99213 OFFICE O/P EST LOW 20 MIN: CPT | Performed by: ANESTHESIOLOGY

## 2024-01-30 PROCEDURE — 1036F TOBACCO NON-USER: CPT | Performed by: ANESTHESIOLOGY

## 2024-01-30 RX ORDER — HYDROCODONE BITARTRATE AND ACETAMINOPHEN 5; 325 MG/1; MG/1
1 TABLET ORAL 3 TIMES DAILY PRN
Qty: 90 TABLET | Refills: 0 | Status: SHIPPED | OUTPATIENT
Start: 2024-01-30 | End: 2024-03-04 | Stop reason: SDUPTHER

## 2024-01-30 ASSESSMENT — PAIN - FUNCTIONAL ASSESSMENT: PAIN_FUNCTIONAL_ASSESSMENT: 0-10

## 2024-01-30 ASSESSMENT — PAIN SCALES - GENERAL
PAINLEVEL: 5
PAINLEVEL_OUTOF10: 5 - MODERATE PAIN

## 2024-01-30 NOTE — PROGRESS NOTES
Patient is a  53 year old female who presents today for her post procedure appointment.  Patient had a Left L3-L4 & L4-L5 TFESI on 01/02/2024.  Patient states she received 20% relief in her pain and that the pain is slowly returning.  Patients chief complaint today is pain to her lower back that radiates down her bilateral legs to her knees.  Patient describes her pain as aching, sharp, shooting, stabbing and tender.  Patient states she initially injured her back after falling from a ladder on a second story in November 2023. Patient currently takes Norco for her pain and feels she received 50% relief with the medication.  Patient was unable to  her recent prescription with the last being picked up on 12/14/2023.  Patient did not bring her bottle and stated that it was empty.  Patient is currently able to perform her activities of daily living independently.

## 2024-01-30 NOTE — H&P (VIEW-ONLY)
Subjective   Patient ID: Rebecca Saenz is a 53 y.o. female here today as a follow-up after having a transforaminal BERENICE done on her back on 1 January 2, 2024.  Patient states she has had greater than 50% pain relief and is able to walk without a cane for the first time in months.  She has had a total of 4 previous lumbar laminectomies and was seen by Dr. Stephen at the [facility] this last month who told her to complete the series of caudal epidural blocks.  HPI  Previous lumbar laminectomy x 4  Review of Systems  Unremarkable except for chief complaint.  Patient has chronic low back pain with radicular symptoms involving both lower extremities along the L4-5 L5-S1  Objective   Physical Exam  Gen. appearance:  Patient's alert and oriented ×3 ;cooperative mild to moderate distress  Heart: Regular rate and rhythm  Lungs: Clear to auscultation  Abdomen: Soft nontender  Neuro: Cranial nerves II -XII intact; DTR: +2 over 4 biceps triceps brachial radialis patellar and Achilles  Spinal exam: Positive paraspinal tenderness noted bilaterally L4 5 L5-S1 with flexion extension and rotation/no bony abnormalities  Musculoskeletal:  Upper and lower extremity strength was 4/5 bilaterally  :  deferred  Skin: Warm and dry      Assessment/Plan   Diagnoses and all orders for this visit:  Postlaminectomy syndrome of lumbar region  -     HYDROcodone-acetaminophen (Norco) 5-325 mg tablet; Take 1 tablet by mouth 3 times a day as needed for severe pain (7 - 10).  -     Epidural Steroid Injection; Future  -     FL pain management TC; Future  Lumbar disc herniation  Chronic lumbar radiculopathy  -     HYDROcodone-acetaminophen (Norco) 5-325 mg tablet; Take 1 tablet by mouth 3 times a day as needed for severe pain (7 - 10).  Lumbar stenosis with neurogenic claudication  -     HYDROcodone-acetaminophen (Norco) 5-325 mg tablet; Take 1 tablet by mouth 3 times a day as needed for severe pain (7 - 10).  -      Epidural Steroid Injection; Future  -     FL pain management TC; Future  Risk and benefits of a repeat epidural steroid injection was discussed.  Patient scheduled for a caudal epidural block on 2/20/2024 under local anesthesia.  She was told that take her medication as directed.  Electronic prescription of hydrocodone was sent to her local pharmacy.

## 2024-01-30 NOTE — PROGRESS NOTES
Subjective   Patient ID: Rebecca Saenz is a 53 y.o. female here today as a follow-up after having a transforaminal BERENICE done on her back on 1 January 2, 2024.  Patient states she has had greater than 50% pain relief and is able to walk without a cane for the first time in months.  She has had a total of 4 previous lumbar laminectomies and was seen by Dr. Stephen at the [facility] this last month who told her to complete the series of caudal epidural blocks.  HPI  Previous lumbar laminectomy x 4  Review of Systems  Unremarkable except for chief complaint.  Patient has chronic low back pain with radicular symptoms involving both lower extremities along the L4-5 L5-S1  Objective   Physical Exam  Gen. appearance:  Patient's alert and oriented ×3 ;cooperative mild to moderate distress  Heart: Regular rate and rhythm  Lungs: Clear to auscultation  Abdomen: Soft nontender  Neuro: Cranial nerves II -XII intact; DTR: +2 over 4 biceps triceps brachial radialis patellar and Achilles  Spinal exam: Positive paraspinal tenderness noted bilaterally L4 5 L5-S1 with flexion extension and rotation/no bony abnormalities  Musculoskeletal:  Upper and lower extremity strength was 4/5 bilaterally  :  deferred  Skin: Warm and dry      Assessment/Plan   Diagnoses and all orders for this visit:  Postlaminectomy syndrome of lumbar region  -     HYDROcodone-acetaminophen (Norco) 5-325 mg tablet; Take 1 tablet by mouth 3 times a day as needed for severe pain (7 - 10).  -     Epidural Steroid Injection; Future  -     FL pain management TC; Future  Lumbar disc herniation  Chronic lumbar radiculopathy  -     HYDROcodone-acetaminophen (Norco) 5-325 mg tablet; Take 1 tablet by mouth 3 times a day as needed for severe pain (7 - 10).  Lumbar stenosis with neurogenic claudication  -     HYDROcodone-acetaminophen (Norco) 5-325 mg tablet; Take 1 tablet by mouth 3 times a day as needed for severe pain (7 - 10).  -      Epidural Steroid Injection; Future  -     FL pain management TC; Future  Risk and benefits of a repeat epidural steroid injection was discussed.  Patient scheduled for a caudal epidural block on 2/20/2024 under local anesthesia.  She was told that take her medication as directed.  Electronic prescription of hydrocodone was sent to her local pharmacy.

## 2024-02-15 DIAGNOSIS — M54.16 CHRONIC LUMBAR RADICULOPATHY: Primary | ICD-10-CM

## 2024-02-20 ENCOUNTER — HOSPITAL ENCOUNTER (OUTPATIENT)
Dept: RADIOLOGY | Facility: HOSPITAL | Age: 54
Discharge: HOME | End: 2024-02-20
Payer: COMMERCIAL

## 2024-02-20 ENCOUNTER — HOSPITAL ENCOUNTER (OUTPATIENT)
Dept: PAIN MEDICINE | Facility: HOSPITAL | Age: 54
Discharge: HOME | End: 2024-02-20
Payer: COMMERCIAL

## 2024-02-20 VITALS
WEIGHT: 223 LBS | HEIGHT: 71 IN | OXYGEN SATURATION: 100 % | BODY MASS INDEX: 31.22 KG/M2 | DIASTOLIC BLOOD PRESSURE: 83 MMHG | RESPIRATION RATE: 18 BRPM | SYSTOLIC BLOOD PRESSURE: 139 MMHG | HEART RATE: 112 BPM | TEMPERATURE: 98.6 F

## 2024-02-20 DIAGNOSIS — R52 PAIN: ICD-10-CM

## 2024-02-20 DIAGNOSIS — M54.16 CHRONIC LUMBAR RADICULOPATHY: Primary | ICD-10-CM

## 2024-02-20 DIAGNOSIS — M48.062 LUMBAR STENOSIS WITH NEUROGENIC CLAUDICATION: ICD-10-CM

## 2024-02-20 DIAGNOSIS — M51.26 LUMBAR DISC HERNIATION: ICD-10-CM

## 2024-02-20 DIAGNOSIS — M96.1 POSTLAMINECTOMY SYNDROME OF LUMBAR REGION: ICD-10-CM

## 2024-02-20 PROCEDURE — 62323 NJX INTERLAMINAR LMBR/SAC: CPT | Performed by: ANESTHESIOLOGY

## 2024-02-20 RX ORDER — METHOCARBAMOL 750 MG/1
750 TABLET, FILM COATED ORAL 3 TIMES DAILY
COMMUNITY

## 2024-02-20 RX ORDER — ASCORBIC ACID 500 MG
500 TABLET ORAL
COMMUNITY

## 2024-02-20 ASSESSMENT — COLUMBIA-SUICIDE SEVERITY RATING SCALE - C-SSRS
1. IN THE PAST MONTH, HAVE YOU WISHED YOU WERE DEAD OR WISHED YOU COULD GO TO SLEEP AND NOT WAKE UP?: NO
6. HAVE YOU EVER DONE ANYTHING, STARTED TO DO ANYTHING, OR PREPARED TO DO ANYTHING TO END YOUR LIFE?: NO
2. HAVE YOU ACTUALLY HAD ANY THOUGHTS OF KILLING YOURSELF?: NO

## 2024-02-20 ASSESSMENT — PAIN DESCRIPTION - DESCRIPTORS: DESCRIPTORS: OTHER (COMMENT)

## 2024-02-20 ASSESSMENT — PAIN - FUNCTIONAL ASSESSMENT
PAIN_FUNCTIONAL_ASSESSMENT: 0-10
PAIN_FUNCTIONAL_ASSESSMENT: 0-10

## 2024-02-20 ASSESSMENT — PAIN SCALES - GENERAL
PAINLEVEL_OUTOF10: 7
PAINLEVEL_OUTOF10: 0 - NO PAIN
PAINLEVEL_OUTOF10: 7

## 2024-02-20 NOTE — OP NOTE
Date: 2024  OR Location: GEN NON-OR PROCEDURES    Name: Rebecca Saenz, : 1970, Age: 53 y.o., MRN: [id number], Sex: female    Diagnosis   1. Chronic lumbar radiculopathy  Epidural Steroid Injection    FL pain management TC      2. Postlaminectomy syndrome of lumbar region  Epidural Steroid Injection    Epidural Steroid Injection    Epidural Steroid Injection    FL pain management TC      3. Lumbar stenosis with neurogenic claudication  Epidural Steroid Injection    Epidural Steroid Injection      4. Lumbar disc herniation  Epidural Steroid Injection    FL pain management TC        Clinical Note: The patient is a 53 y.o. year-old female who is here today to undergo caudal epidural block number 1  under local anesthesia.  The patient and has a signed consent on file for today´s BERENICE.   Preop/postop diagnosis:   1. Chronic lumbar radiculopathy  Epidural Steroid Injection    FL pain management TC      2. Postlaminectomy syndrome of lumbar region  Epidural Steroid Injection    Epidural Steroid Injection    Epidural Steroid Injection    FL pain management TC      3. Lumbar stenosis with neurogenic claudication  Epidural Steroid Injection    Epidural Steroid Injection      4. Lumbar disc herniation  Epidural Steroid Injection    FL pain management TC         Anesthesia: local  Procedure: Caudal epidural steroid injection  Procedures    The patient was placed in a prone position on the OR table, sterile prep and drape of lower lumbar spine was done with Betadine and ChloraPrep X 3.  A sterile 22-gauge 1-1/2 inch sterile spinal needle was inserted into the caudal canal. Isovue 2ml outlined the epidural space, as seen on a cross table lateral view with fluoroscopy which was followed by 80mg of Depo Medrol and 4ml of  0.5% Xylocaine plain.  The needle was then removed and a sterile bandage was applied over the puncture site with Neosporin ointment.  The patient was able to tolerate the procedure well and was taken  to the recovery room in stable, satisfactory condition with no neurological deficit. Patient is scheduled for a follow up visit in 1 month.

## 2024-02-20 NOTE — INTERVAL H&P NOTE
H&P reviewed. The patient was examined and there are no changes to the H&P.    States she received about 50% relief up until the last 2 weeks. She feels pain down the outside of her right leg which then crosses over her shin. On her left side her pain continues to wrap around her groin and go down her leg. Otherwise doing well toady. Denies fever, chills, SOB, CP, n/v/d. Heart and lung exam normal.

## 2024-02-20 NOTE — DISCHARGE INSTRUCTIONS
Discharge Instructions:   ° Keep Band-Aid on for the next 24 hours.    ° No showering/bathing for the next 24 hours.    ° You may notice soreness or increased pain in the area of your injection, which may continue for the first 48 hours.    ° Avoid driving or operating any heavy machinery until the next day after the procedure.  ° You should resume any medications and your regular diet after the procedure.  ° You may resume regular daily activity but should avoid strenuous activity the day of the procedure.  Some of the side affects you may experience from the steroids are:  ° Insomnia (inability to sleep)  ° Increased sweating  ° Headaches  ° Increased fluid retention (swelling of your extremities)  ° Increase appetite  ° Face flushing  ° If you are a diabetic, your blood sugars may go up.  Closely monitor your diet.  Your blood sugar should return to normal in a few days.  Complications:   ° Complications are rare with the most common being temporary increase pain near the injection site. You can apply ice to affected area on the day of the procedure.   ° If the discomfort persists, apply moist heat to the area. Serious complications are very uncommon but may include bleeding, infection or nerve damage.   ° If severe pain, fever, redness or swelling near the injection site, have someone take you to the nearest emergency room to be evaluated for procedure complications or infection.  Expectations:   ° Local anesthetics wear off in several hours but duration of relief varies from individual to individual.     If you have any questions, please call the office at [phone].    If this is an emergency, call [phone] or go to your nearest hospital.

## 2024-02-21 ASSESSMENT — PAIN SCALES - GENERAL: PAINLEVEL_OUTOF10: 0 - NO PAIN

## 2024-03-04 ENCOUNTER — TELEPHONE (OUTPATIENT)
Dept: PAIN MEDICINE | Facility: HOSPITAL | Age: 54
End: 2024-03-04
Payer: COMMERCIAL

## 2024-03-04 DIAGNOSIS — M54.16 CHRONIC LUMBAR RADICULOPATHY: ICD-10-CM

## 2024-03-04 DIAGNOSIS — M48.062 LUMBAR STENOSIS WITH NEUROGENIC CLAUDICATION: ICD-10-CM

## 2024-03-04 DIAGNOSIS — M96.1 POSTLAMINECTOMY SYNDROME OF LUMBAR REGION: ICD-10-CM

## 2024-03-04 RX ORDER — HYDROCODONE BITARTRATE AND ACETAMINOPHEN 5; 325 MG/1; MG/1
1 TABLET ORAL 3 TIMES DAILY PRN
Qty: 90 TABLET | Refills: 0 | Status: SHIPPED | OUTPATIENT
Start: 2024-03-04 | End: 2024-04-03 | Stop reason: SDUPTHER

## 2024-03-19 ENCOUNTER — OFFICE VISIT (OUTPATIENT)
Dept: PRIMARY CARE | Facility: CLINIC | Age: 54
End: 2024-03-19
Payer: COMMERCIAL

## 2024-03-19 VITALS
BODY MASS INDEX: 31.22 KG/M2 | SYSTOLIC BLOOD PRESSURE: 124 MMHG | WEIGHT: 223 LBS | HEIGHT: 71 IN | DIASTOLIC BLOOD PRESSURE: 78 MMHG

## 2024-03-19 DIAGNOSIS — E78.00 HYPERCHOLESTEREMIA: Primary | ICD-10-CM

## 2024-03-19 DIAGNOSIS — M96.1 POSTLAMINECTOMY SYNDROME OF LUMBAR REGION: ICD-10-CM

## 2024-03-19 DIAGNOSIS — G25.81 RESTLESS LEG SYNDROME: ICD-10-CM

## 2024-03-19 DIAGNOSIS — M47.817 FACET ARTHROPATHY, LUMBOSACRAL: ICD-10-CM

## 2024-03-19 DIAGNOSIS — F32.9 CHRONIC MAJOR DEPRESSIVE DISORDER: ICD-10-CM

## 2024-03-19 DIAGNOSIS — H93.13 TINNITUS OF BOTH EARS: ICD-10-CM

## 2024-03-19 DIAGNOSIS — M48.062 LUMBAR STENOSIS WITH NEUROGENIC CLAUDICATION: ICD-10-CM

## 2024-03-19 DIAGNOSIS — M54.16 CHRONIC LUMBAR RADICULOPATHY: ICD-10-CM

## 2024-03-19 PROCEDURE — 99214 OFFICE O/P EST MOD 30 MIN: CPT | Performed by: INTERNAL MEDICINE

## 2024-03-19 RX ORDER — ROPINIROLE 1 MG/1
1 TABLET, FILM COATED ORAL NIGHTLY
Qty: 90 TABLET | Refills: 1 | Status: SHIPPED | OUTPATIENT
Start: 2024-03-19

## 2024-03-19 RX ORDER — GABAPENTIN 600 MG/1
1200 TABLET ORAL 3 TIMES DAILY
Qty: 180 TABLET | Refills: 0 | Status: SHIPPED | OUTPATIENT
Start: 2024-03-19 | End: 2024-05-21 | Stop reason: SDUPTHER

## 2024-03-19 NOTE — PROGRESS NOTES
"Subjective   Patient ID: Rebecca Saenz is a 53 y.o. female who presents for neck sweling.    HPI   Patient is here for follow-up  Complaining of having ringing in the ears  Left side of the face feels swollen  Since the fall she is having the symptoms more but because her back was hurting she did not get tension  Follow-up on high cholesterol  Needs refill on restless leg medication  History of total hysterectomy       patient is here to establish as new PCP.    She was seen in the hospital covering for Dr. Meyers  She presented after a fall from the ladder with acute back pain.  She went back to the pain management group manage the pain but still a lot of pain.  She is getting nerve blocks.  She wants short-term disability.  Pain management referred her to the primary care doctor.  He recommended her to get it from the pain management    Past medical history: Hypertension, chronic back pain, total hysterectomy, back surgery, endometriosis  Social history: Quit smoking 6 7 years ago rarely drinks  Family history: Father  of bone marrow cancer mother  of old age    Review of Systems    Objective   /78   Ht 1.803 m (5' 11\")   Wt 101 kg (223 lb)   LMP  (LMP Unknown)   BMI 31.10 kg/m²     Physical Exam  Vitals reviewed.   Constitutional:       Appearance: Normal appearance.   HENT:      Head: Normocephalic and atraumatic.      Right Ear: Tympanic membrane, ear canal and external ear normal.      Left Ear: Tympanic membrane, ear canal and external ear normal.      Nose: Nose normal.      Mouth/Throat:      Pharynx: Oropharynx is clear.   Eyes:      Extraocular Movements: Extraocular movements intact.      Conjunctiva/sclera: Conjunctivae normal.      Pupils: Pupils are equal, round, and reactive to light.   Cardiovascular:      Rate and Rhythm: Normal rate and regular rhythm.      Pulses: Normal pulses.      Heart sounds: Normal heart sounds.   Pulmonary:      Effort: Pulmonary effort is normal.     "  Breath sounds: Normal breath sounds.   Abdominal:      General: Abdomen is flat. Bowel sounds are normal.      Palpations: Abdomen is soft.   Musculoskeletal:         General: Tenderness present.      Cervical back: Normal range of motion and neck supple.   Skin:     General: Skin is warm and dry.   Neurological:      General: No focal deficit present.      Mental Status: She is alert and oriented to person, place, and time.   Psychiatric:         Mood and Affect: Mood normal.         Assessment/Plan   Problem List Items Addressed This Visit             ICD-10-CM       Mental Health    Chronic major depressive disorder F32.9    Relevant Medications    rOPINIRole (Requip) 1 mg tablet    Other Relevant Orders    CBC    Comprehensive Metabolic Panel    Lipid Panel       Musculoskeletal and Injuries    Postlaminectomy syndrome of lumbar region M96.1    Relevant Medications    gabapentin (Neurontin) 600 mg tablet    Other Relevant Orders    CBC    Comprehensive Metabolic Panel    Lipid Panel       Neuro    Chronic lumbar radiculopathy M54.16    Relevant Medications    gabapentin (Neurontin) 600 mg tablet    Other Relevant Orders    CBC    Comprehensive Metabolic Panel    Lipid Panel    Lumbar stenosis with neurogenic claudication M48.062    Relevant Medications    gabapentin (Neurontin) 600 mg tablet    Other Relevant Orders    CBC    Comprehensive Metabolic Panel    Lipid Panel     Other Visit Diagnoses         Codes    Facet arthropathy, lumbosacral     M47.817    Relevant Medications    gabapentin (Neurontin) 600 mg tablet    Other Relevant Orders    CBC    Comprehensive Metabolic Panel    Lipid Panel    Antibiotic-induced tinnitus of both ears     H93.13, T36.95XA    Relevant Orders    Referral to ENT    CBC    Comprehensive Metabolic Panel    Lipid Panel        Past recap   patient's chart reviewed  Repeat blood pressure 122/82  Explained patient that if pain management wants to give her time off if that is what  they have suggested they have to write.  Note about giving her short-term disability  I will be happy to write a note based on the recommendation  For long-term disability which patient does not want to seeK at this point she needs to go to her primary back surgeon  Bone density ordered as patient had mild osteopenia  Cholesterol ordered to be done in 6 months as recently her cholesterol was very high    3/19/2024  Tinnitus refer to ENT  High cholesterol order blood work  Requip given for the restless legs  Gabapentin for the radiculopathy  Follow-up in 3 months

## 2024-04-03 ENCOUNTER — TELEPHONE (OUTPATIENT)
Dept: OPERATING ROOM | Facility: HOSPITAL | Age: 54
End: 2024-04-03
Payer: COMMERCIAL

## 2024-04-03 DIAGNOSIS — M48.062 LUMBAR STENOSIS WITH NEUROGENIC CLAUDICATION: ICD-10-CM

## 2024-04-03 DIAGNOSIS — M54.16 CHRONIC LUMBAR RADICULOPATHY: ICD-10-CM

## 2024-04-03 DIAGNOSIS — M96.1 POSTLAMINECTOMY SYNDROME OF LUMBAR REGION: ICD-10-CM

## 2024-04-03 RX ORDER — HYDROCODONE BITARTRATE AND ACETAMINOPHEN 5; 325 MG/1; MG/1
1 TABLET ORAL 3 TIMES DAILY PRN
Qty: 90 TABLET | Refills: 0 | Status: SHIPPED | OUTPATIENT
Start: 2024-04-03 | End: 2024-04-30

## 2024-04-09 ENCOUNTER — OFFICE VISIT (OUTPATIENT)
Dept: PRIMARY CARE | Facility: CLINIC | Age: 54
End: 2024-04-09
Payer: COMMERCIAL

## 2024-04-09 VITALS
SYSTOLIC BLOOD PRESSURE: 110 MMHG | OXYGEN SATURATION: 97 % | BODY MASS INDEX: 32.72 KG/M2 | DIASTOLIC BLOOD PRESSURE: 78 MMHG | TEMPERATURE: 97.4 F | WEIGHT: 234.6 LBS | HEART RATE: 74 BPM

## 2024-04-09 DIAGNOSIS — S05.02XA ABRASION OF LEFT CORNEA, INITIAL ENCOUNTER: Primary | ICD-10-CM

## 2024-04-09 DIAGNOSIS — M96.1 POSTLAMINECTOMY SYNDROME OF LUMBAR REGION: ICD-10-CM

## 2024-04-09 DIAGNOSIS — E78.00 HYPERCHOLESTEREMIA: ICD-10-CM

## 2024-04-09 DIAGNOSIS — M54.16 CHRONIC LUMBAR RADICULOPATHY: ICD-10-CM

## 2024-04-09 DIAGNOSIS — M51.36 DEGENERATION OF LUMBAR INTERVERTEBRAL DISC: ICD-10-CM

## 2024-04-09 PROCEDURE — 99214 OFFICE O/P EST MOD 30 MIN: CPT | Performed by: INTERNAL MEDICINE

## 2024-04-09 PROCEDURE — 1036F TOBACCO NON-USER: CPT | Performed by: INTERNAL MEDICINE

## 2024-04-09 RX ORDER — TOBRAMYCIN 3 MG/ML
1 SOLUTION/ DROPS OPHTHALMIC
Qty: 20 ML | Refills: 0 | Status: SHIPPED | OUTPATIENT
Start: 2024-04-09 | End: 2024-04-16

## 2024-04-09 ASSESSMENT — ENCOUNTER SYMPTOMS
BRUISES/BLEEDS EASILY: 0
FEVER: 0
ABDOMINAL PAIN: 0
PALPITATIONS: 0
HEADACHES: 0
DIARRHEA: 0
ARTHRALGIAS: 0
SINUS PAIN: 0
FATIGUE: 0
DIZZINESS: 0
UNEXPECTED WEIGHT CHANGE: 0
DIFFICULTY URINATING: 0
COUGH: 0
WHEEZING: 0
SORE THROAT: 0
BLOOD IN STOOL: 0

## 2024-04-09 NOTE — PROGRESS NOTES
Subjective   Patient ID: Rebecca Saenz is a 53 y.o. female who presents for Eye Pain (Debris dropped in left eye from under sink on 4/6/24, thinks she scratched cornea ).    -Patient comes today for evaluation debrides drop in her left eye when she was working under her sink on April 6, 2024 clinical blurring of vision burning sensation  - Physical exam and visual exam left corneal abrasion in the periphery  Patient comes about tobramycin ointment insurance not covering high copayment patient switched to tobramycin eyedrops  Refer patient to ophthalmology for further eval recommendation and monitoring  -Chronic lumbosacral pain not improving under care of pain management patient wants to see neurosurgery for second opinion refer patient Dr. Ngo  -hypercholesterolemia patient counseled about low-carb diet exercise  Repeat lipid profile in 3 months as recommended  Obtain cardiac calcium score for further eval recommendation made to proceed as recommended  -Depression slowly improving need now to increase Cymbalta to 60 mg daily for 4 weeks then twice a day discontinue Paxil  -Restless leg syndrome continue with Requip  -Lumbosacral pain continue with gabapentin discontinue tizanidine due to risk for interaction low blood pressure patient is aware  -Nephrolithiasis increase fluid intake counseled about prevention  -History of back surgery x5 uncontrolled continue with physical therapy pain management continue current medication  - History of hysterectomy and bilateral oophorectomy more than 20 years ago not on any hormonal replacement need to continue with calcium and vitamin D at this time  Follow-up lab results closely 4 weeks             Review of Systems   Constitutional:  Negative for fatigue, fever and unexpected weight change.   HENT:  Negative for congestion, ear discharge, ear pain, mouth sores, sinus pain and sore throat.    Eyes:  Positive for visual disturbance.   Respiratory:  Negative for cough and  wheezing.    Cardiovascular:  Negative for chest pain, palpitations and leg swelling.   Gastrointestinal:  Negative for abdominal pain, blood in stool and diarrhea.   Genitourinary:  Negative for difficulty urinating.   Musculoskeletal:  Negative for arthralgias.   Skin:  Negative for rash.   Neurological:  Negative for dizziness and headaches.   Hematological:  Does not bruise/bleed easily.   Psychiatric/Behavioral:  Negative for behavioral problems.    All other systems reviewed and are negative.      Objective   Lab Results   Component Value Date    HGBA1C 5.8 (A) 08/21/2023      /78   Pulse 74   Temp 36.3 °C (97.4 °F)   Wt 106 kg (234 lb 9.6 oz)   LMP  (LMP Unknown)   SpO2 97%   BMI 32.72 kg/m²     Physical Exam  Vitals and nursing note reviewed.   Constitutional:       Appearance: Normal appearance.   HENT:      Head: Normocephalic.      Nose: Nose normal.   Eyes:      General:         Left eye: No discharge.      Conjunctiva/sclera: Conjunctivae normal.      Pupils: Pupils are equal, round, and reactive to light.      Comments: Left corneal abrasion   Cardiovascular:      Rate and Rhythm: Regular rhythm.   Pulmonary:      Effort: Pulmonary effort is normal.      Breath sounds: Normal breath sounds.   Abdominal:      General: Abdomen is flat.      Palpations: Abdomen is soft.   Musculoskeletal:      Cervical back: Neck supple.   Skin:     General: Skin is warm.   Neurological:      General: No focal deficit present.      Mental Status: She is oriented to person, place, and time.   Psychiatric:         Mood and Affect: Mood normal.         Assessment/Plan   Rebecca was seen today for eye pain.  Diagnoses and all orders for this visit:  Abrasion of left cornea, initial encounter (Primary)  -     tobramycin (Tobrex) 0.3 % ophthalmic solution; Administer 1 drop into the left eye every 3 hours for 7 days.  -     Referral to Ophthalmology; Future  Chronic lumbar radiculopathy  -     Referral to  Neurosurgery; Future  Degeneration of lumbar intervertebral disc  Hypercholesteremia  Postlaminectomy syndrome of lumbar region   -Patient comes today for evaluation debrides drop in her left eye when she was working under her sink on April 6, 2024 clinical blurring of vision burning sensation  - Physical exam and visual exam left corneal abrasion in the periphery  Patient comes about tobramycin ointment insurance not covering high copayment patient switched to tobramycin eyedrops  Refer patient to ophthalmology for further eval recommendation and monitoring  -Chronic lumbosacral pain not improving under care of pain management patient wants to see neurosurgery for second opinion refer patient Dr. Ngo  -hypercholesterolemia patient counseled about low-carb diet exercise  Repeat lipid profile in 3 months as recommended  Obtain cardiac calcium score for further eval recommendation made to proceed as recommended  -Depression slowly improving need now to increase Cymbalta to 60 mg daily for 4 weeks then twice a day discontinue Paxil  -Restless leg syndrome continue with Requip  -Lumbosacral pain continue with gabapentin discontinue tizanidine due to risk for interaction low blood pressure patient is aware  -Nephrolithiasis increase fluid intake counseled about prevention  -History of back surgery x5 uncontrolled continue with physical therapy pain management continue current medication  - History of hysterectomy and bilateral oophorectomy more than 20 years ago not on any hormonal replacement need to continue with calcium and vitamin D at this time  Follow-up lab results closely 4 weeks

## 2024-04-16 ENCOUNTER — OFFICE VISIT (OUTPATIENT)
Dept: PAIN MEDICINE | Facility: HOSPITAL | Age: 54
End: 2024-04-16
Payer: COMMERCIAL

## 2024-04-16 VITALS
WEIGHT: 232.59 LBS | DIASTOLIC BLOOD PRESSURE: 83 MMHG | BODY MASS INDEX: 32.56 KG/M2 | HEART RATE: 110 BPM | HEIGHT: 71 IN | TEMPERATURE: 98.2 F | OXYGEN SATURATION: 98 % | RESPIRATION RATE: 18 BRPM | SYSTOLIC BLOOD PRESSURE: 133 MMHG

## 2024-04-16 DIAGNOSIS — M96.1 POSTLAMINECTOMY SYNDROME OF LUMBAR REGION: ICD-10-CM

## 2024-04-16 DIAGNOSIS — Z79.899 MEDICATION MANAGEMENT: ICD-10-CM

## 2024-04-16 DIAGNOSIS — M48.062 LUMBAR STENOSIS WITH NEUROGENIC CLAUDICATION: ICD-10-CM

## 2024-04-16 DIAGNOSIS — R53.83 OTHER FATIGUE: ICD-10-CM

## 2024-04-16 DIAGNOSIS — F32.9 CHRONIC MAJOR DEPRESSIVE DISORDER: ICD-10-CM

## 2024-04-16 DIAGNOSIS — M54.17 LUMBOSACRAL RADICULOPATHY AT L1: ICD-10-CM

## 2024-04-16 DIAGNOSIS — E78.00 HYPERCHOLESTEREMIA: ICD-10-CM

## 2024-04-16 DIAGNOSIS — E55.9 VITAMIN D DEFICIENCY, UNSPECIFIED: ICD-10-CM

## 2024-04-16 DIAGNOSIS — M54.16 LUMBAR RADICULOPATHY, CHRONIC: ICD-10-CM

## 2024-04-16 DIAGNOSIS — E53.8 VITAMIN B12 DEFICIENCY: ICD-10-CM

## 2024-04-16 DIAGNOSIS — M51.16 LUMBAR DISC HERNIATION WITH RADICULOPATHY: Primary | ICD-10-CM

## 2024-04-16 DIAGNOSIS — Z79.899 HIGH RISK MEDICATION USE: Primary | ICD-10-CM

## 2024-04-16 DIAGNOSIS — I10 HYPERTENSION, UNSPECIFIED TYPE: ICD-10-CM

## 2024-04-16 LAB
AMPHETAMINES UR QL SCN: NORMAL
BARBITURATES UR QL SCN: NORMAL
BZE UR QL SCN: NORMAL
CANNABINOIDS UR QL SCN: NORMAL
CREAT UR-MCNC: 234.3 MG/DL (ref 20–320)
PCP UR QL SCN: NORMAL

## 2024-04-16 PROCEDURE — 99213 OFFICE O/P EST LOW 20 MIN: CPT | Performed by: ANESTHESIOLOGY

## 2024-04-16 PROCEDURE — 82570 ASSAY OF URINE CREATININE: CPT | Mod: 59 | Performed by: ANESTHESIOLOGY

## 2024-04-16 PROCEDURE — 80355 GABAPENTIN NON-BLOOD: CPT | Performed by: ANESTHESIOLOGY

## 2024-04-16 PROCEDURE — 1036F TOBACCO NON-USER: CPT | Performed by: ANESTHESIOLOGY

## 2024-04-16 PROCEDURE — 80346 BENZODIAZEPINES1-12: CPT | Performed by: ANESTHESIOLOGY

## 2024-04-16 ASSESSMENT — PAIN SCALES - GENERAL: PAINLEVEL: 7

## 2024-04-16 NOTE — PROGRESS NOTES
Patient is a 53 year old female who presents today for her post procedure follow up appointment.  Patient had a Caudal BERENICE on 02/20/2024.  Patient initially had 20% relief from the injection and currently has no relief.  Patient states the relief lasted for 10 days.  Patients chief complaint today is pain to her lower back.  Patient describes the pain as aching, cramping, pressure, sharp, shooting, stabbing and tender.  Patient states the pain radiates down her bilateral legs where she experiences decreased sensation.  Patient currently takes Norco for her pain and states she only received 25% relief this past week in her pain with the medication.  Patient does not feel this is enough to make a difference in her everyday life. Patient presented with her Norco bottle containing 30 pills.  Patient is currently having difficulty completing her activities of daily living independently.    I have personally reviewed the OARRS report for Rebecca Saenz   . I have considered the risks of abuse, dependence, addiction and diversion.   Is the patient prescribed a combination of a benzodiazepine and opioid? No  Patient tolerating without AE. Benefit outweighs the risk. Discussed risks/benefits with patient. All questions answered. States understanding.     Date of the last Controlled Substance Agreement: 12/11/2023       Last urine drug screening date/ordered today: 04/16/24     Results of last screen: Results as expected.       Last opioid risk screening date/ordered today: 12/11/2023      Pain Scale Screening:   Pain Assessment and Documentation Tool (PADT)   Date of Assessment: 04/16/2024  Analgesia:   Patient reports her pain level on average during the past week is 6on a 0 - 10 scale.   Patient reports that her pain level at its worst during the past week was 8 on a 0 -10 scale.   25% of pain has been relieved during the past week per patient   Patient states that the amount of pain relief she is now obtaining from her  current pain reliever(s) is enough to make a real difference in her life.   Query to clinician: Is the patient's pain relief clinically significant? N/A  Activities of Daily Living:   Physical functioning: unchanged  Family relationships: unchanged  Social relationships: unchanged  Mood: worse  Sleep patterns: unchanged  Overall functioning: unchanged  Adverse Events: No, Rebecca Saenz is not experiencing side effects from current pain reliever.  Patients overall severity of side effect:none  Specific Analgesic Plan: Continue present regimen.

## 2024-04-16 NOTE — H&P
History Of Present Illness  Rebecca Saenz is a 53 y.o. female presenting with lumbosacral radiculopathy.  She had a MR lumbar spine on 11/28/2023 which reveals a broad-based disc herniation at the L1-2 level extending into the left neuroforamen and causing left S1 nerve root impingement.  She has had a previous lumbar laminectomy and fusion from L2-S1 in 2018 2022.  She also complains of new onset of tremors and is seeing a neurologist at [facility] in the next 2 weeks.  She was evaluated by Dr. Stephen in department neurosurgery at [facility] in January who stated that her stool incontinence was not due to any condition secondary to her spine.  Past Medical History  Past Medical History:   Diagnosis Date    Arthritis     Cataract 2018    Depression     Headache     Hyperlipidemia     Joint pain        Surgical History  Past Surgical History:   Procedure Laterality Date    BACK SURGERY  05/19/2018    Back Surgery    EYE SURGERY  05/19/2018    Eye Surgery    HYSTERECTOMY          Social History  She reports that she has quit smoking. Her smoking use included cigarettes. She has a 22.5 pack-year smoking history. She has never used smokeless tobacco. She reports that she does not drink alcohol and does not use drugs.    Family History  Family History   Problem Relation Name Age of Onset    No Known Problems Mother      Arthritis Father [place]     Cancer Father [place]         Allergies  Sulfamethoxazole    Review of Systems  Remarkable for previous lumbar laminectomy fusion x 4 with chronic radiculopathy L1-2 disc NA history causing left else L1 nerve root impingement;  Physical Exam  Gen. appearance:  Patient's alert and oriented ×3 ;cooperative mild to moderate distress  Heart: Regular rate and rhythm  Lungs: Clear to auscultation  Abdomen: Soft nontender  Neuro: Cranial nerves II -XII intact; DTR: +2 over 4 biceps triceps brachial radialis patellar and Achilles  Spinal exam: Positive  "paraspinal tenderness noted bilaterally L4 5 L5-S1 with flexion extension and rotation/no bony abnormalities  Musculoskeletal:  Upper and lower extremity strength was 4/5 bilaterally  :  deferred  Skin: Warm and dry      Last Recorded Vitals  Blood pressure 133/83, pulse 110, temperature 36.8 °C (98.2 °F), resp. rate 18, height 1.803 m (5' 11\"), weight 105 kg (232 lb 9.4 oz), SpO2 98%.    Relevant Results         Assessment/Plan   Diagnoses and all orders for this visit:  Lumbar disc herniation with radiculopathy  Medication management  -     Opiate/Opioid/Benzo Prescription Compliance; Future  -     Gabapentin,Urine; Future  -     OOB Internal Tracking  Postlaminectomy syndrome of lumbar region  Lumbar stenosis with neurogenic claudication  -     Transforaminal; Future  Lumbosacral radiculopathy at L1  Chronic major depressive disorder  Lumbar radiculopathy, chronic  -     Transforaminal; Future    Patient scheduled for a left L1 transforaminal BERENICE on May 7, 2024.  She stated that she is scheduled to see a neurologist at [facility] secondary to her tremors at rest.       I spent 22 minutes in the professional and overall care of this patient.      Malick Ching, DO    "

## 2024-04-18 DIAGNOSIS — F32.9 CHRONIC MAJOR DEPRESSIVE DISORDER: ICD-10-CM

## 2024-04-18 RX ORDER — DULOXETIN HYDROCHLORIDE 60 MG/1
60 CAPSULE, DELAYED RELEASE ORAL 2 TIMES DAILY
Qty: 180 CAPSULE | Refills: 0 | Status: SHIPPED | OUTPATIENT
Start: 2024-04-18 | End: 2025-04-18

## 2024-04-19 ENCOUNTER — LAB (OUTPATIENT)
Dept: LAB | Facility: LAB | Age: 54
End: 2024-04-19
Payer: COMMERCIAL

## 2024-04-19 DIAGNOSIS — I10 HYPERTENSION, UNSPECIFIED TYPE: ICD-10-CM

## 2024-04-19 DIAGNOSIS — E78.00 HYPERCHOLESTEREMIA: ICD-10-CM

## 2024-04-19 DIAGNOSIS — R53.83 OTHER FATIGUE: ICD-10-CM

## 2024-04-19 DIAGNOSIS — E53.8 VITAMIN B12 DEFICIENCY: ICD-10-CM

## 2024-04-19 DIAGNOSIS — Z79.899 HIGH RISK MEDICATION USE: ICD-10-CM

## 2024-04-19 DIAGNOSIS — E55.9 VITAMIN D DEFICIENCY, UNSPECIFIED: ICD-10-CM

## 2024-04-19 LAB
1OH-MIDAZOLAM UR CFM-MCNC: <25 NG/ML
6MAM UR CFM-MCNC: <25 NG/ML
7AMINOCLONAZEPAM UR CFM-MCNC: <25 NG/ML
A-OH ALPRAZ UR CFM-MCNC: <25 NG/ML
ALBUMIN SERPL BCP-MCNC: 4.6 G/DL (ref 3.4–5)
ALP SERPL-CCNC: 92 U/L (ref 33–110)
ALPRAZ UR CFM-MCNC: <25 NG/ML
ALT SERPL W P-5'-P-CCNC: 16 U/L (ref 7–45)
ANION GAP SERPL CALC-SCNC: 13 MMOL/L (ref 10–20)
AST SERPL W P-5'-P-CCNC: 18 U/L (ref 9–39)
BASOPHILS # BLD AUTO: 0.07 X10*3/UL (ref 0–0.1)
BASOPHILS NFR BLD AUTO: 0.9 %
BILIRUB SERPL-MCNC: 0.4 MG/DL (ref 0–1.2)
BUN SERPL-MCNC: 20 MG/DL (ref 6–23)
CALCIUM SERPL-MCNC: 10 MG/DL (ref 8.6–10.3)
CHLORDIAZEP UR CFM-MCNC: <25 NG/ML
CHLORIDE SERPL-SCNC: 103 MMOL/L (ref 98–107)
CHOLEST SERPL-MCNC: 192 MG/DL (ref 0–199)
CHOLESTEROL/HDL RATIO: 3.8
CLONAZEPAM UR CFM-MCNC: <25 NG/ML
CO2 SERPL-SCNC: 27 MMOL/L (ref 21–32)
CODEINE UR CFM-MCNC: <50 NG/ML
CREAT SERPL-MCNC: 1.19 MG/DL (ref 0.5–1.05)
DIAZEPAM UR CFM-MCNC: <25 NG/ML
EDDP UR CFM-MCNC: <25 NG/ML
EGFRCR SERPLBLD CKD-EPI 2021: 54 ML/MIN/1.73M*2
EOSINOPHIL # BLD AUTO: 0.66 X10*3/UL (ref 0–0.7)
EOSINOPHIL NFR BLD AUTO: 8.7 %
ERYTHROCYTE [DISTWIDTH] IN BLOOD BY AUTOMATED COUNT: 13.7 % (ref 11.5–14.5)
FENTANYL UR CFM-MCNC: <2.5 NG/ML
GLUCOSE SERPL-MCNC: 146 MG/DL (ref 74–99)
HCT VFR BLD AUTO: 40.9 % (ref 36–46)
HDLC SERPL-MCNC: 50.7 MG/DL
HGB BLD-MCNC: 13 G/DL (ref 12–16)
HYDROCODONE CTO UR CFM-MCNC: 258 NG/ML
HYDROMORPHONE UR CFM-MCNC: 108 NG/ML
IMM GRANULOCYTES # BLD AUTO: 0.01 X10*3/UL (ref 0–0.7)
IMM GRANULOCYTES NFR BLD AUTO: 0.1 % (ref 0–0.9)
LDLC SERPL CALC-MCNC: 119 MG/DL
LORAZEPAM UR CFM-MCNC: <25 NG/ML
LYMPHOCYTES # BLD AUTO: 3.29 X10*3/UL (ref 1.2–4.8)
LYMPHOCYTES NFR BLD AUTO: 43.6 %
MAGNESIUM SERPL-MCNC: 1.91 MG/DL (ref 1.6–2.4)
MCH RBC QN AUTO: 30.3 PG (ref 26–34)
MCHC RBC AUTO-ENTMCNC: 31.8 G/DL (ref 32–36)
MCV RBC AUTO: 95 FL (ref 80–100)
METHADONE UR CFM-MCNC: <25 NG/ML
MIDAZOLAM UR CFM-MCNC: <25 NG/ML
MONOCYTES # BLD AUTO: 0.45 X10*3/UL (ref 0.1–1)
MONOCYTES NFR BLD AUTO: 6 %
MORPHINE UR CFM-MCNC: <50 NG/ML
NEUTROPHILS # BLD AUTO: 3.07 X10*3/UL (ref 1.2–7.7)
NEUTROPHILS NFR BLD AUTO: 40.7 %
NON HDL CHOLESTEROL: 141 MG/DL (ref 0–149)
NORDIAZEPAM UR CFM-MCNC: <25 NG/ML
NORFENTANYL UR CFM-MCNC: <2.5 NG/ML
NORHYDROCODONE UR CFM-MCNC: >1000 NG/ML
NOROXYCODONE UR CFM-MCNC: <25 NG/ML
NORTRAMADOL UR-MCNC: <50 NG/ML
NRBC BLD-RTO: 0 /100 WBCS (ref 0–0)
OXAZEPAM UR CFM-MCNC: <25 NG/ML
OXYCODONE UR CFM-MCNC: <25 NG/ML
OXYMORPHONE UR CFM-MCNC: <25 NG/ML
PLATELET # BLD AUTO: 392 X10*3/UL (ref 150–450)
POTASSIUM SERPL-SCNC: 3.9 MMOL/L (ref 3.5–5.3)
PROT SERPL-MCNC: 8 G/DL (ref 6.4–8.2)
RBC # BLD AUTO: 4.29 X10*6/UL (ref 4–5.2)
SODIUM SERPL-SCNC: 139 MMOL/L (ref 136–145)
TEMAZEPAM UR CFM-MCNC: <25 NG/ML
TRAMADOL UR CFM-MCNC: <50 NG/ML
TRIGL SERPL-MCNC: 111 MG/DL (ref 0–149)
TSH SERPL-ACNC: 1.03 MIU/L (ref 0.44–3.98)
VLDL: 22 MG/DL (ref 0–40)
WBC # BLD AUTO: 7.6 X10*3/UL (ref 4.4–11.3)
ZOLPIDEM UR CFM-MCNC: <25 NG/ML
ZOLPIDEM UR-MCNC: <25 NG/ML

## 2024-04-19 PROCEDURE — 36415 COLL VENOUS BLD VENIPUNCTURE: CPT

## 2024-04-19 PROCEDURE — 82607 VITAMIN B-12: CPT

## 2024-04-19 PROCEDURE — 82306 VITAMIN D 25 HYDROXY: CPT

## 2024-04-20 LAB
25(OH)D3 SERPL-MCNC: 41 NG/ML (ref 30–100)
GABAPENTIN UR-MCNC: >500 UG/ML
VIT B12 SERPL-MCNC: 460 PG/ML (ref 211–911)

## 2024-04-24 DIAGNOSIS — R73.9 HIGH BLOOD SUGAR: Primary | ICD-10-CM

## 2024-04-24 DIAGNOSIS — N18.9 CHRONIC KIDNEY DISEASE, UNSPECIFIED CKD STAGE: ICD-10-CM

## 2024-04-30 ENCOUNTER — HOSPITAL ENCOUNTER (OUTPATIENT)
Dept: RADIOLOGY | Facility: HOSPITAL | Age: 54
Discharge: HOME | End: 2024-04-30
Payer: COMMERCIAL

## 2024-04-30 ENCOUNTER — HOSPITAL ENCOUNTER (OUTPATIENT)
Dept: PAIN MEDICINE | Facility: HOSPITAL | Age: 54
Discharge: HOME | End: 2024-04-30
Payer: COMMERCIAL

## 2024-04-30 VITALS
SYSTOLIC BLOOD PRESSURE: 160 MMHG | HEART RATE: 94 BPM | HEIGHT: 71 IN | RESPIRATION RATE: 16 BRPM | TEMPERATURE: 97.9 F | WEIGHT: 230 LBS | DIASTOLIC BLOOD PRESSURE: 94 MMHG | BODY MASS INDEX: 32.2 KG/M2 | OXYGEN SATURATION: 99 %

## 2024-04-30 DIAGNOSIS — M48.062 LUMBAR STENOSIS WITH NEUROGENIC CLAUDICATION: ICD-10-CM

## 2024-04-30 DIAGNOSIS — M47.12 CERVICAL SPONDYLOSIS WITH MYELOPATHY: ICD-10-CM

## 2024-04-30 DIAGNOSIS — M54.17 LUMBOSACRAL RADICULOPATHY AT L1: Primary | ICD-10-CM

## 2024-04-30 DIAGNOSIS — M54.12 CERVICAL RADICULOPATHY: Primary | ICD-10-CM

## 2024-04-30 DIAGNOSIS — M54.16 LUMBAR RADICULOPATHY, CHRONIC: ICD-10-CM

## 2024-04-30 DIAGNOSIS — M96.1 POSTLAMINECTOMY SYNDROME OF LUMBAR REGION: ICD-10-CM

## 2024-04-30 DIAGNOSIS — M79.18 MYOFASCIAL PAIN: ICD-10-CM

## 2024-04-30 DIAGNOSIS — G56.22 IRRITATION OF LEFT ULNAR NERVE: ICD-10-CM

## 2024-04-30 DIAGNOSIS — R52 PAIN: ICD-10-CM

## 2024-04-30 DIAGNOSIS — M54.16 CHRONIC LUMBAR RADICULOPATHY: ICD-10-CM

## 2024-04-30 PROCEDURE — 64483 NJX AA&/STRD TFRM EPI L/S 1: CPT | Performed by: ANESTHESIOLOGY

## 2024-04-30 PROCEDURE — 7100000009 HC PHASE TWO TIME - INITIAL BASE CHARGE: Performed by: ANESTHESIOLOGY

## 2024-04-30 PROCEDURE — 7100000010 HC PHASE TWO TIME - EACH INCREMENTAL 1 MINUTE: Performed by: ANESTHESIOLOGY

## 2024-04-30 PROCEDURE — 64484 NJX AA&/STRD TFRM EPI L/S EA: CPT | Performed by: ANESTHESIOLOGY

## 2024-04-30 RX ORDER — HYDROCODONE BITARTRATE AND ACETAMINOPHEN 5; 325 MG/1; MG/1
1 TABLET ORAL 3 TIMES DAILY PRN
Qty: 90 TABLET | Refills: 0 | Status: SHIPPED | OUTPATIENT
Start: 2024-04-30 | End: 2024-05-29 | Stop reason: SDUPTHER

## 2024-04-30 RX ORDER — CYCLOBENZAPRINE HCL 10 MG
10 TABLET ORAL 3 TIMES DAILY PRN
Qty: 60 TABLET | Refills: 2 | Status: SHIPPED | OUTPATIENT
Start: 2024-04-30

## 2024-04-30 ASSESSMENT — PAIN - FUNCTIONAL ASSESSMENT
PAIN_FUNCTIONAL_ASSESSMENT: 0-10
PAIN_FUNCTIONAL_ASSESSMENT: 0-10

## 2024-04-30 ASSESSMENT — PAIN SCALES - GENERAL
PAINLEVEL_OUTOF10: 0 - NO PAIN
PAINLEVEL_OUTOF10: 5 - MODERATE PAIN

## 2024-04-30 NOTE — DISCHARGE INSTRUCTIONS
No heavy lifting or strenuous activity today.    Keep bandage on for 24 hours.  Keep injection site clean and dry, it may be sore for up to 48 hours.  For relief of pain and swelling, you may apply ice to the injection site area.  If pain persist you may apply moist heat.  Common side effects of steroids include insomnia, facial flushing, increased appetite, headaches, sweating, fluid retention. If you have diabetes your blood sugar may increase. Please monitor your diet and your blood sugar should return to normal in a few days. If your sugar becomes dangerously high, please contact your physician that manages your diabetes for further guidance.  Rare side effects include infection, bleeding, nerve damage. If you have fever, redness or swelling near site, severe pain, please go to the nearest emergency room. For other questions please call office at [phone]. Local anesthetics wear off in several hours and duration of relief vary from person to person.    Schedule your MRI cervical spine. [phone]

## 2024-04-30 NOTE — H&P
History Of Present Illness  Rebecca Saenz is a 53 y.o. female presenting for left L1-L2 transforaminal epidural steroid injection under local anesthetic.  She has a history of L2-S1 fusion.  She had recent trauma about 6 months ago after a fall off of a ladder and bulging disc found on MRI.  She has continued lumbar radiculopathy.  Continues to take Norco as needed.  Pill count is 11 today.  She has issues with tremor being evaluated by [facility] neurology.  She notes recently over the last 2 weeks she has developed neck pain that radiates down her left arm into her fingers with pins and needle sensations.  She feels weakness and that she has been dropping things more easily.  The pain is persistent.  She denies any injury.  She had a similar problem in the past which resolved with physical therapy.  It is aggravated by neck movements and flexing the arm above 90 degrees.  Her last MRI cervical spine was from 2022:     IMPRESSION:     Multilevel cervical spondylosis and mild spondylolisthesis result in   canal and foraminal stenoses as detailed.  No high-grade canal stenosis   or cord compression.  Multilevel high-grade foraminal stenoses as   described.     Normal signal and morphology of the cervical and visualized upper   thoracic cord.     Past Medical History  Past Medical History:   Diagnosis Date    Arthritis     Cataract 2018    Depression     Headache     Hyperlipidemia     Joint pain        Surgical History  Past Surgical History:   Procedure Laterality Date    BACK SURGERY  05/19/2018    Back Surgery    EYE SURGERY  05/19/2018    Eye Surgery    HYSTERECTOMY          Social History  She reports that she has quit smoking. Her smoking use included cigarettes. She has a 22.5 pack-year smoking history. She has never used smokeless tobacco. She reports that she does not drink alcohol and does not use drugs.    Family History  Family History   Problem Relation Name Age of Onset    No Known Problems  "Mother      Arthritis Father [place]     Cancer Father [place]         Allergies  Sulfamethoxazole    Review of Systems  Review of Systems  A complete review of systems was conducted, pertinent only to the HPI noted above.     Constitutional: None     Eyes: No additions to above history     Ears, Nose, Throat: No additions to above history     Cardiovascular: No additions to above history     Respiratory: No additions to above history     GI: No additions to above history     : No additions to above history     Skin/Neuro: No additions to above history     Endocrine/Heme/Lymph: No additions to above history     Immunologic: No additions to above history     Psychiatric: No additions to above history     Musculoskeletal: see above    Physical Exam  General: Well developed, awake/alert/oriented x3, no distress, alert and cooperative.    Skin: Warm and dry    Eyes: EOMI    Head/neck: No apparent injury    Cardiac: Regular rate and rhythm, no murmurs    Respiratory: Clear to auscultation bilaterally    GI: Nontender, nondistended    Extremities: No edema or deformity    MSK:   neck/Left upper extremity:  No bony tenderness cervical spinous processes.  Positive tenderness over the left paraspinal musculature.  Neck flexion, extension, rotation intact.  Strength 5/5 with shoulder abduction, elbow flexion, extension, wrist flexion and extension.  4/5 with finger abduction.  5/5  strength.  Sensation decreased slightly in the ulnar nerve distribution.  Positive Tinel of the ulnar nerve at cubital tunnel.  Positive Spurling's test.    Neuro: Resting tremor    Psych: Appropriate mood    Last Recorded Vitals  Blood pressure 140/84, pulse 101, temperature 36.8 °C (98.2 °F), temperature source Temporal, resp. rate 17, height 1.803 m (5' 11\"), weight 104 kg (230 lb), SpO2 96%.      Assessment/Plan   Left lumbar radiculopathy secondary to herniated disc  -Continue with planned TFESI L1-L2 under local  -Will refill Norco, OARRS " report checked and appropriate  Left cervical radiculopathy-new onset  -Will refill Flexeril  -Will order MRI to evaluate for any changes since prior study       I spent 25 minutes in the professional and overall care of this patient.      Kristofer Dockery PA-C

## 2024-04-30 NOTE — OP NOTE
Date: 2024  OR Location: GEN NON-OR PROCEDURES    Name: Rebecca Saenz, : 1970, Age: 53 y.o., MRN: [id number], Sex: female    Diagnosis   1. Lumbar stenosis with neurogenic claudication  Transforaminal    Transforaminal    HYDROcodone-acetaminophen (Norco) 5-325 mg tablet      2. Lumbar radiculopathy, chronic  Transforaminal    Transforaminal      3. Myofascial pain  cyclobenzaprine (Flexeril) 10 mg tablet      4. Chronic lumbar radiculopathy  HYDROcodone-acetaminophen (Norco) 5-325 mg tablet      5. Postlaminectomy syndrome of lumbar region  HYDROcodone-acetaminophen (Norco) 5-325 mg tablet        Preprocedure;  Patient's airway was reevaluated preoperatively prior to receiving IV conscious sedation.  No anatomical adverse airway conditions were noted.  A Mallampati score of 2 was assigned to the patient.  ASA : II      Patient has no changes in health medical management or allergies since last visit on 25    Preop diagnosis: Lumbosacral radiculopathy  Postop diagnosis: Same  Operation performed:  #1  Left L1-2, L2-3 transforaminal epidural steroid injection with fluoroscopic guidance.   #2 epidurography    Procedures   ANESTHESIA: Local  ESTIMATED BLOOD LOSS: None  COMPLICATIONS: None  PROCEDURE: A 22-gauge IV was started in the patient's left hand. The patient was taken to the operating room, placed in the prone position where sterile prep and drapes were done. Supplemental oxygen was given to the patient at 2 L per minute. Blood pressure and pulse ox remained stable throughout the case. Using fluoroscopic guidance, a single 25-gauge 3-1/2 inch spinal needle was inserted into the neural foramen at left 1-2 and left 2-3.  A cross table lateral identified the needle tip at the vertebral body. Aspiration was negative of cerebrospinal fluid or blood.  One mL of Omnipaque 300 mg contrast media was injected into each spinal needle. The left L1 and L2 nerve roots and epidural space was visualized in  both AP and each lateral views with the fluoroscope. One mL of 2% xylocaine MPF and 6 mg of Celestone was injected into each of the 2 needles. The needles were then removed and sterile bandage was applied along with Neosporin ointment to the puncture sites. The patient was taken to the recovery room with no neurologic deficits or pain noted. The patient is scheduled for a follow-up visit.    The patient tolerated the procedure very well and was transferred to the Recovery Room in stable condition.  The patient had stable resolution of symptoms.  Patient to follow-up in the Pain Management Clinic as scheduled.

## 2024-05-01 ASSESSMENT — PAIN SCALES - GENERAL: PAINLEVEL_OUTOF10: 3

## 2024-05-02 ENCOUNTER — OFFICE VISIT (OUTPATIENT)
Dept: NEPHROLOGY | Facility: CLINIC | Age: 54
End: 2024-05-02
Payer: COMMERCIAL

## 2024-05-02 VITALS
DIASTOLIC BLOOD PRESSURE: 83 MMHG | BODY MASS INDEX: 32.2 KG/M2 | WEIGHT: 230 LBS | HEIGHT: 71 IN | SYSTOLIC BLOOD PRESSURE: 137 MMHG | HEART RATE: 111 BPM

## 2024-05-02 DIAGNOSIS — N18.9 CHRONIC KIDNEY DISEASE, UNSPECIFIED CKD STAGE: ICD-10-CM

## 2024-05-02 DIAGNOSIS — R79.89 ELEVATED SERUM CREATININE: Primary | ICD-10-CM

## 2024-05-02 PROCEDURE — 99203 OFFICE O/P NEW LOW 30 MIN: CPT | Performed by: INTERNAL MEDICINE

## 2024-05-02 PROCEDURE — 1036F TOBACCO NON-USER: CPT | Performed by: INTERNAL MEDICINE

## 2024-05-02 NOTE — PROGRESS NOTES
Rebecca Saenz   53 y.o.      Vitals:    05/02/24 1134   Weight: 104 kg (230 lb)      MRN/Room: [id number]/Room/bed info not found      History Of Present Illness  Rebecca Saenz is a 53 y.o. female presenting with high creatinine level.     Past Medical History  She has a past medical history of Arthritis, Cataract (2018), Depression, Headache, Hyperlipidemia, and Joint pain.    Surgical History  She has a past surgical history that includes Back surgery (05/19/2018); Eye surgery (05/19/2018); and Hysterectomy.     Social History  She reports that she has quit smoking. Her smoking use included cigarettes. She has a 22.5 pack-year smoking history. She has never used smokeless tobacco. She reports that she does not drink alcohol and does not use drugs.    Family History  Family History   Problem Relation Name Age of Onset    No Known Problems Mother      Arthritis Father [place]     Cancer Father [place]         Allergies  Sulfamethoxazole    Meds:         Current Outpatient Medications   Medication Sig Dispense Refill    ascorbic acid (Vitamin C) 500 mg tablet Take 1 tablet (500 mg) by mouth once daily.      calcium carbonate (CALCIUM 500 ORAL) Take by mouth.      cholecalciferol (Vitamin D3) 25 MCG (1000 UT) tablet Take 1 tablet (25 mcg) by mouth once daily.      cyanocobalamin (Vitamin B-12) 1,000 mcg tablet Take 100 mcg by mouth once daily.      cyclobenzaprine (Flexeril) 10 mg tablet Take 1 tablet (10 mg) by mouth 3 times a day as needed for muscle spasms. 60 tablet 2    DULoxetine (Cymbalta) 60 mg DR capsule Take 1 capsule (60 mg) by mouth 2 times a day. Do not crush or chew. 180 capsule 0    gabapentin (Neurontin) 600 mg tablet Take 2 tablets (1,200 mg) by mouth 3 times a day. 180 tablet 0    garlic 500 mg capsule Take by mouth.      HYDROcodone-acetaminophen (Norco) 5-325 mg tablet Take 1 tablet by mouth 3 times a day as needed for severe pain (7 - 10). 90 tablet 0    methocarbamol (Robaxin) 750 mg tablet Take 1  tablet (750 mg) by mouth 3 times a day.      naloxone (Narcan) 4 mg/0.1 mL nasal spray Administer 1 spray (4 mg) into affected nostril(s) if needed for opioid reversal or respiratory depression. May repeat every 2-3 minutes if needed, alternating nostrils, until medical assistance becomes available. 2 each 0    rOPINIRole (Requip) 1 mg tablet Take 1 tablet (1 mg) by mouth once daily at bedtime. 90 tablet 1     No current facility-administered medications for this visit.         ROS:  The patient is awake and oriented. No dizziness or lightheadedness. No chills and no fever. No headaches. No nausea and no vomiting. No shortness of breath. No cough. No sputum. No chest pain. No chest tightness. No abdominal pain. No diarrhea and no constipation. No hematemesis or hemoptysis. No hematuria. No rectal bleeding. No melena. No epistaxis. No urinary symptoms. No flank pain. No leg edema. No leg pain. No weakness. No itching. Overall, the rest of the review of systems is also negative.  12 point review of systems otherwise negative as stated in HPI.        Physical Exam:        Vitals:    05/02/24 1149   BP: 137/83   Pulse:      General: The patient is awake, oriented, and is not in any distress.  Head and Neck: Normocephalic. No periorbital edema.  Respiratory: Symmetric air entry. Symmetric chest expansion.No respiratory distress.  Cardiovascular: Symmetric peripheral pulses.  Skin: No maculopapular rash.  Musculoskeletal: No peripheral edema in both left and right upper extremities.  No edema in either left or right lower extremities.  Neuro Exam: Speech is fluent. Moves extremities.        Blood Labs:  No results found for this or any previous visit (from the past 24 hour(s)).   Lab Results   Component Value Date    GLUCOSE 146 (H) 04/19/2024    CALCIUM 10.0 04/19/2024     04/19/2024    K 3.9 04/19/2024    CO2 27 04/19/2024     04/19/2024    BUN 20 04/19/2024    CREATININE 1.19 (H) 04/19/2024        Imaging:        Assessment and Plan:  #1 elevated creatinine level.  Probably chronic kidney disease stage II-III.  No history of diabetes.  No history of hypertension.  No urinary signs or symptoms.  I asked for a kidney ultrasound.  PTH, phosphorus, 25-hydroxy vitamin D, microscopic urinalysis, and spot urine protein to creatinine ratio will be checked.    I will see her in about 2 to 3 weeks for follow-up.      Orville Peoples MD  Senior Attending Physician  Director of Onco-Nephrology Program  Division of Nephrology & Hypertension  [facility]

## 2024-05-06 ENCOUNTER — HOSPITAL ENCOUNTER (OUTPATIENT)
Dept: RADIOLOGY | Facility: HOSPITAL | Age: 54
Discharge: HOME | End: 2024-05-06
Payer: COMMERCIAL

## 2024-05-06 ENCOUNTER — LAB (OUTPATIENT)
Dept: LAB | Facility: LAB | Age: 54
End: 2024-05-06
Payer: COMMERCIAL

## 2024-05-06 DIAGNOSIS — M96.1 POSTLAMINECTOMY SYNDROME OF LUMBAR REGION: ICD-10-CM

## 2024-05-06 DIAGNOSIS — M48.062 LUMBAR STENOSIS WITH NEUROGENIC CLAUDICATION: ICD-10-CM

## 2024-05-06 DIAGNOSIS — R79.89 ELEVATED SERUM CREATININE: ICD-10-CM

## 2024-05-06 DIAGNOSIS — N18.9 CHRONIC KIDNEY DISEASE, UNSPECIFIED CKD STAGE: ICD-10-CM

## 2024-05-06 DIAGNOSIS — H93.13 TINNITUS OF BOTH EARS: ICD-10-CM

## 2024-05-06 DIAGNOSIS — F32.9 CHRONIC MAJOR DEPRESSIVE DISORDER: ICD-10-CM

## 2024-05-06 DIAGNOSIS — M47.817 FACET ARTHROPATHY, LUMBOSACRAL: ICD-10-CM

## 2024-05-06 DIAGNOSIS — M54.16 CHRONIC LUMBAR RADICULOPATHY: ICD-10-CM

## 2024-05-06 LAB
25(OH)D3 SERPL-MCNC: 37 NG/ML (ref 30–100)
ALBUMIN SERPL BCP-MCNC: 4.4 G/DL (ref 3.4–5)
ALP SERPL-CCNC: 85 U/L (ref 33–110)
ALT SERPL W P-5'-P-CCNC: 14 U/L (ref 7–45)
ANION GAP SERPL CALC-SCNC: 12 MMOL/L (ref 10–20)
AST SERPL W P-5'-P-CCNC: 14 U/L (ref 9–39)
BILIRUB SERPL-MCNC: 0.4 MG/DL (ref 0–1.2)
BUN SERPL-MCNC: 13 MG/DL (ref 6–23)
CALCIUM SERPL-MCNC: 9.7 MG/DL (ref 8.6–10.3)
CHLORIDE SERPL-SCNC: 101 MMOL/L (ref 98–107)
CHOLEST SERPL-MCNC: 192 MG/DL (ref 0–199)
CHOLESTEROL/HDL RATIO: 4
CO2 SERPL-SCNC: 31 MMOL/L (ref 21–32)
CREAT SERPL-MCNC: 1.06 MG/DL (ref 0.5–1.05)
CREAT UR-MCNC: 87.6 MG/DL (ref 20–320)
EGFRCR SERPLBLD CKD-EPI 2021: 62 ML/MIN/1.73M*2
ERYTHROCYTE [DISTWIDTH] IN BLOOD BY AUTOMATED COUNT: 13.8 % (ref 11.5–14.5)
GLUCOSE SERPL-MCNC: 99 MG/DL (ref 74–99)
HCT VFR BLD AUTO: 40.7 % (ref 36–46)
HDLC SERPL-MCNC: 48 MG/DL
HGB BLD-MCNC: 12.8 G/DL (ref 12–16)
LDLC SERPL CALC-MCNC: 114 MG/DL
MCH RBC QN AUTO: 30 PG (ref 26–34)
MCHC RBC AUTO-ENTMCNC: 31.4 G/DL (ref 32–36)
MCV RBC AUTO: 96 FL (ref 80–100)
NON HDL CHOLESTEROL: 144 MG/DL (ref 0–149)
NRBC BLD-RTO: 0 /100 WBCS (ref 0–0)
PLATELET # BLD AUTO: 422 X10*3/UL (ref 150–450)
POTASSIUM SERPL-SCNC: 4.8 MMOL/L (ref 3.5–5.3)
PROT SERPL-MCNC: 7.1 G/DL (ref 6.4–8.2)
PROT UR-ACNC: 6 MG/DL (ref 5–24)
PROT/CREAT UR: 0.07 MG/MG CREAT (ref 0–0.17)
PTH-INTACT SERPL-MCNC: 28.3 PG/ML (ref 18.5–88)
RBC # BLD AUTO: 4.26 X10*6/UL (ref 4–5.2)
SODIUM SERPL-SCNC: 139 MMOL/L (ref 136–145)
TRIGL SERPL-MCNC: 148 MG/DL (ref 0–149)
VLDL: 30 MG/DL (ref 0–40)
WBC # BLD AUTO: 7 X10*3/UL (ref 4.4–11.3)

## 2024-05-06 PROCEDURE — 85027 COMPLETE CBC AUTOMATED: CPT

## 2024-05-06 PROCEDURE — 80061 LIPID PANEL: CPT

## 2024-05-06 PROCEDURE — 76770 US EXAM ABDO BACK WALL COMP: CPT

## 2024-05-06 PROCEDURE — 82306 VITAMIN D 25 HYDROXY: CPT

## 2024-05-06 PROCEDURE — 36415 COLL VENOUS BLD VENIPUNCTURE: CPT

## 2024-05-06 PROCEDURE — 80053 COMPREHEN METABOLIC PANEL: CPT

## 2024-05-06 PROCEDURE — 82570 ASSAY OF URINE CREATININE: CPT

## 2024-05-06 PROCEDURE — 83970 ASSAY OF PARATHORMONE: CPT

## 2024-05-06 PROCEDURE — 84156 ASSAY OF PROTEIN URINE: CPT

## 2024-05-06 PROCEDURE — 76770 US EXAM ABDO BACK WALL COMP: CPT | Performed by: STUDENT IN AN ORGANIZED HEALTH CARE EDUCATION/TRAINING PROGRAM

## 2024-05-08 ENCOUNTER — OFFICE VISIT (OUTPATIENT)
Dept: PODIATRY | Facility: CLINIC | Age: 54
End: 2024-05-08
Payer: COMMERCIAL

## 2024-05-08 DIAGNOSIS — M19.079 1ST MTP ARTHRITIS: ICD-10-CM

## 2024-05-08 DIAGNOSIS — M79.671 RIGHT FOOT PAIN: Primary | ICD-10-CM

## 2024-05-08 PROCEDURE — 1036F TOBACCO NON-USER: CPT | Performed by: PODIATRIST

## 2024-05-08 PROCEDURE — 99203 OFFICE O/P NEW LOW 30 MIN: CPT | Performed by: PODIATRIST

## 2024-05-08 NOTE — PROGRESS NOTES
This is a 53 y.o. female new patient for foot pain    History of Present Illness:   Patient states they are here for foot pain  Pain to right big toe joint  States it is tender in shoes  Denies trauma  Denies Dm  No other pedal complaints    Past Medical History  Past Medical History:   Diagnosis Date    Arthritis     Cataract 2018    Depression     Headache     Hyperlipidemia     Joint pain        Medications and Allergies have been reviewed.    Review Of Systems:  GENERAL: No weight loss, malaise or fevers.  HEENT: Negative for frequent or significant headaches,   RESPIRATORY: Negative for cough, wheezing or shortness of breath.  CARDIOVASCULAR: Negative for chest pain, leg swelling or palpitations.    Physical Exam:  Patient is a pleasant, cooperative, well developed 53 y.o.  adult female. The patient is alert and oriented to time, place and person.   Patient has normal affect and mood.    Examination of Both Lower Extremities:   Objective:   Vasc: DP and PT pulses are palpable bilateral.  CFT is less than 3 seconds bilateral.  Skin temperature is warm to cool proximal to distal bilateral.      Neuro: Vibratory, light touch and proprioception are intact bilateral.      Derm: Nails 1-5 bilateral are intact.  Skin is supple with normal texture and turgor noted.  Webspaces are clean, dry and intact bilateral.  There are no hyperkeratoses, ulcerations, verruca or other lesions noted.      Ortho: Muscle strength is 5/5 for all pedal groups tested.  Pain to right 1st MTPJ. Dorsal spurring noted. Mild erythema noted. Decreased ROM noted.     1. 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 All questions answered.      Shani Fonscea DPM  [phone]  [address]  Fax:  [phone]

## 2024-05-10 ENCOUNTER — OFFICE VISIT (OUTPATIENT)
Dept: OTOLARYNGOLOGY | Facility: CLINIC | Age: 54
End: 2024-05-10
Payer: COMMERCIAL

## 2024-05-10 DIAGNOSIS — H93.13 TINNITUS OF BOTH EARS: Primary | ICD-10-CM

## 2024-05-10 PROCEDURE — 99203 OFFICE O/P NEW LOW 30 MIN: CPT | Performed by: OTOLARYNGOLOGY

## 2024-05-10 PROCEDURE — 1036F TOBACCO NON-USER: CPT | Performed by: OTOLARYNGOLOGY

## 2024-05-10 NOTE — PROGRESS NOTES
"History Of Present Illness  05.10.2024: Rebecca Saenz is a 53 y.o. female presenting with: \"White noise/static in ears\".  She has bilateral tinnitus in her ears for the past 6 months. Started after a trauma (fell from ladder). She has back pain, some neck tenderness. Ears look normal on exam    Ddx:  1- cervicogenic tinnitus    Plan:  1- hearing test  2- follow up after MRI cervical     Past Medical History  She has a past medical history of Arthritis, Cataract (2018), Depression, Headache, Hyperlipidemia, and Joint pain.    Surgical History  She has a past surgical history that includes Back surgery (05/19/2018); Eye surgery (05/19/2018); and Hysterectomy.     Social History  She reports that she has quit smoking. Her smoking use included cigarettes. She has a 22.5 pack-year smoking history. She has never used smokeless tobacco. She reports that she does not drink alcohol and does not use drugs.    Family History  Family History   Problem Relation Name Age of Onset    No Known Problems Mother      Arthritis Father [place]     Cancer Father [place]         Allergies  Sulfamethoxazole    Review of Systems   Static/white noise in ears     Physical Exam    General appearance: Healthy-appearing, well-nourished, well groomed, in no acute distress.     Head and Face: Atraumatic with no masses, lesions, or scarring.      Salivary glands: No tenderness of the parotid glands or parotid masses.     No tenderness of the submandibular glands or submandibular masses.      Facial strength: Normal strength and symmetry, no synkinesis or facial tic.     Eyes: Conjunctivas look non-hyperemic bilaterally    Ears: Bilaterally ear canals look normal. Tympanic membranes look intact, no hyperemia, fluid or retraction. Hearing grossly normal.      Nose: Mucosa looks normal. No purulent discharge.     Oral Cavity/Mouth: Lips and tongue look normal.     Throat: No postnasal discharge. No tonsil hypertrophy. No hyperemia.    Neck: Symmetrical, " "trachea midline.     Pulmonary: Normal respiratory effort.     Lymphatic: No palpable pathologic lymph nodes at neck.     Neurological/Psychiatric Orientation to person, place, and time: Normal.     Mood and affect: Normal.      Extremities: No clubbing.     Skin: No significant skin lesions were noted at face or neck        Procedure         Last Recorded Vitals  There were no vitals taken for this visit.    Relevant Results    Assessment and Plan:  05.10.2024:  Rebecca Saenz is a 53 y.o. female presenting with: \"White noise/static in ears\".  She has bilateral tinnitus in her ears for the past 6 months. Started after a trauma (fell from ladder). She has back pain, some neck tenderness. Ears look normal on exam    Ddx:  1- cervicogenic tinnitus    Plan:  1- hearing test  2- follow up after MRI cervical      Anju Dye  Otolaryngology - Head & Neck Surgery  "

## 2024-05-13 DIAGNOSIS — R79.89 ELEVATED SERUM CREATININE: Primary | ICD-10-CM

## 2024-05-13 RX ORDER — CIPROFLOXACIN 500 MG/1
500 TABLET ORAL 2 TIMES DAILY
Qty: 6 TABLET | Refills: 0 | Status: SHIPPED | OUTPATIENT
Start: 2024-05-13 | End: 2024-05-16

## 2024-05-15 ENCOUNTER — HOSPITAL ENCOUNTER (OUTPATIENT)
Dept: RADIOLOGY | Facility: HOSPITAL | Age: 54
Discharge: HOME | End: 2024-05-15
Payer: COMMERCIAL

## 2024-05-15 DIAGNOSIS — M54.12 CERVICAL RADICULOPATHY: ICD-10-CM

## 2024-05-15 DIAGNOSIS — G56.22 IRRITATION OF LEFT ULNAR NERVE: ICD-10-CM

## 2024-05-15 DIAGNOSIS — M47.12 CERVICAL SPONDYLOSIS WITH MYELOPATHY: ICD-10-CM

## 2024-05-15 PROCEDURE — 72141 MRI NECK SPINE W/O DYE: CPT

## 2024-05-15 PROCEDURE — 72141 MRI NECK SPINE W/O DYE: CPT | Performed by: RADIOLOGY

## 2024-05-21 DIAGNOSIS — M54.16 CHRONIC LUMBAR RADICULOPATHY: ICD-10-CM

## 2024-05-21 DIAGNOSIS — M96.1 POSTLAMINECTOMY SYNDROME OF LUMBAR REGION: ICD-10-CM

## 2024-05-21 DIAGNOSIS — M48.062 LUMBAR STENOSIS WITH NEUROGENIC CLAUDICATION: ICD-10-CM

## 2024-05-21 DIAGNOSIS — M47.817 FACET ARTHROPATHY, LUMBOSACRAL: ICD-10-CM

## 2024-05-21 RX ORDER — GABAPENTIN 600 MG/1
1200 TABLET ORAL 3 TIMES DAILY
Qty: 180 TABLET | Refills: 0 | Status: SHIPPED | OUTPATIENT
Start: 2024-05-21

## 2024-05-29 ENCOUNTER — OFFICE VISIT (OUTPATIENT)
Dept: NEPHROLOGY | Facility: CLINIC | Age: 54
End: 2024-05-29
Payer: COMMERCIAL

## 2024-05-29 ENCOUNTER — TELEPHONE (OUTPATIENT)
Dept: OPERATING ROOM | Facility: HOSPITAL | Age: 54
End: 2024-05-29

## 2024-05-29 VITALS
SYSTOLIC BLOOD PRESSURE: 119 MMHG | HEART RATE: 102 BPM | DIASTOLIC BLOOD PRESSURE: 83 MMHG | HEIGHT: 71 IN | WEIGHT: 231 LBS | BODY MASS INDEX: 32.34 KG/M2

## 2024-05-29 DIAGNOSIS — M48.062 LUMBAR STENOSIS WITH NEUROGENIC CLAUDICATION: ICD-10-CM

## 2024-05-29 DIAGNOSIS — M54.16 CHRONIC LUMBAR RADICULOPATHY: ICD-10-CM

## 2024-05-29 DIAGNOSIS — N18.2 CKD (CHRONIC KIDNEY DISEASE) STAGE 2, GFR 60-89 ML/MIN: Primary | ICD-10-CM

## 2024-05-29 DIAGNOSIS — M96.1 POSTLAMINECTOMY SYNDROME OF LUMBAR REGION: ICD-10-CM

## 2024-05-29 PROCEDURE — 1036F TOBACCO NON-USER: CPT | Performed by: INTERNAL MEDICINE

## 2024-05-29 PROCEDURE — 99213 OFFICE O/P EST LOW 20 MIN: CPT | Performed by: INTERNAL MEDICINE

## 2024-05-29 RX ORDER — VENLAFAXINE HYDROCHLORIDE 37.5 MG/1
37.5 CAPSULE, EXTENDED RELEASE ORAL
COMMUNITY
Start: 2024-04-25

## 2024-05-29 RX ORDER — HYDROCODONE BITARTRATE AND ACETAMINOPHEN 5; 325 MG/1; MG/1
1 TABLET ORAL 3 TIMES DAILY PRN
Qty: 90 TABLET | Refills: 0 | Status: SHIPPED | OUTPATIENT
Start: 2024-05-31

## 2024-05-29 NOTE — PROGRESS NOTES
Rebecca Saenz   53 y.o.    @@  [facility]/Room: [id number]/Room/bed info not found    Subjective:   The patient is being seen for a routine clinic follow-up of chronic kidney disease. Recently, the disease has been stable. Disease complications:  No hyperkalemia, no hypocalcemia, no hyperphosphatemia, no metabolic acidosis, no coagulopathy, no uremic encephalopathy, no neuropathy and no renal osteodystrophy. The patient is currently asymptomatic. No associated symptoms are reported.       Meds:   Current Outpatient Medications   Medication Sig Dispense Refill    ascorbic acid (Vitamin C) 500 mg tablet Take 1 tablet (500 mg) by mouth once daily.      calcium carbonate (CALCIUM 500 ORAL) Take by mouth.      cholecalciferol (Vitamin D3) 25 MCG (1000 UT) tablet Take 1 tablet (25 mcg) by mouth once daily.      cyanocobalamin (Vitamin B-12) 1,000 mcg tablet Take 100 mcg by mouth once daily.      cyclobenzaprine (Flexeril) 10 mg tablet Take 1 tablet (10 mg) by mouth 3 times a day as needed for muscle spasms. 60 tablet 2    DULoxetine (Cymbalta) 60 mg DR capsule Take 1 capsule (60 mg) by mouth 2 times a day. Do not crush or chew. 180 capsule 0    gabapentin (Neurontin) 600 mg tablet Take 2 tablets (1,200 mg) by mouth 3 times a day. 180 tablet 0    garlic 500 mg capsule Take by mouth.      HYDROcodone-acetaminophen (Norco) 5-325 mg tablet Take 1 tablet by mouth 3 times a day as needed for severe pain (7 - 10). 90 tablet 0    methocarbamol (Robaxin) 750 mg tablet Take 1 tablet (750 mg) by mouth 3 times a day.      naloxone (Narcan) 4 mg/0.1 mL nasal spray Administer 1 spray (4 mg) into affected nostril(s) if needed for opioid reversal or respiratory depression. May repeat every 2-3 minutes if needed, alternating nostrils, until medical assistance becomes available. 2 each 0    rOPINIRole (Requip) 1 mg tablet Take 1 tablet (1 mg) by mouth once daily at bedtime. 90 tablet 1    venlafaxine XR (Effexor-XR) 37.5 mg 24 hr capsule 1  capsule (37.5 mg).       No current facility-administered medications for this visit.          ROS:  The patient is awake and oriented. No dizziness or lightheadedness. No chills and no fever. No headaches. No nausea and no vomiting. No shortness of breath. No cough. No sputum. No chest pain. No chest tightness. No abdominal pain. No diarrhea and no constipation. No hematemesis or hemoptysis. No hematuria. No rectal bleeding. No melena. No epistaxis. No urinary symptoms. No flank pain. No leg edema. No leg pain. No weakness. No itching. Overall, the rest of the review of systems is also negative.  12 point review of systems otherwise negative as stated in HPI.        Physical Examination:        Vitals:    05/29/24 1148   BP: 119/83   Pulse: 102     General: The patient is awake, oriented, and is not in any distress.  Head and Neck: Normocephalic. No periorbital edema.  Eyes: non-icteric  Respiratory: Symmetric air entry. Symmetric chest expansion.No respiratory distress.  Cardiovascular: Symmetric peripheral pulses.  Skin: No maculopapular rash.  Abdomen: soft, nt/nd  Musculoskeletal: No peripheral edema in both left and right upper extremities.  No edema in either left or right lower extremities.  Neuro Exam: Speech is fluent. Moves extremities.    Imaging:  === 05/06/24 ===    US RENAL COMPLETE    - Impression -  No renal calculi or hydronephrosis.  16 mm left upper pole simple cyst.    Signed by: Dank Pelaez 5/7/2024 7:33 PM  Dictation workstation:   [id number]       Blood Labs:  No results found for this or any previous visit (from the past 24 hour(s)).   Lab Results   Component Value Date    PTH 28.3 05/06/2024    PROTUR 30 (1+) (N) 11/28/2023      Lab Results   Component Value Date    GLUCOSE 99 05/06/2024    CALCIUM 9.7 05/06/2024     05/06/2024    K 4.8 05/06/2024    CO2 31 05/06/2024     05/06/2024    BUN 13 05/06/2024    CREATININE 1.06 (H) 05/06/2024         Assessment and Plan:  #1  Chronic kidney disease stage II-III.  Last creatinine was 1.06.  No proteinuria.  Kidney ultrasound was unremarkable.  Normal potassium and bicarb level.  Blood pressure is good.  Normal PTH and vitamin D level.    I will see her in about 6-month for follow-up.          Orville Peoples MD  Senior Attending Physician  Director of Onco-Nephrology Program  Division of Nephrology & Hypertension  [facility]

## 2024-06-11 ENCOUNTER — OFFICE VISIT (OUTPATIENT)
Dept: PAIN MEDICINE | Facility: HOSPITAL | Age: 54
End: 2024-06-11
Payer: COMMERCIAL

## 2024-06-11 VITALS
SYSTOLIC BLOOD PRESSURE: 132 MMHG | DIASTOLIC BLOOD PRESSURE: 78 MMHG | TEMPERATURE: 97.9 F | WEIGHT: 230 LBS | BODY MASS INDEX: 32.2 KG/M2 | RESPIRATION RATE: 17 BRPM | HEIGHT: 71 IN | HEART RATE: 110 BPM | OXYGEN SATURATION: 96 %

## 2024-06-11 DIAGNOSIS — M48.062 LUMBAR STENOSIS WITH NEUROGENIC CLAUDICATION: ICD-10-CM

## 2024-06-11 DIAGNOSIS — M54.12 CERVICAL RADICULOPATHY AT C6: Primary | ICD-10-CM

## 2024-06-11 PROCEDURE — 99213 OFFICE O/P EST LOW 20 MIN: CPT | Performed by: ANESTHESIOLOGY

## 2024-06-11 PROCEDURE — 1036F TOBACCO NON-USER: CPT | Performed by: ANESTHESIOLOGY

## 2024-06-11 ASSESSMENT — PAIN SCALES - GENERAL: PAINLEVEL: 5

## 2024-06-11 NOTE — PROGRESS NOTES
Patient is a 53 year old female who presents today for her post procedure follow up visit.  Patient had a Left  L2-L3 TFESI on 4/30/2024.  Patient states that shew received 50% relief with the injection and that the relief lasted approximately 4 weeks.  Patients chief complaint today is pain she rates 5/10 to her upper back that radiates to her neck and left arm.  Patient describes the pain as cramping, sharp, shooting, spasms and throbbing.  Patient states she experiences decreased sensation, weakness and the feelings of pins and needles in her left arm.  Patient is currently taking Norco for her pain and presented with her bottle containing 86 pills.  Patient is currently able to perform her activities of daily living independently but with some difficulty.        Date of the last Controlled Substance Agreement: 12/11/2023       Last urine drug screening date/ordered today: 04/16/2024     Results of last screen:       Last opioid risk screening date/ordered today: 12/11/2023      Pain Scale Screening:   Pain Assessment and Documentation Tool (PADT)   Date of Assessment: 06/11/2024  Analgesia:   Patient reports her pain level on average during the past week is 5 on a 0 - 10 scale.   Patient reports that her pain level at its worst during the past week was 7 on a 0 -10 scale.   60% of pain has been relieved during the past week per patient   Patient states that the amount of pain relief she is now obtaining from her current pain reliever(s) is enough to make a real difference in her life.   Query to clinician: Is the patient's pain relief clinically significant?   Activities of Daily Living:   Physical functioning: unchanged  Family relationships: unchanged  Social relationships: unchanged  Mood: worse  Sleep patterns: worse  Overall functioning: unchanged  Adverse Events: NoRebecca is not experiencing side effects from current pain reliever.  Patients overall severity of side effect:none  Specific  Analgesic Plan: Continue present regimen.

## 2024-06-11 NOTE — H&P
History Of Present Illness  Rebecca Saenz is a 53 y.o. female presenting with cervical radiculopathy.  Patient had MRI done on 5/15/2024 which shows a bulging disc with indentation of the thecal sac without severe spinal stenosis.  She also has moderate to severe left and mild right neuroforaminal stenosis along with facet arthrosis at the C7-T1 level.  Patient's symptoms and signs are associated with her left arm with weakness and numbness and tingling traveling into her forearm and hand.  Risk and benefits of epidural steroid injection was discussed with her and be scheduled in 2 weeks under local anesthesia.     Past Medical History  Past Medical History:   Diagnosis Date    Arthritis     Cataract 2018    Depression     Headache     Hyperlipidemia     Joint pain        Surgical History  Past Surgical History:   Procedure Laterality Date    BACK SURGERY  05/19/2018    Back Surgery    EYE SURGERY  05/19/2018    Eye Surgery    HYSTERECTOMY          Social History  She reports that she has quit smoking. Her smoking use included cigarettes. She has a 22.5 pack-year smoking history. She has never used smokeless tobacco. She reports that she does not drink alcohol and does not use drugs.    Family History  Family History   Problem Relation Name Age of Onset    No Known Problems Mother      Arthritis Father Damaris     Cancer Father Milnor         Allergies  Sulfamethoxazole    Review of Systems  Chief complaint of neuroforaminal stenosis and cervical radiculopathy on the left at the 6-7  Physical Exam  Gen. appearance:  Patient's alert and oriented ×3 ;cooperative mild to moderate distress  Heart: Regular rate and rhythm  Lungs: Clear to auscultation  Abdomen: Soft nontender  Neuro: Cranial nerves II -XII intact; DTR: +2 over 4 biceps triceps brachial radialis patellar and Achilles  Spinal exam: Positive paraspinal tenderness noted bilaterally L4 5 L5-S1 with flexion extension and rotation/no bony  "abnormalities  Musculoskeletal:  Upper and lower extremity strength was 4/5 bilaterally  :  deferred  Skin: Warm and dry      Last Recorded Vitals  Blood pressure 132/78, pulse 110, temperature 36.6 °C (97.9 °F), resp. rate 17, height 1.803 m (5' 11\"), weight 104 kg (230 lb), SpO2 96%.    Relevant Results         Assessment/Plan   There are no diagnoses linked to this encounter.    Risk and benefits of cervical epidural steroid injection was discussed with the patient at length.  Patient scheduled for June 25, 2024 under local anesthesia.  That she was not drug-seeking and has a recent refill of her pain meds which she is taking Norco 5/325 every 8 hours as needed and gabapentin 1200 mg capsules 3 times a day.  All questions were answered prior to discharge.       I spent 20 minutes in the professional and overall care of this patient.      Malick Ching, DO    "

## 2024-06-11 NOTE — H&P (VIEW-ONLY)
History Of Present Illness  Rebecca Saenz is a 53 y.o. female presenting with cervical radiculopathy.  Patient had MRI done on 5/15/2024 which shows a bulging disc with indentation of the thecal sac without severe spinal stenosis.  She also has moderate to severe left and mild right neuroforaminal stenosis along with facet arthrosis at the C7-T1 level.  Patient's symptoms and signs are associated with her left arm with weakness and numbness and tingling traveling into her forearm and hand.  Risk and benefits of epidural steroid injection was discussed with her and be scheduled in 2 weeks under local anesthesia.     Past Medical History  Past Medical History:   Diagnosis Date    Arthritis     Cataract 2018    Depression     Headache     Hyperlipidemia     Joint pain        Surgical History  Past Surgical History:   Procedure Laterality Date    BACK SURGERY  05/19/2018    Back Surgery    EYE SURGERY  05/19/2018    Eye Surgery    HYSTERECTOMY          Social History  She reports that she has quit smoking. Her smoking use included cigarettes. She has a 22.5 pack-year smoking history. She has never used smokeless tobacco. She reports that she does not drink alcohol and does not use drugs.    Family History  Family History   Problem Relation Name Age of Onset    No Known Problems Mother      Arthritis Father Damaris     Cancer Father [place]         Allergies  Sulfamethoxazole    Review of Systems  Chief complaint of neuroforaminal stenosis and cervical radiculopathy on the left at the 6-7  Physical Exam  Gen. appearance:  Patient's alert and oriented ×3 ;cooperative mild to moderate distress  Heart: Regular rate and rhythm  Lungs: Clear to auscultation  Abdomen: Soft nontender  Neuro: Cranial nerves II -XII intact; DTR: +2 over 4 biceps triceps brachial radialis patellar and Achilles  Spinal exam: Positive paraspinal tenderness noted bilaterally L4 5 L5-S1 with flexion extension and rotation/no bony  "abnormalities  Musculoskeletal:  Upper and lower extremity strength was 4/5 bilaterally  :  deferred  Skin: Warm and dry      Last Recorded Vitals  Blood pressure 132/78, pulse 110, temperature 36.6 °C (97.9 °F), resp. rate 17, height 1.803 m (5' 11\"), weight 104 kg (230 lb), SpO2 96%.    Relevant Results         Assessment/Plan   There are no diagnoses linked to this encounter.    Risk and benefits of cervical epidural steroid injection was discussed with the patient at length.  Patient scheduled for June 25, 2024 under local anesthesia.  That she was not drug-seeking and has a recent refill of her pain meds which she is taking Norco 5/325 every 8 hours as needed and gabapentin 1200 mg capsules 3 times a day.  All questions were answered prior to discharge.       I spent 20 minutes in the professional and overall care of this patient.      Malick Ching, DO    "

## 2024-06-20 ENCOUNTER — APPOINTMENT (OUTPATIENT)
Dept: NEUROSURGERY | Facility: CLINIC | Age: 54
End: 2024-06-20
Payer: COMMERCIAL

## 2024-06-20 DIAGNOSIS — M54.12 CERVICAL RADICULOPATHY AT C6: Primary | ICD-10-CM

## 2024-06-24 ENCOUNTER — APPOINTMENT (OUTPATIENT)
Dept: AUDIOLOGY | Facility: CLINIC | Age: 54
End: 2024-06-24
Payer: COMMERCIAL

## 2024-06-24 ENCOUNTER — APPOINTMENT (OUTPATIENT)
Dept: OTOLARYNGOLOGY | Facility: CLINIC | Age: 54
End: 2024-06-24
Payer: COMMERCIAL

## 2024-06-24 DIAGNOSIS — H93.13 SUBJECTIVE TINNITUS, BILATERAL: Primary | ICD-10-CM

## 2024-06-24 DIAGNOSIS — H93.13 TINNITUS OF BOTH EARS: Primary | ICD-10-CM

## 2024-06-24 DIAGNOSIS — H90.0 CONDUCTIVE HEARING LOSS OF BOTH EARS: ICD-10-CM

## 2024-06-24 PROCEDURE — 99213 OFFICE O/P EST LOW 20 MIN: CPT | Performed by: OTOLARYNGOLOGY

## 2024-06-24 PROCEDURE — 92550 TYMPANOMETRY & REFLEX THRESH: CPT | Performed by: AUDIOLOGIST

## 2024-06-24 PROCEDURE — 92557 COMPREHENSIVE HEARING TEST: CPT | Performed by: AUDIOLOGIST

## 2024-06-24 PROCEDURE — 1036F TOBACCO NON-USER: CPT | Performed by: OTOLARYNGOLOGY

## 2024-06-24 ASSESSMENT — PAIN SCALES - GENERAL: PAINLEVEL_OUTOF10: 0 - NO PAIN

## 2024-06-24 ASSESSMENT — PAIN - FUNCTIONAL ASSESSMENT: PAIN_FUNCTIONAL_ASSESSMENT: 0-10

## 2024-06-24 NOTE — PROGRESS NOTES
"Rebecca Saenz, age 53 years, is here today for a hearing evaluation. She reports her right ear feels plugged today.    Difficulty hearing - no  Tinnitus - yes, both ears since November 2024 following a fall from a ladder  Excessive noise exposure - yes,    Chronic ear infections - no  Ear pain - no  Ear drainage - no  Past ear surgery - no  Vertigo - no  Dizziness - no  Past hearing aid use - no  Family history - no    Appointment time: 9:05-9:50    Otoscopy revealed clear ear canals with visual inspection of the tympanic membranes bilaterally.    Behavioral hearing evaluation:  Right ear - normal hearing sensitivity to mild conductive hearing loss 125-8000 Hz  Left ear - normal hearing sensitivity to mild conductive hearing loss 125-8000 Hz    Speech reception thresholds obtained at a level consistent with pure tone thresholds bilaterally.    Word discrimination:  Right ear - excellent (100%)  Left ear - excellent (100%)    Tympanometry:  Right ear - Type A, normal middle ear function  Left ear - Type A, normal middle ear function    Ipsilateral acoustic reflexes:  Probe right - present 500-4000 Hz  Probe left - present 500-4000 Hz    Contralateral acoustic reflexes:  Probe right - present 500-4000 Hz  Probe left - present 500-4000 Hz    Recommendations:  1) Follow up with Dr Dye regarding a \"plugged\" feeling in her right ear and bilateral tinnitus following a fall  2) Re-evaluate hearing annually, to monitor, or sooner if a change in hearing is noted        "

## 2024-06-24 NOTE — PROGRESS NOTES
"History Of Present Illness    06.24.2024: She had her hearing test today. She has mild hearing loss at high frequencies. MRI shows advanced multilevel neural foraminal narrowing, particularly involving the right C4-C5, bilateral C5-C6, and left C6-C7 levels.     My impression patient's tinnitus could be cervicogenic in origin. It got better since her previous visit. It gets worse when she lays back and gets better if she is sitting or standing up.    Recommendations:  1- physical therapy  2- follow up with pain management, she is getting a cervical steroid and local anesthetic injection tomorrow, which could help with her cervical pain and tinnitus.    _________________________________________________________________    05.10.2024: Rebecca Saenz is a 53 y.o. female presenting with: \"White noise/static in ears\".  She has bilateral tinnitus in her ears for the past 6 months. Started after a trauma (fell from ladder). She has back pain, some neck tenderness. Ears look normal on exam    Ddx:  1- cervicogenic tinnitus    Plan:  1- hearing test  2- follow up after MRI cervical     Past Medical History  She has a past medical history of Arthritis, Cataract (2018), Depression, Headache, Hyperlipidemia, and Joint pain.    Surgical History  She has a past surgical history that includes Back surgery (05/19/2018); Eye surgery (05/19/2018); and Hysterectomy.     Social History  She reports that she has quit smoking. Her smoking use included cigarettes. She has a 22.5 pack-year smoking history. She has never used smokeless tobacco. She reports that she does not drink alcohol and does not use drugs.    Family History  Family History   Problem Relation Name Age of Onset    No Known Problems Mother      Arthritis Father Damaris     Cancer Father Damaris         Allergies  Sulfamethoxazole    Review of Systems (initial ROS)  Static/white noise in ears     Physical Exam (old exam)    General appearance: Healthy-appearing, well-nourished, " well groomed, in no acute distress.     Head and Face: Atraumatic with no masses, lesions, or scarring.      Salivary glands: No tenderness of the parotid glands or parotid masses.     No tenderness of the submandibular glands or submandibular masses.      Facial strength: Normal strength and symmetry, no synkinesis or facial tic.     Eyes: Conjunctivas look non-hyperemic bilaterally    Ears: Bilaterally ear canals look normal. Tympanic membranes look intact, no hyperemia, fluid or retraction. Hearing grossly normal.      Nose: Mucosa looks normal. No purulent discharge.     Oral Cavity/Mouth: Lips and tongue look normal.     Throat: No postnasal discharge. No tonsil hypertrophy. No hyperemia.    Neck: Symmetrical, trachea midline.     Pulmonary: Normal respiratory effort.     Lymphatic: No palpable pathologic lymph nodes at neck.     Neurological/Psychiatric Orientation to person, place, and time: Normal.     Mood and affect: Normal.      Extremities: No clubbing.     Skin: No significant skin lesions were noted at face or neck        Procedure    Interpreted By:  Salvatore Mckenna,   STUDY:  MR CERVICAL SPINE WO IV CONTRAST;  5/15/2024 10:59 am      INDICATION:  Signs/Symptoms:cervical radiculopathy.      COMPARISON:  None.      ACCESSION NUMBER(S):  [id number]      ORDERING CLINICIAN:  KOKO HERNANDEZ      TECHNIQUE:  Multiplanar multisequence noncontrast MR imaging was performed  through the cervical spine. Noncontrast sagittal T1, T2, STIR, axial  T1 and axial T2/GE weighted images were acquired through the cervical  spine.      FINDINGS:  Cord: Normal in caliber and signal.      Epidural fluid: None.      Alignment: Straightening/reversal of the expected cervical lordosis.      Vertebral bodies: Mild degenerative endplate height loss at C4-C7  with otherwise grossly preserved vertebral body heights.      Marrow signal: Questionable trace type 1 Modic endplate signal change  at C6-C7 and involving the inferior  endplates of C4 and C5. Otherwise  within normal limits.      Intervertebral Discs: Moderate degenerative disc height loss at C5-C6  and C6-C7 with mild involvement at C4-C5.          Degenerative change:      C1-C2:  Mild arthrosis centered about the dens. No spinal canal  stenosis.      C2-C3:  Severe left facet arthropathy. Mild left uncovertebral  spurring. No spinal canal stenosis or right neural foraminal  narrowing moderate left neural foraminal narrowing suggested.      C3-C4:  Moderate left facet arthropathy. Left-greater-than-right  uncovertebral spurring with mild disc bulge. No spinal canal  stenosis. Mild right and moderate left neural foraminal narrowing      C4-C5:  Disc bulge and right-greater-than-left uncovertebral  spurring. Minimal spinal canal stenosis. Severe right and mild left  neural foraminal narrowing.      C5-C6:  Slight ligamentum flavum thickening/infolding. Disc bulge and  uncovertebral/endplate spurring. Mild spinal canal stenosis. Severe  right-greater-than-left neural foraminal narrowing.      C6-C7:  Mild facet arthropathy. Disc bulge and uncovertebral/endplate  spurring, worse along the leftward aspect. Ventral thecal sac  indentation without spinal canal stenosis. Moderate to severe left  and mild right neural foraminal narrowing      C7-T1:  Moderate left and mild right facet arthropathy. No spinal  canal stenosis. No right and mild left neural foraminal narrowing.      Soft tissues: Brief retropharyngeal course of the internal carotid  arteries. Otherwise the prevertebral and posterior paraspinous soft  tissues are within normal limits.      IMPRESSION:  Mild spinal canal stenosis at C5-C6.      Advanced multilevel neural foraminal narrowing, particularly  involving the right C4-C5, bilateral C5-C6, and left C6-C7 levels  with additional moderate multilevel neural foraminal narrowing as  detailed above.      MACRO:  None      Signed by: Salvatore Mckenna 5/15/2024 1:30  "PM  Dictation workstation:   [id number]     Last Recorded Vitals  There were no vitals taken for this visit.    Relevant Results    Assessment and Plan:    06.24.2024: She had her hearing test today. She has mild hearing loss at high frequencies. MRI shows advanced multilevel neural foraminal narrowing, particularly involving the right C4-C5, bilateral C5-C6, and left C6-C7 levels.     My impression patient's tinnitus could be cervicogenic in origin. It got better since her previous visit. It gets worse when she lays back and gets better if she is sitting or standing up.    Recommendations:  1- physical therapy  2- follow up with pain management, she is getting a cervical steroid and local anesthetic injection tomorrow, which could help with her cervical pain and tinnitus.  _________________________________________________________________    05.10.2024:  Rebecca Saenz is a 53 y.o. female presenting with: \"White noise/static in ears\".  She has bilateral tinnitus in her ears for the past 6 months. Started after a trauma (fell from ladder). She has back pain, some neck tenderness. Ears look normal on exam    Ddx:  1- cervicogenic tinnitus    Plan:  1- hearing test  2- follow up after MRI cervical      Anju Dye  Otolaryngology - Head & Neck Surgery  "

## 2024-06-25 ENCOUNTER — HOSPITAL ENCOUNTER (OUTPATIENT)
Dept: RADIOLOGY | Facility: HOSPITAL | Age: 54
Discharge: HOME | End: 2024-06-25
Payer: COMMERCIAL

## 2024-06-25 ENCOUNTER — HOSPITAL ENCOUNTER (OUTPATIENT)
Dept: PAIN MEDICINE | Facility: HOSPITAL | Age: 54
Discharge: HOME | End: 2024-06-25
Payer: COMMERCIAL

## 2024-06-25 VITALS
SYSTOLIC BLOOD PRESSURE: 142 MMHG | TEMPERATURE: 97.9 F | OXYGEN SATURATION: 100 % | BODY MASS INDEX: 32.2 KG/M2 | DIASTOLIC BLOOD PRESSURE: 93 MMHG | HEART RATE: 89 BPM | RESPIRATION RATE: 17 BRPM | HEIGHT: 71 IN | WEIGHT: 230 LBS

## 2024-06-25 DIAGNOSIS — M54.12 CERVICAL RADICULOPATHY AT C6: ICD-10-CM

## 2024-06-25 DIAGNOSIS — M96.1 POSTLAMINECTOMY SYNDROME OF LUMBAR REGION: ICD-10-CM

## 2024-06-25 DIAGNOSIS — M48.062 LUMBAR STENOSIS WITH NEUROGENIC CLAUDICATION: ICD-10-CM

## 2024-06-25 DIAGNOSIS — M54.16 CHRONIC LUMBAR RADICULOPATHY: ICD-10-CM

## 2024-06-25 DIAGNOSIS — M47.817 FACET ARTHROPATHY, LUMBOSACRAL: ICD-10-CM

## 2024-06-25 PROCEDURE — 2500000004 HC RX 250 GENERAL PHARMACY W/ HCPCS (ALT 636 FOR OP/ED): Performed by: ANESTHESIOLOGY

## 2024-06-25 PROCEDURE — 2550000001 HC RX 255 CONTRASTS: Performed by: ANESTHESIOLOGY

## 2024-06-25 RX ORDER — GABAPENTIN 600 MG/1
1200 TABLET ORAL 3 TIMES DAILY
Qty: 180 TABLET | Refills: 0 | Status: SHIPPED | OUTPATIENT
Start: 2024-06-25

## 2024-06-25 RX ORDER — BETAMETHASONE SODIUM PHOSPHATE AND BETAMETHASONE ACETATE 3; 3 MG/ML; MG/ML
INJECTION, SUSPENSION INTRA-ARTICULAR; INTRALESIONAL; INTRAMUSCULAR; SOFT TISSUE AS NEEDED
Status: COMPLETED | OUTPATIENT
Start: 2024-06-25 | End: 2024-06-25

## 2024-06-25 ASSESSMENT — COLUMBIA-SUICIDE SEVERITY RATING SCALE - C-SSRS
6. HAVE YOU EVER DONE ANYTHING, STARTED TO DO ANYTHING, OR PREPARED TO DO ANYTHING TO END YOUR LIFE?: NO
6. HAVE YOU EVER DONE ANYTHING, STARTED TO DO ANYTHING, OR PREPARED TO DO ANYTHING TO END YOUR LIFE?: NO
2. HAVE YOU ACTUALLY HAD ANY THOUGHTS OF KILLING YOURSELF?: NO
1. IN THE PAST MONTH, HAVE YOU WISHED YOU WERE DEAD OR WISHED YOU COULD GO TO SLEEP AND NOT WAKE UP?: NO

## 2024-06-25 ASSESSMENT — PAIN - FUNCTIONAL ASSESSMENT
PAIN_FUNCTIONAL_ASSESSMENT: 0-10
PAIN_FUNCTIONAL_ASSESSMENT: 0-10

## 2024-06-25 ASSESSMENT — PAIN SCALES - GENERAL
PAINLEVEL_OUTOF10: 0 - NO PAIN
PAINLEVEL_OUTOF10: 5 - MODERATE PAIN

## 2024-06-25 NOTE — DISCHARGE INSTRUCTIONS
Discharge Instructions:   ° Keep Band-Aid on for the next 24 hours.    ° No showering/bathing for the next 24 hours.    ° You may notice soreness or increased pain in the area of your injection, which may continue for the first 48 hours.    ° Avoid driving or operating any heavy machinery until the next day after the procedure.  ° You should resume any medications and your regular diet after the procedure.  ° You may resume regular daily activity but should avoid strenuous activity the day of the procedure.  Some of the side affects you may experience from the steroids are:  ° Insomnia (inability to sleep)  ° Increased sweating  ° Headaches  ° Increased fluid retention (swelling of your extremities)  ° Increase appetite  ° Face flushing  ° If you are a diabetic, your blood sugars may go up.  Closely monitor your diet.  Your blood sugar should return to normal in a few days.  Complications:   ° Complications are rare with the most common being temporary increase pain near the injection site. You can apply ice to affected area on the day of the procedure.   ° If the discomfort persists, apply moist heat to the area. Serious complications are very uncommon but may include bleeding, infection or nerve damage.   ° If severe pain, fever, redness or swelling near the injection site, have someone take you to the nearest emergency room to be evaluated for procedure complications or infection.  Expectations:   ° Local anesthetics wear off in several hours but duration of relief varies from individual to individual.     If you have any questions, please call the office at [phone].    If this is an emergency, call [phone] or go to your nearest hospital.

## 2024-06-26 ASSESSMENT — PAIN SCALES - GENERAL: PAINLEVEL_OUTOF10: 7

## 2024-07-03 ENCOUNTER — TELEPHONE (OUTPATIENT)
Dept: PAIN MEDICINE | Facility: HOSPITAL | Age: 54
End: 2024-07-03
Payer: COMMERCIAL

## 2024-07-03 DIAGNOSIS — M48.062 LUMBAR STENOSIS WITH NEUROGENIC CLAUDICATION: ICD-10-CM

## 2024-07-03 DIAGNOSIS — M54.16 CHRONIC LUMBAR RADICULOPATHY: ICD-10-CM

## 2024-07-03 DIAGNOSIS — M96.1 POSTLAMINECTOMY SYNDROME OF LUMBAR REGION: ICD-10-CM

## 2024-07-03 RX ORDER — HYDROCODONE BITARTRATE AND ACETAMINOPHEN 5; 325 MG/1; MG/1
1 TABLET ORAL 3 TIMES DAILY PRN
Qty: 90 TABLET | Refills: 0 | Status: SHIPPED | OUTPATIENT
Start: 2024-07-05

## 2024-07-18 ENCOUNTER — APPOINTMENT (OUTPATIENT)
Dept: PRIMARY CARE | Facility: CLINIC | Age: 54
End: 2024-07-18
Payer: COMMERCIAL

## 2024-08-06 ENCOUNTER — OFFICE VISIT (OUTPATIENT)
Dept: PAIN MEDICINE | Facility: HOSPITAL | Age: 54
End: 2024-08-06
Payer: COMMERCIAL

## 2024-08-06 VITALS
OXYGEN SATURATION: 96 % | RESPIRATION RATE: 17 BRPM | BODY MASS INDEX: 31.5 KG/M2 | TEMPERATURE: 97.3 F | SYSTOLIC BLOOD PRESSURE: 135 MMHG | DIASTOLIC BLOOD PRESSURE: 96 MMHG | HEART RATE: 109 BPM | WEIGHT: 225 LBS | HEIGHT: 71 IN

## 2024-08-06 DIAGNOSIS — M96.1 POSTLAMINECTOMY SYNDROME OF LUMBAR REGION: ICD-10-CM

## 2024-08-06 DIAGNOSIS — M54.12 CERVICAL RADICULOPATHY AT C6: Primary | ICD-10-CM

## 2024-08-06 DIAGNOSIS — Z79.899 MEDICATION MANAGEMENT: ICD-10-CM

## 2024-08-06 DIAGNOSIS — M51.16 LUMBAR DISC HERNIATION WITH RADICULOPATHY: ICD-10-CM

## 2024-08-06 DIAGNOSIS — M48.062 LUMBAR STENOSIS WITH NEUROGENIC CLAUDICATION: ICD-10-CM

## 2024-08-06 DIAGNOSIS — M54.16 CHRONIC LUMBAR RADICULOPATHY: ICD-10-CM

## 2024-08-06 LAB
AMPHETAMINES UR QL SCN: NORMAL
BARBITURATES UR QL SCN: NORMAL
BZE UR QL SCN: NORMAL
CANNABINOIDS UR QL SCN: NORMAL
CREAT UR-MCNC: 184.6 MG/DL (ref 20–320)
PCP UR QL SCN: NORMAL

## 2024-08-06 PROCEDURE — 80307 DRUG TEST PRSMV CHEM ANLYZR: CPT | Performed by: ANESTHESIOLOGY

## 2024-08-06 PROCEDURE — 80365 DRUG SCREENING OXYCODONE: CPT | Performed by: ANESTHESIOLOGY

## 2024-08-06 PROCEDURE — 99213 OFFICE O/P EST LOW 20 MIN: CPT | Performed by: ANESTHESIOLOGY

## 2024-08-06 PROCEDURE — 80355 GABAPENTIN NON-BLOOD: CPT | Performed by: ANESTHESIOLOGY

## 2024-08-06 RX ORDER — HYDROCODONE BITARTRATE AND ACETAMINOPHEN 5; 325 MG/1; MG/1
1 TABLET ORAL 3 TIMES DAILY PRN
Qty: 90 TABLET | Refills: 0 | Status: SHIPPED | OUTPATIENT
Start: 2024-08-06

## 2024-08-06 ASSESSMENT — PAIN SCALES - GENERAL: PAINLEVEL: 5

## 2024-08-06 NOTE — PROGRESS NOTES
Subjective   Patient ID: Rebecca Saenz is a 53 y.o. female who presents for Pain (Neck pain).  Patient states she has been minimal relief after cervical epidural block.  She still complains of bilateral upper extremity pain and numbness and tingling which is consistent with her neuroforaminal stenosis at the C5-6 C6-7 level causing nerve root impingement.  HPI  History of multiple previous lumbar laminectomies; medication management for pain control  Review of Systems  Is remarkable for continued pain involving both upper extremities.  Objective   Physical Exam  Gen. appearance:  Patient's alert and oriented ×3 ;cooperative mild to moderate distress  Heart: Regular rate and rhythm  Lungs: Clear to auscultation  Abdomen: Soft nontender  Neuro: Cranial nerves II -XII intact; DTR: +2 over 4 biceps triceps brachial radialis patellar and Achilles  Spinal exam: Positive paraspinal tenderness noted bilaterally L4 5 L5-S1 with flexion extension and rotation/no bony abnormalities  Musculoskeletal:  Upper and lower extremity strength was 4/5 bilaterally  :  deferred  Skin: Warm and dry      Assessment/Plan   Diagnoses and all orders for this visit:  Cervical radiculopathy at C6  -     HYDROcodone-acetaminophen (Norco) 5-325 mg tablet; Take 1 tablet by mouth 3 times a day as needed for severe pain (7 - 10).  -     Follow Up In Pain Medicine; Future  Medication management  -     Opiate/Opioid/Benzo Prescription Compliance; Future  -     Gabapentin,Urine; Future  -     OOB Internal Tracking  -     HYDROcodone-acetaminophen (Norco) 5-325 mg tablet; Take 1 tablet by mouth 3 times a day as needed for severe pain (7 - 10).  -     Follow Up In Pain Medicine; Future  Postlaminectomy syndrome of lumbar region  -     HYDROcodone-acetaminophen (Norco) 5-325 mg tablet; Take 1 tablet by mouth 3 times a day as needed for severe pain (7 - 10).  -     Follow Up In Pain Medicine; Future  Lumbar disc herniation with radiculopathy  Lumbar  stenosis with neurogenic claudication  -     HYDROcodone-acetaminophen (Norco) 5-325 mg tablet; Take 1 tablet by mouth 3 times a day as needed for severe pain (7 - 10).  -     Follow Up In Pain Medicine; Future  Chronic lumbar radiculopathy  -     HYDROcodone-acetaminophen (Norco) 5-325 mg tablet; Take 1 tablet by mouth 3 times a day as needed for severe pain (7 - 10).  -     Follow Up In Pain Medicine; Future     Follow-up in 6 weeks    Malick Ching DO 08/06/24 2:55 PM

## 2024-08-06 NOTE — PROGRESS NOTES
Patient is a 53 year old female here for post procedure follow up for cervical BERENICE  on 6/25/2024. Patient states she received 0% relief following procedure and feels she felt no difference once numbing medication wore off. Patient rates pain today 5/10 to neck on pain scale and describes it as constant, cramping, sharp, shooting and throbbing. Patient states is does radiate down left arm, she started having pain in right shoulder that radiates down front of chest. Patient takes Norco for pain states that it gives her anywhere between 50% relief when taking. Patient has 7 pills remaining and needs refill.    Date of the last Controlled Substance Agreement: 12/11/2023       Last urine drug screening date/ordered today: 08/06/24     Results of last screen: N/A      Last opioid risk screening date/ordered today: 12/11/2023      Pain Scale Screening:   Pain Assessment and Documentation Tool (PADT)   Date of Assessment: 8/6/2024  Analgesia:   Patient reports her pain level on average during the past week is 5on a 0 - 10 scale.   Patient reports that her pain level at its worst during the past week was 8 on a 0 -10 scale.   10-50% of pain has been relieved during the past week per patient   Patient states that the amount of pain relief she is now obtaining from her current pain reliever(s) is enough to make a real difference in her life.   Query to clinician: Is the patient's pain relief clinically significant? N/A  Activities of Daily Living:   Physical functioning: unchanged  Family relationships: unchanged  Social relationships: unchanged  Mood: unchanged  Sleep patterns: unchanged  Overall functioning: unchanged  Adverse Events: No, Rebecca MENA Dany is not experiencing side effects from current pain reliever.  Patients overall severity of side effect:none  Specific Analgesic Plan: Continue present regimen.

## 2024-08-09 LAB — GABAPENTIN UR-MCNC: >500 UG/ML

## 2024-08-11 LAB
1OH-MIDAZOLAM UR CFM-MCNC: <25 NG/ML
6MAM UR CFM-MCNC: <25 NG/ML
7AMINOCLONAZEPAM UR CFM-MCNC: <25 NG/ML
A-OH ALPRAZ UR CFM-MCNC: <25 NG/ML
ALPRAZ UR CFM-MCNC: <25 NG/ML
CHLORDIAZEP UR CFM-MCNC: <25 NG/ML
CLONAZEPAM UR CFM-MCNC: <25 NG/ML
CODEINE UR CFM-MCNC: <50 NG/ML
DIAZEPAM UR CFM-MCNC: <25 NG/ML
EDDP UR CFM-MCNC: <25 NG/ML
FENTANYL UR CFM-MCNC: <2.5 NG/ML
HYDROCODONE CTO UR CFM-MCNC: 1893 NG/ML
HYDROMORPHONE UR CFM-MCNC: 200 NG/ML
LORAZEPAM UR CFM-MCNC: <25 NG/ML
METHADONE UR CFM-MCNC: <25 NG/ML
MIDAZOLAM UR CFM-MCNC: <25 NG/ML
MORPHINE UR CFM-MCNC: <50 NG/ML
NORDIAZEPAM UR CFM-MCNC: <25 NG/ML
NORFENTANYL UR CFM-MCNC: <2.5 NG/ML
NORHYDROCODONE UR CFM-MCNC: >1000 NG/ML
NOROXYCODONE UR CFM-MCNC: <25 NG/ML
NORTRAMADOL UR-MCNC: <50 NG/ML
OXAZEPAM UR CFM-MCNC: <25 NG/ML
OXYCODONE UR CFM-MCNC: <25 NG/ML
OXYMORPHONE UR CFM-MCNC: <25 NG/ML
TEMAZEPAM UR CFM-MCNC: <25 NG/ML
TRAMADOL UR CFM-MCNC: <50 NG/ML
ZOLPIDEM UR CFM-MCNC: <25 NG/ML
ZOLPIDEM UR-MCNC: <25 NG/ML

## 2024-09-17 ENCOUNTER — APPOINTMENT (OUTPATIENT)
Dept: PAIN MEDICINE | Facility: HOSPITAL | Age: 54
End: 2024-09-17

## 2024-09-19 ENCOUNTER — APPOINTMENT (OUTPATIENT)
Dept: RADIOLOGY | Facility: HOSPITAL | Age: 54
End: 2024-09-19
Payer: OTHER GOVERNMENT

## 2024-09-19 ENCOUNTER — HOSPITAL ENCOUNTER (EMERGENCY)
Facility: HOSPITAL | Age: 54
Discharge: LEFT BEFORE DISCHARGE | End: 2024-09-20
Attending: STUDENT IN AN ORGANIZED HEALTH CARE EDUCATION/TRAINING PROGRAM
Payer: OTHER GOVERNMENT

## 2024-09-19 ENCOUNTER — APPOINTMENT (OUTPATIENT)
Dept: CARDIOLOGY | Facility: HOSPITAL | Age: 54
End: 2024-09-19
Payer: OTHER GOVERNMENT

## 2024-09-19 DIAGNOSIS — R06.00 DYSPNEA, UNSPECIFIED TYPE: ICD-10-CM

## 2024-09-19 DIAGNOSIS — G89.18 PAIN AT SURGICAL SITE: Primary | ICD-10-CM

## 2024-09-19 DIAGNOSIS — L03.221 CELLULITIS OF NECK: ICD-10-CM

## 2024-09-19 LAB
ALBUMIN SERPL BCP-MCNC: 4.2 G/DL (ref 3.4–5)
ALP SERPL-CCNC: 81 U/L (ref 33–110)
ALT SERPL W P-5'-P-CCNC: 12 U/L (ref 7–45)
ANION GAP SERPL CALC-SCNC: 13 MMOL/L (ref 10–20)
APTT PPP: 30 SECONDS (ref 27–38)
AST SERPL W P-5'-P-CCNC: 15 U/L (ref 9–39)
BASOPHILS # BLD AUTO: 0.06 X10*3/UL (ref 0–0.1)
BASOPHILS NFR BLD AUTO: 0.5 %
BILIRUB SERPL-MCNC: 0.4 MG/DL (ref 0–1.2)
BUN SERPL-MCNC: 12 MG/DL (ref 6–23)
CALCIUM SERPL-MCNC: 8.9 MG/DL (ref 8.6–10.3)
CARDIAC TROPONIN I PNL SERPL HS: <3 NG/L (ref 0–13)
CARDIAC TROPONIN I PNL SERPL HS: <3 NG/L (ref 0–13)
CHLORIDE SERPL-SCNC: 100 MMOL/L (ref 98–107)
CO2 SERPL-SCNC: 28 MMOL/L (ref 21–32)
CREAT SERPL-MCNC: 1.05 MG/DL (ref 0.5–1.05)
EGFRCR SERPLBLD CKD-EPI 2021: 63 ML/MIN/1.73M*2
EOSINOPHIL # BLD AUTO: 0.21 X10*3/UL (ref 0–0.7)
EOSINOPHIL NFR BLD AUTO: 1.8 %
ERYTHROCYTE [DISTWIDTH] IN BLOOD BY AUTOMATED COUNT: 14.1 % (ref 11.5–14.5)
GLUCOSE SERPL-MCNC: 104 MG/DL (ref 74–99)
HCT VFR BLD AUTO: 36.7 % (ref 36–46)
HGB BLD-MCNC: 12.1 G/DL (ref 12–16)
IMM GRANULOCYTES # BLD AUTO: 0.03 X10*3/UL (ref 0–0.7)
IMM GRANULOCYTES NFR BLD AUTO: 0.3 % (ref 0–0.9)
INR PPP: 1 (ref 0.9–1.1)
LACTATE SERPL-SCNC: 1.1 MMOL/L (ref 0.4–2)
LYMPHOCYTES # BLD AUTO: 5.26 X10*3/UL (ref 1.2–4.8)
LYMPHOCYTES NFR BLD AUTO: 44 %
MAGNESIUM SERPL-MCNC: 1.73 MG/DL (ref 1.6–2.4)
MCH RBC QN AUTO: 30.3 PG (ref 26–34)
MCHC RBC AUTO-ENTMCNC: 33 G/DL (ref 32–36)
MCV RBC AUTO: 92 FL (ref 80–100)
MONOCYTES # BLD AUTO: 1.02 X10*3/UL (ref 0.1–1)
MONOCYTES NFR BLD AUTO: 8.5 %
NEUTROPHILS # BLD AUTO: 5.38 X10*3/UL (ref 1.2–7.7)
NEUTROPHILS NFR BLD AUTO: 44.9 %
NRBC BLD-RTO: 0 /100 WBCS (ref 0–0)
PLATELET # BLD AUTO: 362 X10*3/UL (ref 150–450)
POTASSIUM SERPL-SCNC: 3.4 MMOL/L (ref 3.5–5.3)
PROT SERPL-MCNC: 7.4 G/DL (ref 6.4–8.2)
PROTHROMBIN TIME: 11.6 SECONDS (ref 9.8–12.8)
RBC # BLD AUTO: 4 X10*6/UL (ref 4–5.2)
SARS-COV-2 RNA RESP QL NAA+PROBE: NOT DETECTED
SODIUM SERPL-SCNC: 138 MMOL/L (ref 136–145)
WBC # BLD AUTO: 12 X10*3/UL (ref 4.4–11.3)

## 2024-09-19 PROCEDURE — 36415 COLL VENOUS BLD VENIPUNCTURE: CPT | Performed by: PHYSICIAN ASSISTANT

## 2024-09-19 PROCEDURE — 96361 HYDRATE IV INFUSION ADD-ON: CPT

## 2024-09-19 PROCEDURE — 93005 ELECTROCARDIOGRAM TRACING: CPT

## 2024-09-19 PROCEDURE — 80053 COMPREHEN METABOLIC PANEL: CPT | Performed by: PHYSICIAN ASSISTANT

## 2024-09-19 PROCEDURE — 99285 EMERGENCY DEPT VISIT HI MDM: CPT | Mod: 25

## 2024-09-19 PROCEDURE — 84484 ASSAY OF TROPONIN QUANT: CPT | Performed by: PHYSICIAN ASSISTANT

## 2024-09-19 PROCEDURE — 85025 COMPLETE CBC W/AUTO DIFF WBC: CPT | Performed by: PHYSICIAN ASSISTANT

## 2024-09-19 PROCEDURE — 2550000001 HC RX 255 CONTRASTS: Performed by: STUDENT IN AN ORGANIZED HEALTH CARE EDUCATION/TRAINING PROGRAM

## 2024-09-19 PROCEDURE — 85610 PROTHROMBIN TIME: CPT | Performed by: PHYSICIAN ASSISTANT

## 2024-09-19 PROCEDURE — 83735 ASSAY OF MAGNESIUM: CPT | Performed by: PHYSICIAN ASSISTANT

## 2024-09-19 PROCEDURE — 70491 CT SOFT TISSUE NECK W/DYE: CPT

## 2024-09-19 PROCEDURE — 85730 THROMBOPLASTIN TIME PARTIAL: CPT | Performed by: PHYSICIAN ASSISTANT

## 2024-09-19 PROCEDURE — 87635 SARS-COV-2 COVID-19 AMP PRB: CPT | Performed by: PHYSICIAN ASSISTANT

## 2024-09-19 PROCEDURE — 2500000004 HC RX 250 GENERAL PHARMACY W/ HCPCS (ALT 636 FOR OP/ED): Performed by: PHYSICIAN ASSISTANT

## 2024-09-19 PROCEDURE — 87040 BLOOD CULTURE FOR BACTERIA: CPT | Mod: GENLAB | Performed by: PHYSICIAN ASSISTANT

## 2024-09-19 PROCEDURE — 2500000004 HC RX 250 GENERAL PHARMACY W/ HCPCS (ALT 636 FOR OP/ED)

## 2024-09-19 PROCEDURE — 71275 CT ANGIOGRAPHY CHEST: CPT | Performed by: RADIOLOGY

## 2024-09-19 PROCEDURE — 70491 CT SOFT TISSUE NECK W/DYE: CPT | Performed by: RADIOLOGY

## 2024-09-19 PROCEDURE — 83605 ASSAY OF LACTIC ACID: CPT | Performed by: PHYSICIAN ASSISTANT

## 2024-09-19 PROCEDURE — 96375 TX/PRO/DX INJ NEW DRUG ADDON: CPT | Mod: 59

## 2024-09-19 PROCEDURE — 71275 CT ANGIOGRAPHY CHEST: CPT

## 2024-09-19 RX ORDER — MORPHINE SULFATE 4 MG/ML
INJECTION, SOLUTION INTRAMUSCULAR; INTRAVENOUS
Status: COMPLETED
Start: 2024-09-19 | End: 2024-09-19

## 2024-09-19 RX ORDER — MORPHINE SULFATE 4 MG/ML
4 INJECTION, SOLUTION INTRAMUSCULAR; INTRAVENOUS ONCE
Status: COMPLETED | OUTPATIENT
Start: 2024-09-19 | End: 2024-09-19

## 2024-09-19 RX ADMIN — IOHEXOL 75 ML: 350 INJECTION, SOLUTION INTRAVENOUS at 23:16

## 2024-09-19 RX ADMIN — MORPHINE SULFATE 4 MG: 4 INJECTION, SOLUTION INTRAMUSCULAR; INTRAVENOUS at 23:01

## 2024-09-19 RX ADMIN — SODIUM CHLORIDE 1000 ML: 9 INJECTION, SOLUTION INTRAVENOUS at 21:10

## 2024-09-19 ASSESSMENT — PAIN DESCRIPTION - LOCATION
LOCATION: NECK
LOCATION: NECK

## 2024-09-19 ASSESSMENT — PAIN DESCRIPTION - PAIN TYPE: TYPE: ACUTE PAIN

## 2024-09-19 ASSESSMENT — PAIN DESCRIPTION - FREQUENCY: FREQUENCY: CONSTANT/CONTINUOUS

## 2024-09-19 ASSESSMENT — PAIN SCALES - GENERAL
PAINLEVEL_OUTOF10: 8
PAINLEVEL_OUTOF10: 6
PAINLEVEL_OUTOF10: 7

## 2024-09-19 ASSESSMENT — PAIN SCALES - PAIN ASSESSMENT IN ADVANCED DEMENTIA (PAINAD): TOTALSCORE: MEDICATION (SEE MAR)

## 2024-09-19 ASSESSMENT — PAIN - FUNCTIONAL ASSESSMENT: PAIN_FUNCTIONAL_ASSESSMENT: 0-10

## 2024-09-19 ASSESSMENT — PAIN DESCRIPTION - ORIENTATION: ORIENTATION: RIGHT

## 2024-09-19 ASSESSMENT — PAIN DESCRIPTION - DESCRIPTORS: DESCRIPTORS: ACHING

## 2024-09-20 VITALS
WEIGHT: 230 LBS | OXYGEN SATURATION: 98 % | BODY MASS INDEX: 32.2 KG/M2 | TEMPERATURE: 97.4 F | DIASTOLIC BLOOD PRESSURE: 75 MMHG | RESPIRATION RATE: 17 BRPM | SYSTOLIC BLOOD PRESSURE: 120 MMHG | HEART RATE: 94 BPM | HEIGHT: 71 IN

## 2024-09-20 PROCEDURE — 96375 TX/PRO/DX INJ NEW DRUG ADDON: CPT

## 2024-09-20 PROCEDURE — 96366 THER/PROPH/DIAG IV INF ADDON: CPT

## 2024-09-20 PROCEDURE — 96367 TX/PROPH/DG ADDL SEQ IV INF: CPT

## 2024-09-20 PROCEDURE — 2500000004 HC RX 250 GENERAL PHARMACY W/ HCPCS (ALT 636 FOR OP/ED): Performed by: STUDENT IN AN ORGANIZED HEALTH CARE EDUCATION/TRAINING PROGRAM

## 2024-09-20 PROCEDURE — 96365 THER/PROPH/DIAG IV INF INIT: CPT

## 2024-09-20 RX ORDER — CEPHALEXIN 500 MG/1
500 CAPSULE ORAL 4 TIMES DAILY
Qty: 40 CAPSULE | Refills: 0 | Status: SHIPPED | OUTPATIENT
Start: 2024-09-20 | End: 2024-09-30

## 2024-09-20 RX ORDER — VANCOMYCIN HYDROCHLORIDE 1 G/20ML
INJECTION, POWDER, LYOPHILIZED, FOR SOLUTION INTRAVENOUS DAILY PRN
Status: DISCONTINUED | OUTPATIENT
Start: 2024-09-20 | End: 2024-09-20

## 2024-09-20 RX ADMIN — PIPERACILLIN SODIUM AND TAZOBACTAM SODIUM 3.38 G: 3; .375 INJECTION, SOLUTION INTRAVENOUS at 01:35

## 2024-09-20 RX ADMIN — HYDROMORPHONE HYDROCHLORIDE 0.5 MG: 1 INJECTION, SOLUTION INTRAMUSCULAR; INTRAVENOUS; SUBCUTANEOUS at 01:34

## 2024-09-20 RX ADMIN — VANCOMYCIN HYDROCHLORIDE 1500 MG: 1.5 INJECTION, POWDER, LYOPHILIZED, FOR SOLUTION INTRAVENOUS at 02:33

## 2024-09-20 ASSESSMENT — PAIN SCALES - GENERAL
PAINLEVEL_OUTOF10: 7
PAINLEVEL_OUTOF10: 5 - MODERATE PAIN

## 2024-09-20 NOTE — ED TRIAGE NOTES
Patient had neck surgery on Tuesday , wound started swelling and became red and painful last night . Surgeon advised patient to go to ED for wound check

## 2024-09-20 NOTE — ED PROVIDER NOTES
HPI   Chief Complaint   Patient presents with    Wound Check     Surgical wound red swollen and painful       53-year-old female with past medical history positive for cervical and lumbar radiculopathy had C5-7 total disc replacement done by Dr. Mehrdad Stephen 2 days ago patient noted some increased swelling erythema overlying the incision site right side anterior neck, that started last night and she is had increased shortness of breath/chest pressure that becomes worse when she goes to lie down to the anterior chest    And a dry nonproductive cough  Denies any fevers    No abdominal pain no nausea vomiting diarrhea no lightheaded or dizziness              Patient History   Past Medical History:   Diagnosis Date    Arthritis     Cataract 2018    Depression     Headache     Hyperlipidemia     Joint pain      Past Surgical History:   Procedure Laterality Date    BACK SURGERY  05/19/2018    Back Surgery    EYE SURGERY  05/19/2018    Eye Surgery    HYSTERECTOMY       Family History   Problem Relation Name Age of Onset    No Known Problems Mother      Arthritis Father [place]     Cancer Father Damaris      Social History     Tobacco Use    Smoking status: Former     Current packs/day: 1.50     Average packs/day: 1.5 packs/day for 15.0 years (22.5 ttl pk-yrs)     Types: Cigarettes    Smokeless tobacco: Never   Vaping Use    Vaping status: Never Used   Substance Use Topics    Alcohol use: Never    Drug use: Never       Physical Exam   ED Triage Vitals [09/19/24 2037]   Temperature Heart Rate Respirations BP   36.9 °C (98.5 °F) (!) 122 20 124/80      Pulse Ox Temp Source Heart Rate Source Patient Position   96 % Oral -- Sitting      BP Location FiO2 (%)     Left arm --       Physical Exam  Vitals and nursing note reviewed.   Constitutional:       General: She is not in acute distress.     Appearance: Normal appearance. She is well-developed.   HENT:      Head: Normocephalic and atraumatic.      Right Ear: Tympanic membrane, ear  canal and external ear normal.      Left Ear: Tympanic membrane, ear canal and external ear normal.      Nose: Nose normal.      Mouth/Throat:      Mouth: Mucous membranes are moist.   Eyes:      Extraocular Movements: Extraocular movements intact.      Conjunctiva/sclera: Conjunctivae normal.      Pupils: Pupils are equal, round, and reactive to light.   Neck:      Comments: Incision over the anterior right side of the neck with surrounding erythema and slight soft tissue swelling    Erythema extends 6 cm in length and 9 cm wide surrounding the incision  Cardiovascular:      Rate and Rhythm: Regular rhythm. Tachycardia present.      Heart sounds: No murmur heard.  Pulmonary:      Effort: Pulmonary effort is normal. No respiratory distress.      Breath sounds: Normal breath sounds.   Abdominal:      Palpations: Abdomen is soft.      Tenderness: There is no abdominal tenderness.   Musculoskeletal:         General: No swelling.      Cervical back: Neck supple.   Skin:     General: Skin is dry.      Capillary Refill: Capillary refill takes less than 2 seconds.      Findings: Rash present.      Comments: Incision over the anterior right side of the neck with surrounding erythema and slight soft tissue swelling    Erythema extends 6 cm in length and 9 cm wide surrounding the incision   Neurological:      General: No focal deficit present.      Mental Status: She is alert.   Psychiatric:         Mood and Affect: Mood normal.           ED Course & MDM   Diagnoses as of 09/19/24 2109   Pain at surgical site   Dyspnea, unspecified type                 No data recorded                                 Medical Decision Making  Reported off to Dr. Maharaj she will dispo patient once all testing is back      Amount and/or Complexity of Data Reviewed  ECG/medicine tests: independent interpretation performed.     Details: EKG done at 857 shows sinus rhythm rate of 100 Axis normal QT/QTc 338/436        Procedure  Procedures      Christine Flannery PA-C  09/19/24 [id number]

## 2024-09-22 LAB
BACTERIA BLD CULT: NORMAL
BACTERIA BLD CULT: NORMAL

## 2024-09-24 LAB
ATRIAL RATE: 100 BPM
BACTERIA BLD CULT: NORMAL
BACTERIA BLD CULT: NORMAL
P AXIS: 40 DEGREES
P OFFSET: 203 MS
P ONSET: 146 MS
PR INTERVAL: 146 MS
Q ONSET: 219 MS
QRS COUNT: 17 BEATS
QRS DURATION: 82 MS
QT INTERVAL: 338 MS
QTC CALCULATION(BAZETT): 436 MS
QTC FREDERICIA: 401 MS
R AXIS: 28 DEGREES
T AXIS: 36 DEGREES
T OFFSET: 388 MS
VENTRICULAR RATE: 100 BPM

## 2025-01-24 ENCOUNTER — OFFICE VISIT (OUTPATIENT)
Dept: PAIN MEDICINE | Facility: HOSPITAL | Age: 55
End: 2025-01-24
Payer: OTHER GOVERNMENT

## 2025-01-24 VITALS
HEART RATE: 108 BPM | TEMPERATURE: 97.1 F | OXYGEN SATURATION: 99 % | BODY MASS INDEX: 31.36 KG/M2 | RESPIRATION RATE: 18 BRPM | WEIGHT: 224 LBS | DIASTOLIC BLOOD PRESSURE: 51 MMHG | HEIGHT: 71 IN | SYSTOLIC BLOOD PRESSURE: 92 MMHG

## 2025-01-24 DIAGNOSIS — M48.062 LUMBAR STENOSIS WITH NEUROGENIC CLAUDICATION: ICD-10-CM

## 2025-01-24 DIAGNOSIS — M96.1 POSTLAMINECTOMY SYNDROME OF LUMBAR REGION: ICD-10-CM

## 2025-01-24 DIAGNOSIS — Z79.891 ENCOUNTER FOR LONG-TERM USE OF OPIATE ANALGESIC: Primary | ICD-10-CM

## 2025-01-24 DIAGNOSIS — M54.12 CERVICAL RADICULOPATHY AT C6: ICD-10-CM

## 2025-01-24 DIAGNOSIS — M54.16 CHRONIC LUMBAR RADICULOPATHY: ICD-10-CM

## 2025-01-24 DIAGNOSIS — Z79.899 MEDICATION MANAGEMENT: ICD-10-CM

## 2025-01-24 PROCEDURE — 80355 GABAPENTIN NON-BLOOD: CPT | Performed by: NURSE PRACTITIONER

## 2025-01-24 PROCEDURE — 3008F BODY MASS INDEX DOCD: CPT | Performed by: NURSE PRACTITIONER

## 2025-01-24 PROCEDURE — 1036F TOBACCO NON-USER: CPT | Performed by: NURSE PRACTITIONER

## 2025-01-24 PROCEDURE — 99214 OFFICE O/P EST MOD 30 MIN: CPT | Performed by: NURSE PRACTITIONER

## 2025-01-24 PROCEDURE — 80307 DRUG TEST PRSMV CHEM ANLYZR: CPT | Performed by: NURSE PRACTITIONER

## 2025-01-24 RX ORDER — OXYCODONE AND ACETAMINOPHEN 5; 325 MG/1; MG/1
TABLET ORAL
COMMUNITY
Start: 2024-10-28

## 2025-01-24 RX ORDER — NALOXONE HYDROCHLORIDE 4 MG/.1ML
1 SPRAY NASAL AS NEEDED
Qty: 2 EACH | Refills: 0 | Status: SHIPPED | OUTPATIENT
Start: 2025-01-24

## 2025-01-24 RX ORDER — DIAZEPAM 5 MG/1
TABLET ORAL
COMMUNITY
Start: 2024-10-28

## 2025-01-24 RX ORDER — HYDROCODONE BITARTRATE AND ACETAMINOPHEN 5; 325 MG/1; MG/1
1 TABLET ORAL 3 TIMES DAILY PRN
Qty: 90 TABLET | Refills: 0 | Status: SHIPPED | OUTPATIENT
Start: 2025-01-24

## 2025-01-24 RX ORDER — TIZANIDINE 2 MG/1
TABLET ORAL
COMMUNITY
Start: 2023-03-01 | End: 2025-01-24

## 2025-01-24 RX ORDER — DULOXETIN HYDROCHLORIDE 30 MG/1
1 CAPSULE, DELAYED RELEASE ORAL
COMMUNITY
Start: 2024-04-25

## 2025-01-24 ASSESSMENT — ENCOUNTER SYMPTOMS
EYES NEGATIVE: 1
ARTHRALGIAS: 1
CARDIOVASCULAR NEGATIVE: 1
BACK PAIN: 1
ENDOCRINE NEGATIVE: 1
ALLERGIC/IMMUNOLOGIC NEGATIVE: 1
CONSTITUTIONAL NEGATIVE: 1
MYALGIAS: 1
NEUROLOGICAL NEGATIVE: 1
NECK STIFFNESS: 0
GASTROINTESTINAL NEGATIVE: 1
PSYCHIATRIC NEGATIVE: 1
JOINT SWELLING: 0
NECK PAIN: 0
RESPIRATORY NEGATIVE: 1

## 2025-01-24 ASSESSMENT — PAIN SCALES - GENERAL: PAINLEVEL_OUTOF10: 6

## 2025-01-24 ASSESSMENT — PATIENT HEALTH QUESTIONNAIRE - PHQ9
2. FEELING DOWN, DEPRESSED OR HOPELESS: NOT AT ALL
1. LITTLE INTEREST OR PLEASURE IN DOING THINGS: NOT AT ALL
SUM OF ALL RESPONSES TO PHQ9 QUESTIONS 1 AND 2: 0

## 2025-01-24 NOTE — PROGRESS NOTES
Date of the last Controlled Substance Agreement: 1/24/2025    Last urine drug screening date/ordered today: 8/6/2024.  Updated today        Opioid Risk Screening:   THE OPIOID RISK TOOL (ORT)                                                                      Female                     Male     1. Family History of Substance Abuse:                              Alcohol                                                   [1]= 0                          [3]  =     Illicit Drugs                                             [2] =0                          [3]   =    Prescription Drugs                                [4]=  0                        [4]   =    2. Personal History of Substance Abuse:     Alcohol                                                    [3] = 0                         [3] =     Illegal Drugs                                           [4] =   0                        [4]  =     Prescription Drugs                                [5]=  0                          [5]   =    3. Age (If between 16 to 45):               [1]= 0                          [1]   =    4. History of Preadolescent Sexual Abuse                                                                  [3]=   0                         [0]   =    5. Psychological Disease:    ADD, OCD, Bipolar, Schizophrenia    [2]=  0                          [2]   =    Depression                                          [1]= 0                            [1]   =      TOTAL Score =  0     Last opioid risk screening date/ordered today: 01/24/2025  Patient's total score is 0    Reference :  Low Score = 0 to 3  Moderate Score = 4 to 7  High Score = =8       Pain Scale Screening:   Pain Assessment and Documentation Tool (PADT)   Date of Assessment: `01/24/2025  Analgesia:   Patient reports her pain level on average during the past week is 7on a 0 - 10 scale.   Patient reports that her pain level at its worst during the past week was 8 on a 0 -10 scale.   Activities of  Daily Living:   Physical functioning: better  Family relationships: unchanged  Social relationships: unchanged  Mood: unchanged  Sleep patterns: better  Overall functioning: better  Adverse Events: NoRebecca Dany is not experiencing side effects from current pain reliever.  Patients overall severity of side effect:none  Specific Analgesic Plan: Continue present regimen.    Detail Level: Generalized Detail Level: Zone When Should The Patient Follow-Up For Their Next Full-Body Skin Exam?: 6 Months Quality 137: Melanoma: Continuity Of Care - Recall System: Patient information entered into a recall system that includes: target date for the next exam specified AND a process to follow up with patients regarding missed or unscheduled appointments

## 2025-01-24 NOTE — PATIENT INSTRUCTIONS
Continue with your prescribed medications.    I refilled your Norco.  I sent 90 tabs to your pharmacy.  Continue taking 1 pill 3 times a day for pain relief.  Do not take sedating medications together.    I will see her for follow-up visit in a month.    ---Risks and side effects of chronic opioid therapy, including but not limited to tolerance, dependence, constipation, hyperalgesia, cognitive side effects, addiction, and possible death due to overuse and or misuse, were discussed.   Medications, when co-administered with other sedative agents, including but not limited to alcohol, benzodiazepines, sedatives or hypnotics, and illegal drugs, could pose life-threatening consequences, including death.   Such medication could impact if you have obstructive sleep apnea. I recommend that you continue using apnea devices if ordered by other physicians.   Being compliant with the treatment plan and the terms of the opioid agreement to effectively and safely treat the pain is important.  Being responsible with the medications and taking these only as prescribed, never in excess, and never for reasons other than pain reduction.   Keep the medications safe and locked away from children and other adults, as well as disposal methods and options. The patient understood the risks and instructions. ---

## 2025-01-24 NOTE — PROGRESS NOTES
Subjective   Rebecca Saenz is a 54 y.o. female who is here for follow-up visit.  Patient is ambulatory without assistive device.  Gait is steady.  She arrives by herself.  Patient was last seen in August of last year.    Patient continues to have chronic lower back pain that goes down to her legs.  She rates her pain a 6 out of 10.  It is constant.  She describes it as aching, burning, cramping, sharp and shooting kind of pain.  She continues to have numbness, decreased sensation, pins and needle sensation down to her legs.  She also has loss in bowel and bladder control.  She denies recent falls or injuries.    Patient reports that she had neck surgery done at [facility] with Dr. Stephen on September 2024.  Based on chart review, she had C5-C6, C6-C7 anterior cervical discectomy and disc replacement on 9/17/2024.  Patient also mentioned that she is going to have a lower back surgery in March with Dr. Stephen.  She was taking Percocet after her neck surgery.  She reports that she is out of the 90-day window and was informed to seek pain management to continue with her pain medication as they will not be prescribing her the Percocet.  I discussed with her that I will not be prescribing Percocet however we will continue with the Norco and she is agreeable.    Patient is also complaining of hip bursitis with the left worse than the right.  I inform her that I do not know if we can do a trochanteric bursitis injection considering she is going to have surgery in March.  I informed her to discuss this with Dr. Stephen and if he is okay then we can schedule her for the injection.  Patient voiced understanding.  She will inform and ask Dr. Stephen.    I reviewed previous notes and imaging.  I discussed the plan of care.  I will see her for follow-up visit for medication refill.  Questions were answered during this encounter.      Based on [facility] report, patient has received Percocet prescription from different  providers.  Her last refill with us for the [place] was on 8/12/2024.    CARRI was done today which she scored 26.  This form was scanned.    The patient was counseled regarding diagnostic results, instructions for management, risk factor reductions, risks and benefits of treatment options and importance of compliance with treatment.    ----------------  PROCEDURES:    6/25/2024: Cervical BERENICE #1  4/30/2024: Left L1-L2 and L2-L3 TFESI  2/20/2024: Caudal BERENICE  1/2/2024: Left L1-L2 and L2-L3 TFESI  --------    O[facility]:  Nilsa Hare, APRN-CNP, DNP on 1/24/2025 11:28 AM  I have personally reviewed the OARRS report for Rebecca Saenz. I have considered the risks of abuse, dependence, addiction and diversion and I believe that it is clinically appropriate for Rebecca Saenz to be prescribed this medication    Review of Systems   Constitutional: Negative.    HENT: Negative.     Eyes: Negative.    Respiratory: Negative.     Cardiovascular: Negative.    Gastrointestinal: Negative.    Endocrine: Negative.    Genitourinary: Negative.    Musculoskeletal:  Positive for arthralgias, back pain and myalgias. Negative for gait problem, joint swelling, neck pain and neck stiffness.   Skin: Negative.    Allergic/Immunologic: Negative.    Neurological: Negative.    Psychiatric/Behavioral: Negative.            BP 92/51 (BP Location: Right arm, Patient Position: Sitting, BP Cuff Size: Adult)   Pulse 108   Temp 36.2 °C (97.1 °F)   Resp 18   Wt 102 kg (224 lb)   SpO2 99%  Body mass index is 31.24 kg/m².      Objective       Past Medical History  She has a past medical history of Arthritis, Cataract (2018), Depression, Headache, Hyperlipidemia, and Joint pain.    Surgical History  Past Surgical History:   Procedure Laterality Date    BACK SURGERY  05/19/2018    Back Surgery    CERVICAL DISCECTOMY  09/2024    C5-C7, CERVICAL DISC REPLACEMENT    EYE SURGERY  05/19/2018    Eye Surgery    HYSTERECTOMY          Social History  She reports that  she has quit smoking. Her smoking use included cigarettes. She has a 22.5 pack-year smoking history. She has never used smokeless tobacco. She reports that she does not drink alcohol and does not use drugs.    Family History  Family History   Problem Relation Name Age of Onset    No Known Problems Mother      Arthritis Father Damaris     Cancer Father Damaris         Allergies  Sulfamethoxazole    MEDICATIONS:    Current Outpatient Medications:     ascorbic acid (Vitamin C) 500 mg tablet, Take 1 tablet (500 mg) by mouth once daily., Disp: , Rfl:     calcium carbonate (CALCIUM 500 ORAL), Take by mouth., Disp: , Rfl:     cholecalciferol (Vitamin D3) 25 MCG (1000 UT) tablet, Take 1 tablet (25 mcg) by mouth once daily., Disp: , Rfl:     cyanocobalamin (Vitamin B-12) 1,000 mcg tablet, Take 100 mcg by mouth once daily., Disp: , Rfl:     cyclobenzaprine (Flexeril) 10 mg tablet, Take 1 tablet (10 mg) by mouth 3 times a day as needed for muscle spasms., Disp: 60 tablet, Rfl: 2    diazePAM (Valium) 5 mg tablet, TAKE ONE TABLET BY MOUTH EVERY 12 HOURS AS NEEDED FOR MUSCLE SPASM FOR UP TO 7 DAYS., Disp: , Rfl:     gabapentin (Neurontin) 600 mg tablet, Take 2 tablets (1,200 mg) by mouth 3 times a day., Disp: 180 tablet, Rfl: 0    garlic 500 mg capsule, Take by mouth., Disp: , Rfl:     methocarbamol (Robaxin) 750 mg tablet, Take 1 tablet (750 mg) by mouth 3 times a day., Disp: , Rfl:     oxyCODONE-acetaminophen (Percocet) 5-325 mg tablet, TAKE ONE TABLET BY MOUTH EVERY 12 HOURS AS NEEDED FOR ACUTE POST-OPERATIVE PAIN FOR UP TO 7 DAYS, Disp: , Rfl:     rOPINIRole (Requip) 1 mg tablet, Take 1 tablet (1 mg) by mouth once daily at bedtime., Disp: 90 tablet, Rfl: 1    venlafaxine XR (Effexor-XR) 37.5 mg 24 hr capsule, 1 capsule (37.5 mg)., Disp: , Rfl:     DULoxetine (Cymbalta) 30 mg DR capsule, Take 1 capsule (30 mg) by mouth early in the morning.. (Patient not taking: Reported on 1/24/2025), Disp: , Rfl:     HYDROcodone-acetaminophen  "(Norco) 5-325 mg tablet, Take 1 tablet by mouth 3 times a day as needed for severe pain (7 - 10)., Disp: 90 tablet, Rfl: 0    naloxone (Narcan) 4 mg/0.1 mL nasal spray, Administer 1 spray (4 mg) into affected nostril(s) if needed for opioid reversal. CALL 911 first. May repeat every 2-3 minutes if needed, alternating nostrils, until medical assistance becomes available., Disp: 2 each, Rfl: 0    Physical Exam  Vitals and nursing note reviewed.   HENT:      Head: Normocephalic.      Nose: Nose normal.   Eyes:      Extraocular Movements: Extraocular movements intact.      Conjunctiva/sclera: Conjunctivae normal.      Pupils: Pupils are equal, round, and reactive to light.   Cardiovascular:      Rate and Rhythm: Normal rate and regular rhythm.   Pulmonary:      Effort: Pulmonary effort is normal.      Breath sounds: Normal breath sounds.   Genitourinary:     Comments: Deferred  Musculoskeletal:         General: Tenderness present. No swelling, deformity or signs of injury.      Cervical back: No rigidity or tenderness.      Right lower leg: No edema.      Left lower leg: No edema.      Comments: \"For full disclosure, patient was asked to pull up their garment to properly visualize the spine. This is done by the patient without me touching their garment.  The spine/joints were examined using gloves.\"    Positive leg raise eliciting hip pain.  Positive for paraspinal tenderness at the lumbar region bilaterally at L4-L5, L5-S1 with rotation.  With nonspecific radicular symptoms.  Positive for tenderness on palpation at the trochanteric hip region with the left worse than the right.  BUE 5/5, BLE 4/5 due to pain.   Skin:     General: Skin is warm and dry.   Neurological:      General: No focal deficit present.      Mental Status: She is alert and oriented to person, place, and time.   Psychiatric:         Mood and Affect: Mood normal.         Behavior: Behavior normal.            Pain Management Panel  More data exists "         Latest Ref Rng & Units 8/6/2024 4/16/2024   Pain Management Panel   Amphetamine Screen, Urine Presumptive Negative Presumptive Negative  Presumptive Negative    Barbiturate Screen, Urine Presumptive Negative Presumptive Negative  Presumptive Negative    Codeine IgE <50 ng/mL <50  <50    Hydromorphone Urine <25 ng/mL 200  108    Morphine  <50 ng/mL <50  <50           Assessment/Plan   Problem List Items Addressed This Visit             ICD-10-CM    Chronic lumbar radiculopathy M54.16    Relevant Medications    HYDROcodone-acetaminophen (Norco) 5-325 mg tablet    Lumbar stenosis with neurogenic claudication M48.062    Relevant Medications    HYDROcodone-acetaminophen (Norco) 5-325 mg tablet    Postlaminectomy syndrome of lumbar region M96.1    Relevant Medications    HYDROcodone-acetaminophen (Norco) 5-325 mg tablet    Cervical radiculopathy at C6 M54.12    Relevant Medications    HYDROcodone-acetaminophen (Norco) 5-325 mg tablet    Encounter for long-term use of opiate analgesic - Primary Z79.891     Other Visit Diagnoses         Codes    Medication management     Z79.899    Relevant Medications    HYDROcodone-acetaminophen (Norco) 5-325 mg tablet    naloxone (Narcan) 4 mg/0.1 mL nasal spray    Other Relevant Orders    Opiate/Opioid/Benzo Prescription Compliance    Gabapentin,Urine    OOB Internal Tracking                Plan/Follow-up Instructions:    Continue with your prescribed medications.    I refilled your Norco.  I sent 90 tabs to your pharmacy.  Continue taking 1 pill 3 times a day for pain relief.  Do not take sedating medications together.    I will see her for follow-up visit in a month.    ---Risks and side effects of chronic opioid therapy, including but not limited to tolerance, dependence, constipation, hyperalgesia, cognitive side effects, addiction, and possible death due to overuse and or misuse, were discussed.   Medications, when co-administered with other sedative agents, including but  not limited to alcohol, benzodiazepines, sedatives or hypnotics, and illegal drugs, could pose life-threatening consequences, including death.   Such medication could impact if you have obstructive sleep apnea. I recommend that you continue using apnea devices if ordered by other physicians.   Being compliant with the treatment plan and the terms of the opioid agreement to effectively and safely treat the pain is important.  Being responsible with the medications and taking these only as prescribed, never in excess, and never for reasons other than pain reduction.   Keep the medications safe and locked away from children and other adults, as well as disposal methods and options. The patient understood the risks and instructions. ---      Time     Prep time on date of the patient encounter: 2 minutes  Time spent directly with patient/family/caregiver: 30 minutes  Documentation time: 2 minutes  Total time on date of patient encounter: 34 minutes      --------------  Disclaimer: This note was created using voice recognition software. It was not corrected for typographical or grammatical errors, inadvertent word insertion, or any unintended errors. Please feel free to contact me for clarification.  --------------      Nilsa Hare, AVANI, APRN, FNP-C   Formerly [facility], later [facility]  Office #: [phone]  Fax # [phone]

## 2025-01-25 LAB
AMPHETAMINES UR QL SCN: NORMAL
BARBITURATES UR QL SCN: NORMAL
BZE UR QL SCN: NORMAL
CANNABINOIDS UR QL SCN: NORMAL
CREAT UR-MCNC: 247.8 MG/DL (ref 20–320)
PCP UR QL SCN: NORMAL

## 2025-01-27 LAB — GABAPENTIN UR-MCNC: >500 UG/ML

## 2025-01-28 LAB
1OH-MIDAZOLAM UR CFM-MCNC: <25 NG/ML
6MAM UR CFM-MCNC: <25 NG/ML
7AMINOCLONAZEPAM UR CFM-MCNC: <25 NG/ML
A-OH ALPRAZ UR CFM-MCNC: <25 NG/ML
ALPRAZ UR CFM-MCNC: <25 NG/ML
CHLORDIAZEP UR CFM-MCNC: <25 NG/ML
CLONAZEPAM UR CFM-MCNC: <25 NG/ML
CODEINE UR CFM-MCNC: <50 NG/ML
DIAZEPAM UR CFM-MCNC: <25 NG/ML
EDDP UR CFM-MCNC: <25 NG/ML
FENTANYL UR CFM-MCNC: <2.5 NG/ML
HYDROCODONE CTO UR CFM-MCNC: 331 NG/ML
HYDROMORPHONE UR CFM-MCNC: 127 NG/ML
LORAZEPAM UR CFM-MCNC: <25 NG/ML
METHADONE UR CFM-MCNC: <25 NG/ML
MIDAZOLAM UR CFM-MCNC: <25 NG/ML
MORPHINE UR CFM-MCNC: <50 NG/ML
NORDIAZEPAM UR CFM-MCNC: <25 NG/ML
NORFENTANYL UR CFM-MCNC: <2.5 NG/ML
NORHYDROCODONE UR CFM-MCNC: >1000 NG/ML
NOROXYCODONE UR CFM-MCNC: <25 NG/ML
NORTRAMADOL UR-MCNC: <50 NG/ML
OXAZEPAM UR CFM-MCNC: <25 NG/ML
OXYCODONE UR CFM-MCNC: <25 NG/ML
OXYMORPHONE UR CFM-MCNC: <25 NG/ML
TEMAZEPAM UR CFM-MCNC: <25 NG/ML
TRAMADOL UR CFM-MCNC: <50 NG/ML
ZOLPIDEM UR CFM-MCNC: <25 NG/ML
ZOLPIDEM UR-MCNC: <25 NG/ML

## 2025-02-13 ENCOUNTER — HOSPITAL ENCOUNTER (OUTPATIENT)
Dept: RADIOLOGY | Facility: HOSPITAL | Age: 55
Discharge: HOME | End: 2025-02-13
Payer: OTHER GOVERNMENT

## 2025-02-13 VITALS — WEIGHT: 224 LBS | BODY MASS INDEX: 31.36 KG/M2 | HEIGHT: 71 IN

## 2025-02-13 DIAGNOSIS — Z12.39 ENCOUNTER FOR OTHER SCREENING FOR MALIGNANT NEOPLASM OF BREAST: ICD-10-CM

## 2025-02-13 PROCEDURE — 77067 SCR MAMMO BI INCL CAD: CPT

## 2025-02-21 ENCOUNTER — OFFICE VISIT (OUTPATIENT)
Dept: PAIN MEDICINE | Facility: HOSPITAL | Age: 55
End: 2025-02-21
Payer: OTHER GOVERNMENT

## 2025-02-21 VITALS
HEIGHT: 71 IN | HEART RATE: 121 BPM | RESPIRATION RATE: 16 BRPM | WEIGHT: 220 LBS | SYSTOLIC BLOOD PRESSURE: 165 MMHG | OXYGEN SATURATION: 98 % | BODY MASS INDEX: 30.8 KG/M2 | TEMPERATURE: 97.8 F | DIASTOLIC BLOOD PRESSURE: 92 MMHG

## 2025-02-21 DIAGNOSIS — Z79.891 ENCOUNTER FOR LONG-TERM USE OF OPIATE ANALGESIC: Primary | ICD-10-CM

## 2025-02-21 DIAGNOSIS — Z79.899 MEDICATION MANAGEMENT: ICD-10-CM

## 2025-02-21 DIAGNOSIS — M96.1 POSTLAMINECTOMY SYNDROME OF LUMBAR REGION: ICD-10-CM

## 2025-02-21 DIAGNOSIS — M54.16 CHRONIC LUMBAR RADICULOPATHY: ICD-10-CM

## 2025-02-21 DIAGNOSIS — M48.062 LUMBAR STENOSIS WITH NEUROGENIC CLAUDICATION: ICD-10-CM

## 2025-02-21 PROCEDURE — 99214 OFFICE O/P EST MOD 30 MIN: CPT | Performed by: NURSE PRACTITIONER

## 2025-02-21 PROCEDURE — 3008F BODY MASS INDEX DOCD: CPT | Performed by: NURSE PRACTITIONER

## 2025-02-21 PROCEDURE — 1036F TOBACCO NON-USER: CPT | Performed by: NURSE PRACTITIONER

## 2025-02-21 RX ORDER — HYDROCODONE BITARTRATE AND ACETAMINOPHEN 5; 325 MG/1; MG/1
1 TABLET ORAL 3 TIMES DAILY PRN
Qty: 90 TABLET | Refills: 0 | Status: SHIPPED | OUTPATIENT
Start: 2025-02-21

## 2025-02-21 ASSESSMENT — ENCOUNTER SYMPTOMS
NEUROLOGICAL NEGATIVE: 1
ENDOCRINE NEGATIVE: 1
GASTROINTESTINAL NEGATIVE: 1
ALLERGIC/IMMUNOLOGIC NEGATIVE: 1
CONSTITUTIONAL NEGATIVE: 1
EYES NEGATIVE: 1
ARTHRALGIAS: 1
MYALGIAS: 1
BACK PAIN: 1
RESPIRATORY NEGATIVE: 1
PSYCHIATRIC NEGATIVE: 1
JOINT SWELLING: 0
CARDIOVASCULAR NEGATIVE: 1
NECK STIFFNESS: 0
NECK PAIN: 0

## 2025-02-21 ASSESSMENT — PAIN SCALES - GENERAL: PAINLEVEL_OUTOF10: 8

## 2025-02-21 NOTE — PROGRESS NOTES
Subjective   Rebecca Saenz is a 54 y.o. female who is here for follow-up visit and medication refill.  Patient is ambulatory without assistive device.  Gait is steady.  She herself.    Patient continues to have chronic lower back pain that goes down to her right leg.  She also has pain to her neck.  She rates her pain as 8 out of 10.  Pain can be constant or comes and goes depending on her activities.  She reports most of the the time it is constant.  She describes her pain as sharp, shooting, spastic, stabbing, tender and throbbing kind of pain.  She has numbness, decreased sensation down to her right leg.  She has some weakness to this leg.  She denies increasing leg weakness or change in balance.  She denies bowel or bladder incontinence.  She denies recent falls, injuries, or ER visits.  There has been no changes since she was last seen.    Patient continues to take Flexeril and gabapentin prescribed by her other provider.  She takes Norco 1 pill 3 times a day and last dose was this morning.  Patient forgot to bring her pill bottle for pill count.  Patient went home and came back for a pill count.  She has 11 pills left during actual count.  Patient is on Effexor for her mood but did not help with her pain.  I encouraged her to seek psych provider and she voiced understanding.  She denies side effects to her medications.    I reviewed previous notes and imaging.  I discussed the plan of care.  I will see her for follow-up visit after her surgery.  She is going to have back surgery on March 18, 2025.  She reports that would be her seventh surgery.  Patient will call the office for follow-up.  Questions were answered during this encounter.    I have reviewed the OARRS report.  Last fill date of [place] was on 1/24/2025 where she received 90 tabs.  No suspicious activities.  Toxicology reports based on previous drug screens were consistent with no compliance issues.    The patient was counseled regarding  diagnostic results, instructions for management, risk factor reductions, risks and benefits of treatment options and importance of compliance with treatment.    9/17/2024: C5-C6, C6-C7 anterior cervical discectomy and disc replacement with Dr. Stephen.  ----------------  PROCEDURES:    6/25/2024: Cervical BERENICE #1  4/30/2024: Left L1-L2 and L2-L3 TFESI  2/20/2024: Caudal BERENICE  1/2/2024: Left L1-L2 and L2-L3 TFESI  --------    OARRS:  Nilsa Hare, APRN-CNP, DNP on 2/21/2025  9:40 AM  I have personally reviewed the OARRS report for Rebecca Saenz. I have considered the risks of abuse, dependence, addiction and diversion and I believe that it is clinically appropriate for Rebecca Saenz to be prescribed this medication    Review of Systems   Constitutional: Negative.    HENT: Negative.     Eyes: Negative.    Respiratory: Negative.     Cardiovascular: Negative.    Gastrointestinal: Negative.    Endocrine: Negative.    Genitourinary: Negative.    Musculoskeletal:  Positive for arthralgias, back pain and myalgias. Negative for gait problem, joint swelling, neck pain and neck stiffness.   Skin: Negative.    Allergic/Immunologic: Negative.    Neurological: Negative.    Psychiatric/Behavioral: Negative.            BP (!) 165/92 (BP Location: Right arm, Patient Position: Sitting, BP Cuff Size: Adult)   Pulse (!) 121   Temp 36.6 °C (97.8 °F) (Temporal)   Resp 16   Wt 99.8 kg (220 lb)   SpO2 98%  Body mass index is 30.68 kg/m².      Objective       Past Medical History  She has a past medical history of Arthritis, Cataract (2018), Depression, Headache, Hyperlipidemia, and Joint pain.    Surgical History  Past Surgical History:   Procedure Laterality Date    BACK SURGERY  05/19/2018    Back Surgery    CERVICAL DISCECTOMY  09/2024    C5-C7, CERVICAL DISC REPLACEMENT    EYE SURGERY  05/19/2018    Eye Surgery    HYSTERECTOMY  1998        Social History  She reports that she has quit smoking. Her smoking use included  cigarettes. She has a 22.5 pack-year smoking history. She has never used smokeless tobacco. She reports that she does not drink alcohol and does not use drugs.    Family History  Family History   Problem Relation Name Age of Onset    No Known Problems Mother      Arthritis Father Damaris     Cancer Father [place]         Allergies  Sulfamethoxazole    MEDICATIONS:    Current Outpatient Medications:     ascorbic acid (Vitamin C) 500 mg tablet, Take 1 tablet (500 mg) by mouth once daily., Disp: , Rfl:     calcium carbonate (CALCIUM 500 ORAL), Take by mouth., Disp: , Rfl:     cholecalciferol (Vitamin D3) 25 MCG (1000 UT) tablet, Take 1 tablet (25 mcg) by mouth once daily., Disp: , Rfl:     cyanocobalamin (Vitamin B-12) 1,000 mcg tablet, Take 100 mcg by mouth once daily., Disp: , Rfl:     cyclobenzaprine (Flexeril) 10 mg tablet, Take 1 tablet (10 mg) by mouth 3 times a day as needed for muscle spasms., Disp: 60 tablet, Rfl: 2    gabapentin (Neurontin) 600 mg tablet, Take 2 tablets (1,200 mg) by mouth 3 times a day., Disp: 180 tablet, Rfl: 0    garlic 500 mg capsule, Take by mouth., Disp: , Rfl:     HYDROcodone-acetaminophen (Norco) 5-325 mg tablet, Take 1 tablet by mouth 3 times a day as needed for severe pain (7 - 10)., Disp: 90 tablet, Rfl: 0    methocarbamol (Robaxin) 750 mg tablet, Take 1 tablet (750 mg) by mouth 3 times a day., Disp: , Rfl:     rOPINIRole (Requip) 1 mg tablet, Take 1 tablet (1 mg) by mouth once daily at bedtime., Disp: 90 tablet, Rfl: 1    venlafaxine XR (Effexor-XR) 37.5 mg 24 hr capsule, 1 capsule (37.5 mg)., Disp: , Rfl:     naloxone (Narcan) 4 mg/0.1 mL nasal spray, Administer 1 spray (4 mg) into affected nostril(s) if needed for opioid reversal. CALL 911 first. May repeat every 2-3 minutes if needed, alternating nostrils, until medical assistance becomes available., Disp: 2 each, Rfl: 0    Physical Exam  Vitals and nursing note reviewed.   HENT:      Head: Normocephalic.      Nose: Nose normal.  "  Eyes:      Extraocular Movements: Extraocular movements intact.      Conjunctiva/sclera: Conjunctivae normal.      Pupils: Pupils are equal, round, and reactive to light.   Cardiovascular:      Rate and Rhythm: Normal rate and regular rhythm.   Pulmonary:      Effort: Pulmonary effort is normal.      Breath sounds: Normal breath sounds.   Genitourinary:     Comments: Deferred  Musculoskeletal:         General: Tenderness present. No swelling, deformity or signs of injury.      Cervical back: No rigidity or tenderness.      Right lower leg: No edema.      Left lower leg: No edema.      Comments: \"For full disclosure, patient was asked to pull up their garment to properly visualize the spine. This is done by the patient without me touching their garment.  The spine/joints were examined using gloves.\"    Negative leg raise  Positive for paraspinal tenderness at the lumbar region bilaterally at L4-L5, L5-S1 with slight rotation.  Positive for tactile hyperesthesia.  With nonspecific radicular symptoms.  Presence of a long scar at the lumbar spine from previous surgery.  BUE 5/5, BLE 4/5.   Skin:     General: Skin is warm and dry.   Neurological:      General: No focal deficit present.      Mental Status: She is alert and oriented to person, place, and time.   Psychiatric:         Mood and Affect: Mood normal.         Behavior: Behavior normal.            Pain Management Panel  More data exists         Latest Ref Rng & Units 1/24/2025 8/6/2024   Pain Management Panel   Amphetamine Screen, Urine Presumptive Negative Presumptive Negative  Presumptive Negative    Barbiturate Screen, Urine Presumptive Negative Presumptive Negative  Presumptive Negative    Codeine IgE <50 ng/mL <50  <50    Hydromorphone Urine <25 ng/mL 127  200    Morphine  <50 ng/mL <50  <50           Assessment/Plan   Problem List Items Addressed This Visit             ICD-10-CM    Chronic lumbar radiculopathy M54.16    Relevant Medications    " HYDROcodone-acetaminophen (Norco) 5-325 mg tablet    Lumbar stenosis with neurogenic claudication M48.062    Relevant Medications    HYDROcodone-acetaminophen (Norco) 5-325 mg tablet    Postlaminectomy syndrome of lumbar region M96.1    Relevant Medications    HYDROcodone-acetaminophen (Norco) 5-325 mg tablet    Encounter for long-term use of opiate analgesic - Primary Z79.891    Relevant Medications    HYDROcodone-acetaminophen (Norco) 5-325 mg tablet     Other Visit Diagnoses         Codes    Medication management     Z79.899                Plan/Follow-up Instructions:    Continue with your prescribed medications.  Do not take sedating medications together.    Continue taking Flexeril and gabapentin as prescribed by her other provider.    I sent 90 tabs of [place] to your pharmacy.  Continue taking 1 pill 3 times a day for pain relief.  Do not take sedating medications together.    Call the office for your follow-up visit after your surgery.    ---Risks and side effects of chronic opioid therapy, including but not limited to tolerance, dependence, constipation, hyperalgesia, cognitive side effects, addiction, and possible death due to overuse and or misuse, were discussed.   Medications, when co-administered with other sedative agents, including but not limited to alcohol, benzodiazepines, sedatives or hypnotics, and illegal drugs, could pose life-threatening consequences, including death.   Such medication could impact if you have obstructive sleep apnea. I recommend that you continue using apnea devices if ordered by other physicians.   Being compliant with the treatment plan and the terms of the opioid agreement to effectively and safely treat the pain is important.  Being responsible with the medications and taking these only as prescribed, never in excess, and never for reasons other than pain reduction.   Keep the medications safe and locked away from children and other adults, as well as disposal methods and  options. The patient understood the risks and instructions. ---      Time     Prep time on date of the patient encounter: 2 minutes  Time spent directly with patient/family/caregiver: 30 minutes  Documentation time: 2 minutes  Total time on date of patient encounter: 34 minutes      --------------  Disclaimer: This note was created using voice recognition software. It was not corrected for typographical or grammatical errors, inadvertent word insertion, or any unintended errors. Please feel free to contact me for clarification.  --------------      Nilsa Hare, AVANI, APRN, FNP-C   [facility]  Office #: [phone]  Fax # [phone]

## 2025-02-21 NOTE — PATIENT INSTRUCTIONS
Continue with your prescribed medications.  Do not take sedating medications together.    Continue taking Flexeril and gabapentin as prescribed by her other provider.    I sent 90 tabs of [place] to your pharmacy.  Continue taking 1 pill 3 times a day for pain relief.  Do not take sedating medications together.    Call the office for your follow-up visit after your surgery.    ---Risks and side effects of chronic opioid therapy, including but not limited to tolerance, dependence, constipation, hyperalgesia, cognitive side effects, addiction, and possible death due to overuse and or misuse, were discussed.   Medications, when co-administered with other sedative agents, including but not limited to alcohol, benzodiazepines, sedatives or hypnotics, and illegal drugs, could pose life-threatening consequences, including death.   Such medication could impact if you have obstructive sleep apnea. I recommend that you continue using apnea devices if ordered by other physicians.   Being compliant with the treatment plan and the terms of the opioid agreement to effectively and safely treat the pain is important.  Being responsible with the medications and taking these only as prescribed, never in excess, and never for reasons other than pain reduction.   Keep the medications safe and locked away from children and other adults, as well as disposal methods and options. The patient understood the risks and instructions. ---

## 2025-02-21 NOTE — PROGRESS NOTES
Date of the last Controlled Substance Agreement: 1/24/2025       Last urine drug screening date/ordered today: 1/24/2025     Results of last screen: As expected      Last opioid risk screening date/ordered today: 1/24/2025      Pain Scale Screening:   Pain Assessment and Documentation Tool (PADT)   Date of Assessment: 2/21/2025  Analgesia:   Patient reports her pain level on average during the past week is 6on a 0 - 10 scale.   Patient reports that her pain level at its worst during the past week was 8 on a 0 -10 scale.   50% of pain has been relieved during the past week per patient   Patient states that the amount of pain relief she is now obtaining from her current pain reliever(s) is enough to make a real difference in her life.   Query to clinician: Is the patient's pain relief clinically significant? yes  Activities of Daily Living:   Physical functioning: better  Family relationships: unchanged  Social relationships: unchanged  Mood: unchanged  Sleep patterns: better  Overall functioning: worse  Adverse Events: No, Rebecca Saenz is not experiencing side effects from current pain reliever.  Patients overall severity of side effect:none  Specific Analgesic Plan: Continue present regimen.

## 2025-02-25 ENCOUNTER — HOSPITAL ENCOUNTER (OUTPATIENT)
Dept: RADIOLOGY | Facility: CLINIC | Age: 55
Discharge: HOME | End: 2025-02-25
Payer: OTHER GOVERNMENT

## 2025-02-25 DIAGNOSIS — R92.8 ABNORMAL MAMMOGRAM: ICD-10-CM

## 2025-02-25 PROCEDURE — 76982 USE 1ST TARGET LESION: CPT | Mod: RT

## 2025-02-25 PROCEDURE — 76642 ULTRASOUND BREAST LIMITED: CPT | Mod: RT

## 2025-04-07 ENCOUNTER — APPOINTMENT (OUTPATIENT)
Dept: RADIOLOGY | Facility: HOSPITAL | Age: 55
End: 2025-04-07
Payer: OTHER GOVERNMENT

## 2025-04-07 ENCOUNTER — HOSPITAL ENCOUNTER (EMERGENCY)
Facility: HOSPITAL | Age: 55
Discharge: LEFT BEFORE DISCHARGE | End: 2025-04-07
Attending: EMERGENCY MEDICINE
Payer: OTHER GOVERNMENT

## 2025-04-07 VITALS
TEMPERATURE: 97.5 F | HEIGHT: 71 IN | DIASTOLIC BLOOD PRESSURE: 98 MMHG | WEIGHT: 220 LBS | BODY MASS INDEX: 30.8 KG/M2 | SYSTOLIC BLOOD PRESSURE: 149 MMHG | RESPIRATION RATE: 16 BRPM | HEART RATE: 99 BPM | OXYGEN SATURATION: 100 %

## 2025-04-07 DIAGNOSIS — M96.1 POSTLAMINECTOMY SYNDROME OF LUMBAR REGION: Primary | ICD-10-CM

## 2025-04-07 DIAGNOSIS — Z53.29 LEFT AGAINST MEDICAL ADVICE: ICD-10-CM

## 2025-04-07 DIAGNOSIS — M54.16 CHRONIC LUMBAR RADICULOPATHY: ICD-10-CM

## 2025-04-07 LAB
ALBUMIN SERPL BCP-MCNC: 4.9 G/DL (ref 3.4–5)
ALP SERPL-CCNC: 109 U/L (ref 33–110)
ALT SERPL W P-5'-P-CCNC: 12 U/L (ref 7–45)
ANION GAP SERPL CALC-SCNC: 12 MMOL/L (ref 10–20)
APPEARANCE UR: CLEAR
AST SERPL W P-5'-P-CCNC: 15 U/L (ref 9–39)
BASOPHILS # BLD AUTO: 0.05 X10*3/UL (ref 0–0.1)
BASOPHILS NFR BLD AUTO: 0.6 %
BILIRUB SERPL-MCNC: 0.5 MG/DL (ref 0–1.2)
BILIRUB UR STRIP.AUTO-MCNC: NEGATIVE MG/DL
BUN SERPL-MCNC: 13 MG/DL (ref 6–23)
CALCIUM SERPL-MCNC: 9.8 MG/DL (ref 8.6–10.3)
CHLORIDE SERPL-SCNC: 102 MMOL/L (ref 98–107)
CO2 SERPL-SCNC: 29 MMOL/L (ref 21–32)
COLOR UR: NORMAL
CREAT SERPL-MCNC: 1.04 MG/DL (ref 0.5–1.05)
EGFRCR SERPLBLD CKD-EPI 2021: 63 ML/MIN/1.73M*2
EOSINOPHIL # BLD AUTO: 0.18 X10*3/UL (ref 0–0.7)
EOSINOPHIL NFR BLD AUTO: 2.3 %
ERYTHROCYTE [DISTWIDTH] IN BLOOD BY AUTOMATED COUNT: 14.1 % (ref 11.5–14.5)
GLUCOSE SERPL-MCNC: 100 MG/DL (ref 74–99)
GLUCOSE UR STRIP.AUTO-MCNC: NORMAL MG/DL
HCT VFR BLD AUTO: 39.5 % (ref 36–46)
HGB BLD-MCNC: 12.6 G/DL (ref 12–16)
IMM GRANULOCYTES # BLD AUTO: 0.02 X10*3/UL (ref 0–0.7)
IMM GRANULOCYTES NFR BLD AUTO: 0.3 % (ref 0–0.9)
KETONES UR STRIP.AUTO-MCNC: NEGATIVE MG/DL
LEUKOCYTE ESTERASE UR QL STRIP.AUTO: NEGATIVE
LYMPHOCYTES # BLD AUTO: 3.38 X10*3/UL (ref 1.2–4.8)
LYMPHOCYTES NFR BLD AUTO: 43.4 %
MCH RBC QN AUTO: 29.3 PG (ref 26–34)
MCHC RBC AUTO-ENTMCNC: 31.9 G/DL (ref 32–36)
MCV RBC AUTO: 92 FL (ref 80–100)
MONOCYTES # BLD AUTO: 0.43 X10*3/UL (ref 0.1–1)
MONOCYTES NFR BLD AUTO: 5.5 %
NEUTROPHILS # BLD AUTO: 3.73 X10*3/UL (ref 1.2–7.7)
NEUTROPHILS NFR BLD AUTO: 47.9 %
NITRITE UR QL STRIP.AUTO: NEGATIVE
NRBC BLD-RTO: 0 /100 WBCS (ref 0–0)
PH UR STRIP.AUTO: 5 [PH]
PLATELET # BLD AUTO: 447 X10*3/UL (ref 150–450)
POTASSIUM SERPL-SCNC: 4.1 MMOL/L (ref 3.5–5.3)
PROT SERPL-MCNC: 8.4 G/DL (ref 6.4–8.2)
PROT UR STRIP.AUTO-MCNC: NEGATIVE MG/DL
RBC # BLD AUTO: 4.3 X10*6/UL (ref 4–5.2)
RBC # UR STRIP.AUTO: NEGATIVE MG/DL
SODIUM SERPL-SCNC: 139 MMOL/L (ref 136–145)
SP GR UR STRIP.AUTO: 1.02
UROBILINOGEN UR STRIP.AUTO-MCNC: NORMAL MG/DL
WBC # BLD AUTO: 7.8 X10*3/UL (ref 4.4–11.3)

## 2025-04-07 PROCEDURE — 2500000004 HC RX 250 GENERAL PHARMACY W/ HCPCS (ALT 636 FOR OP/ED)

## 2025-04-07 PROCEDURE — 99285 EMERGENCY DEPT VISIT HI MDM: CPT | Performed by: EMERGENCY MEDICINE

## 2025-04-07 PROCEDURE — 80053 COMPREHEN METABOLIC PANEL: CPT | Performed by: PHYSICIAN ASSISTANT

## 2025-04-07 PROCEDURE — 51798 US URINE CAPACITY MEASURE: CPT

## 2025-04-07 PROCEDURE — 72158 MRI LUMBAR SPINE W/O & W/DYE: CPT

## 2025-04-07 PROCEDURE — 96374 THER/PROPH/DIAG INJ IV PUSH: CPT

## 2025-04-07 PROCEDURE — 72158 MRI LUMBAR SPINE W/O & W/DYE: CPT | Performed by: RADIOLOGY

## 2025-04-07 PROCEDURE — 36415 COLL VENOUS BLD VENIPUNCTURE: CPT | Performed by: PHYSICIAN ASSISTANT

## 2025-04-07 PROCEDURE — 85025 COMPLETE CBC W/AUTO DIFF WBC: CPT | Performed by: PHYSICIAN ASSISTANT

## 2025-04-07 PROCEDURE — 81003 URINALYSIS AUTO W/O SCOPE: CPT | Performed by: PHYSICIAN ASSISTANT

## 2025-04-07 PROCEDURE — A9575 INJ GADOTERATE MEGLUMI 0.1ML: HCPCS | Performed by: PHYSICIAN ASSISTANT

## 2025-04-07 PROCEDURE — 2500000004 HC RX 250 GENERAL PHARMACY W/ HCPCS (ALT 636 FOR OP/ED): Performed by: PHYSICIAN ASSISTANT

## 2025-04-07 PROCEDURE — 2550000001 HC RX 255 CONTRASTS: Performed by: PHYSICIAN ASSISTANT

## 2025-04-07 PROCEDURE — 96375 TX/PRO/DX INJ NEW DRUG ADDON: CPT

## 2025-04-07 RX ORDER — DEXAMETHASONE SODIUM PHOSPHATE 10 MG/ML
10 INJECTION INTRAMUSCULAR; INTRAVENOUS ONCE
Status: COMPLETED | OUTPATIENT
Start: 2025-04-07 | End: 2025-04-07

## 2025-04-07 RX ORDER — GADOTERATE MEGLUMINE 376.9 MG/ML
20 INJECTION INTRAVENOUS
Status: COMPLETED | OUTPATIENT
Start: 2025-04-07 | End: 2025-04-07

## 2025-04-07 RX ORDER — KETOROLAC TROMETHAMINE 30 MG/ML
30 INJECTION, SOLUTION INTRAMUSCULAR; INTRAVENOUS ONCE
Status: COMPLETED | OUTPATIENT
Start: 2025-04-07 | End: 2025-04-07

## 2025-04-07 RX ORDER — MORPHINE SULFATE 4 MG/ML
INJECTION, SOLUTION INTRAMUSCULAR; INTRAVENOUS
Status: COMPLETED
Start: 2025-04-07 | End: 2025-04-07

## 2025-04-07 RX ORDER — MORPHINE SULFATE 4 MG/ML
4 INJECTION, SOLUTION INTRAMUSCULAR; INTRAVENOUS ONCE
Status: COMPLETED | OUTPATIENT
Start: 2025-04-07 | End: 2025-04-07

## 2025-04-07 RX ADMIN — MORPHINE SULFATE 4 MG: 4 INJECTION, SOLUTION INTRAMUSCULAR; INTRAVENOUS at 16:38

## 2025-04-07 RX ADMIN — GADOTERATE MEGLUMINE 20 ML: 376.9 INJECTION INTRAVENOUS at 17:15

## 2025-04-07 RX ADMIN — DEXAMETHASONE SODIUM PHOSPHATE 10 MG: 10 INJECTION INTRAMUSCULAR; INTRAVENOUS at 16:38

## 2025-04-07 RX ADMIN — KETOROLAC TROMETHAMINE 30 MG: 30 INJECTION, SOLUTION INTRAMUSCULAR at 16:38

## 2025-04-07 ASSESSMENT — COLUMBIA-SUICIDE SEVERITY RATING SCALE - C-SSRS
6. HAVE YOU EVER DONE ANYTHING, STARTED TO DO ANYTHING, OR PREPARED TO DO ANYTHING TO END YOUR LIFE?: NO
2. HAVE YOU ACTUALLY HAD ANY THOUGHTS OF KILLING YOURSELF?: NO
1. IN THE PAST MONTH, HAVE YOU WISHED YOU WERE DEAD OR WISHED YOU COULD GO TO SLEEP AND NOT WAKE UP?: NO

## 2025-04-07 ASSESSMENT — PAIN DESCRIPTION - DESCRIPTORS: DESCRIPTORS: DISCOMFORT

## 2025-04-07 ASSESSMENT — PAIN SCALES - GENERAL
PAINLEVEL_OUTOF10: 5 - MODERATE PAIN
PAINLEVEL_OUTOF10: 7

## 2025-04-07 ASSESSMENT — PAIN DESCRIPTION - PAIN TYPE: TYPE: ACUTE PAIN

## 2025-04-07 ASSESSMENT — PAIN - FUNCTIONAL ASSESSMENT: PAIN_FUNCTIONAL_ASSESSMENT: 0-10

## 2025-04-07 ASSESSMENT — PAIN DESCRIPTION - ORIENTATION: ORIENTATION: LOWER

## 2025-04-07 ASSESSMENT — PAIN DESCRIPTION - LOCATION: LOCATION: BACK

## 2025-04-07 NOTE — ED TRIAGE NOTES
Pt had back surgery 1 month ago, laminectomy on L1. Pt states having 8 previous back surgeries. Per pt, now she is having loss of control of her bowels now which happened intermittently before her surgery, but now is happening often for the last week. Pt is also having low back pain that radiates into L leg.

## 2025-04-08 LAB — HOLD SPECIMEN: NORMAL

## 2025-04-08 NOTE — ED PROVIDER NOTES
HPI   Chief Complaint   Patient presents with    Back Pain     Pt had back surgery 1 month ago, laminectomy on L1. Pt states having 8 previous back surgeries. Per pt, now she is having loss of control of her bowels now which happened intermittently before her surgery, but now is happening often for the last week. Pt is also having low back pain that radiates into L leg.     Leg Pain       History of present illness:  54-year-old female presents emergency room for complaints of lower back pain with radiation into her leg.  The patient states that she has chronic back pain and has had a total of 8 surgeries on her lumbar spine.  She states that she has multiple pieces of hardware in her back and has also had cystectomy in the back for chronic back pain.  She states her most recent surgery was back about a month ago and it was between T12 and L1 and she states it was a revision of a previous surgery and had a laminectomy performed as well.  She states that she has been doing well at home on the pain medications that she has been given but she states recently she has began developing sciatica in her left leg which is uncommon is usually just on the right side.  Over the past week the pain is developed though and has been gotten worse sciatic on the left side in addition to this she has also had what she describes as fecal incontinence.  She states that at times she would not realize it but she apparently is passing rectal discharge or rectal fluid and she states that she will not feel passing.  She states that at times when she is defecating she cannot feel anything and she will sometimes feel like she is having numbness across her pelvis as well.  She states it comes and episodes that is not consistent.  She states that the pain seems to be getting worse and that is what she was mostly concerned about.  She states she has been urinating on a regular basis no difficulty.  She denies any other symptoms at this  time.    Social history: Negative for alcohol and drug use.    Review of systems:   Gen.: No weight loss, fatigue, anorexia, insomnia, fever.   Eyes: No vision loss, double vision  ENT: No pharyngitis, neck pain  Cardiac: No chest pain, palpitations, syncope, near syncope.   Pulmonary: No shortness of breath, cough, hemoptysis.   Heme/lymph: No swollen glands, fever, bleeding.   GI: No abdominal pain, change in bowel habits, melena, hematemesis, hematochezia, nausea, vomiting, diarrhea.   : No discharge, dysuria, frequency, urgency, hematuria.   Musculoskeletal: No  joint pain, joint swelling.   Skin: No rashes.   Review of systems is otherwise negative unless stated above or in history of present illness.        Physical exam:  General: Vitals noted, no distress. Afebrile.   EENT: No lymphadenopathy appreciated   Cardiac: Regular, rate, rhythm, no murmur.   Pulmonary: Lungs clear bilaterally with good aeration. No adventitious breath sounds.   Abdomen: Soft, nonsurgical. Nontender. No peritoneal signs. Normoactive bowel sounds.    Rectum: Good rectal tone present, sensation present on exam, no loss of sensation present across the pelvis  Extremities: No peripheral edema.  5 out of 5 strength in flexion extension of the ankles bilaterally dermatomes are intact at the level of the ankles all the way throughout full flexion extension of both knees are intact  Skin: No rash.   Neuro: No focal neurologic deficits      Medical decision making:   Testing: Urinalysis unremarkable CBC CMP unremarkable MRI of the lumbar spine shows some canal stenosis but no obvious signs of any cord compression as interpreted by radiology  Treatment: Toradol 30 mg IV Decadron 10 mg IV, morphine 4 mg IV Zofran 4 mg IV  Reevaluation: Patient initially reported significant improvement in symptoms but still later states that she feels like the pain is getting worse again and she is still having sciatica.  Plan: 54-year-old female presents  emergency room for complaints of lower back pain with radiation into her leg.  The patient states that she has chronic back pain and has had a total of 8 surgeries on her lumbar spine.  She states that she has multiple pieces of hardware in her back and has also had cystectomy in the back for chronic back pain.  She states her most recent surgery was back about a month ago and it was between T12 and L1 and she states it was a revision of a previous surgery and had a laminectomy performed as well.  She states that she has been doing well at home on the pain medications that she has been given but she states recently she has began developing sciatica in her left leg which is uncommon is usually just on the right side.  Over the past week the pain is developed though and has been gotten worse sciatic on the left side in addition to this she has also had what she describes as fecal incontinence.  She states that at times she would not realize it but she apparently is passing rectal discharge or rectal fluid and she states that she will not feel passing.  She states that at times when she is defecating she cannot feel anything and she will sometimes feel like she is having numbness across her pelvis as well.  She states it comes and episodes that is not consistent.  She states that the pain seems to be getting worse and that is what she was mostly concerned about.  She states she has been urinating on a regular basis no difficulty.  She denies any other symptoms at this time. Extremities: No peripheral edema.  5 out of 5 strength in flexion extension of the ankles bilaterally dermatomes are intact at the level of the ankles all the way throughout full flexion extension of both knees are intact.  Dermatomes are all intact at the level ankles bilaterally.  The patient has full sensation in all toes at this time and good cap refill in all toes at this time.  I explained to the patient the MRI results and explained to her  that I did speak with Mary who is the on-call nurse practitioner for neurosurgery at [facility] we discussed the case at length.  They advised me that if the patient was having persistent pain or any worsening symptoms that she should be transferred at this time.  I was in agreement with this plan and I went to speak with the patient who was now found laying on her right side of her left leg stretched out over top of a table at bedside in the room.  Has the patient what was going on she explained to me the pain that gotten worse that she felt this was the best position of comfort.  I explained to her my concerns about a possible nerve impingement and her symptoms and explained to her that I felt that it be best for her to be transferred back to [facility] where she had the surgery and where her surgeons adds that she could be evaluated further.  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  I explained to the patient that I did not feel that this was the best course of action for her medical help and explained to her that if she did indeed have nerve root impingement or possibly early cauda equina that it would need to be surgically intervened on and that failure to do so could lead to permanent paralysis or permanent nerve loss.  She verbalized understanding of this but once again explained to me that she did not have anyone available to help with her current situation at home.  I inquired if there is possibly a neighbor or friend or family member or even a parishioner they could possibly go to her home to take care of the dogs and checking on them to make sure that they were cared for that was not her as I explained to her again my concerns.  She once again declined and explained to me that she would need to sign out AMA and that she did not want  "to be transferred at this time and that if she felt that her symptoms got worse she would \"find a way to get to [place]\".  I explained to her that I would like to the attending physician also speak with her who also explained to her as well's concerns at this time but the patient was adamant about signing out AMA.  I strongly encouraged her to please return if she changed her mind nor proceed to [place] at this time.  She signed AMA paperwork at this time.  Impression:   1.  Lumbar radiculopathy            History provided by:  Patient   used: No            Patient History   Past Medical History:   Diagnosis Date    Arthritis     Cataract 2018    Depression     Headache     Hyperlipidemia     Joint pain      Past Surgical History:   Procedure Laterality Date    BACK SURGERY  05/19/2018    Back Surgery    CERVICAL DISCECTOMY  09/2024    C5-C7, CERVICAL DISC REPLACEMENT    EYE SURGERY  05/19/2018    Eye Surgery    HYSTERECTOMY  1998     Family History   Problem Relation Name Age of Onset    No Known Problems Mother      Arthritis Father Damaris     Cancer Father Damaris      Social History     Tobacco Use    Smoking status: Former     Current packs/day: 1.50     Average packs/day: 1.5 packs/day for 15.0 years (22.5 ttl pk-yrs)     Types: Cigarettes    Smokeless tobacco: Never   Vaping Use    Vaping status: Never Used   Substance Use Topics    Alcohol use: Never    Drug use: Never       Physical Exam   ED Triage Vitals   Temperature Heart Rate Respirations BP   04/07/25 1344 04/07/25 1344 04/07/25 1344 04/07/25 1344   36.3 °C (97.4 °F) (!) 117 18 127/82      Pulse Ox Temp Source Heart Rate Source Patient Position   04/07/25 1344 04/07/25 1344 04/07/25 1624 04/07/25 1624   99 % Temporal Monitor Lying      BP Location FiO2 (%)     04/07/25 1624 --     Left arm        Physical Exam      ED Course & MDM   Diagnoses as of 04/07/25 2108   Postlaminectomy syndrome of lumbar region   Chronic lumbar " radiculopathy   Left against medical advice                 No data recorded     Bernarda Coma Scale Score: 15 (04/07/25 1503 : Shani Guaman RN)                           Medical Decision Making      Procedure  Procedures    I was present with the PA-C who participated in the documentation of this note. I have personally seen and re-examined the patient and performed the medical decision-making components (assessment and plan of care). I have reviewed the PA-C documentation and verified the findings in the note as written with additions or exceptions as stated in the body of this note.    MR lumbar spine w and wo IV contrast   Final Result   1. The AP diameter spinal canal is most narrowed at L1-L2 where it   measures 10 mm in AP diameter.   2. A small amount of fluid remains in the operative site posterior   soft tissues. This is to be expected.        MACRO:   None        Signed by: Tomy Carlisle 4/7/2025 5:53 PM   Dictation workstation:   [id number]            HPI:  54-year-old female with 8 lumbar surgeries with post laminectomy syndrome presents with low back pain radiating into her legs.  She typically has pain radiating into her right leg and is now noticing pain radiating to the left leg.  There is some vague fecal incontinence.  At times, she is able to control her stools.  Other times, she states that there is a small amount of rectal discharge or fluid that she does not feel she is passing.  Periodic pelvic paresthesias.  She can walk without difficulty.  Exam: Heart regular.  Lungs clear.  Abdomen soft.  PA-C performed rectal exam with good rectal tone.  Sensory addition intact to the pelvic area.  5/5 flexion and extension to the lower legs.  She can move all extremities with purpose.  Ambulatory with steady gait.  MDM: She had a CT of her lumbar spine performed which showed spinal canal narrowing at L1-L2 at 10 mm in AP diameter and posterior soft tissue fluid expected after recent back  surgery.  Case discussed with Dr. Stephen's team at [facility] who offered hospitalization at that facility.  Patient declines.  She wishes to go home.  She understands risks of leaving including permanent disability, paralysis, death.  She was encouraged to return anytime to receive the care she refused today.  She left the emergency department AGAINST MEDICAL ADVICE.  DO Uri Smith DO  04/09/25 0749       Aj Aleman PA-C  04/09/25 0926

## 2025-05-21 ENCOUNTER — OFFICE VISIT (OUTPATIENT)
Dept: PAIN MEDICINE | Facility: HOSPITAL | Age: 55
End: 2025-05-21
Payer: OTHER GOVERNMENT

## 2025-05-21 ENCOUNTER — TELEPHONE (OUTPATIENT)
Dept: PAIN MEDICINE | Facility: HOSPITAL | Age: 55
End: 2025-05-21

## 2025-05-21 VITALS
TEMPERATURE: 98 F | BODY MASS INDEX: 30.8 KG/M2 | HEART RATE: 100 BPM | DIASTOLIC BLOOD PRESSURE: 84 MMHG | SYSTOLIC BLOOD PRESSURE: 133 MMHG | OXYGEN SATURATION: 100 % | HEIGHT: 71 IN | WEIGHT: 220 LBS | RESPIRATION RATE: 16 BRPM

## 2025-05-21 DIAGNOSIS — Z79.899 MEDICATION MANAGEMENT: ICD-10-CM

## 2025-05-21 DIAGNOSIS — M96.1 POSTLAMINECTOMY SYNDROME OF LUMBAR REGION: Primary | ICD-10-CM

## 2025-05-21 DIAGNOSIS — M54.16 CHRONIC LUMBAR RADICULOPATHY: ICD-10-CM

## 2025-05-21 PROCEDURE — 1036F TOBACCO NON-USER: CPT | Performed by: NURSE PRACTITIONER

## 2025-05-21 PROCEDURE — 3008F BODY MASS INDEX DOCD: CPT | Performed by: NURSE PRACTITIONER

## 2025-05-21 PROCEDURE — 99214 OFFICE O/P EST MOD 30 MIN: CPT | Performed by: NURSE PRACTITIONER

## 2025-05-21 RX ORDER — OXYCODONE HYDROCHLORIDE 5 MG/1
5 CAPSULE ORAL EVERY 6 HOURS PRN
COMMUNITY

## 2025-05-21 RX ORDER — DIAZEPAM ORAL SOLUTION (CONCENTRATE) 5 MG/ML
5 SOLUTION ORAL EVERY 8 HOURS PRN
COMMUNITY

## 2025-05-21 ASSESSMENT — PAIN SCALES - GENERAL: PAINLEVEL_OUTOF10: 7

## 2025-05-21 ASSESSMENT — ENCOUNTER SYMPTOMS
CONSTITUTIONAL NEGATIVE: 1
JOINT SWELLING: 0
NECK STIFFNESS: 0
GASTROINTESTINAL NEGATIVE: 1
BACK PAIN: 1
ENDOCRINE NEGATIVE: 1
ALLERGIC/IMMUNOLOGIC NEGATIVE: 1
CARDIOVASCULAR NEGATIVE: 1
ARTHRALGIAS: 1
RESPIRATORY NEGATIVE: 1
NECK PAIN: 0
EYES NEGATIVE: 1
MYALGIAS: 1
PSYCHIATRIC NEGATIVE: 1
NEUROLOGICAL NEGATIVE: 1

## 2025-05-21 NOTE — PATIENT INSTRUCTIONS
Continue with your prescribed medications.  Do not take sedating medications together.    ---------------    Your next appointment is with Dr. Ching in:    [facility]    For pain block/injection on: 6/3/2025, caudal epidural steroid injection #1    LOCAL    °You will receive a phone call the weekday before your appointment/procedure.   °Please KEEP your scheduled appointments.     °BRING your photo ID, insurance information, and a correct list of your home medications to EVERY visit.    °Please call our office at [phone] if you have any questions.     You will then be seen for a postprocedure follow-up in 4 to 6 weeks in [place].  ---------------

## 2025-05-21 NOTE — PROGRESS NOTES
Subjective   Rebecca Saenz is a 54 y.o. female who is here for follow-up visit.  Patient is ambulatory without assistive device.  Gait is steady.  She arrives by herself.    Patient continues to have chronic lower back pain that goes down to her legs with the left worse than the right.  She rates her pain as 7 out of 10.  Left leg pain is constant whereas right leg pain comes and goes.  She describes her pain as sharp, shooting, stabbing pain.  She has pins and needle sensation in her legs.  She reports left leg is something new since after she had surgery.  Before the surgery, it has always been her right leg that was bothering her.  She has hard time sitting down as it aggravates her pain.  She has some leg weakness but denies change in balance or near falling.  She currently denies bowel or bladder incontinence.  She denies recent falls or injuries.    Patient had L1-L2 laminectomy and facetectomies done on 3/11/2025 with Dr. Stephen at [facility].  She had few ED visits after her procedure due to adverse effects.  She had loss control of her bowel.  She had imaging done and they reviewed the results.  Patient reports that her surgeon wants her to follow-up with pain management for an injection.  She also has a referral for spinal stimulator.  Patient has history of L2 to L5 lumbar fusion with interbody cages at L3-L4 and L4-L5.    Patient is taking oxycodone 5 mg IR and Valium as needed prescribed by her surgeon.  She took oxycodone this morning.  Patient OARRS report, she had 21 tabs of oxycodone 5 mg on 5/9/2025 from her surgeon.  She [facility]  She is not taking the muscle relaxant.  She reports it was not working.  She continues to take Effexor for anxiety and depression.  She was doing physical therapy and continues to do her stretching exercises.    I reviewed previous notes, labs and imaging.  She had MRI of her lumbar spine that was on 4/7/2025 and I reviewed the  results.  I discussed plan of care including pharmacologic and joint intervention procedure.  I discussed caudal epidural block and she is in agreement to proceed to this procedure.  This is done under fluoroscopy.  [facility]. Questions were answered during this encounter.    I have reviewed the OARRS report.  Last fill date of Norco was on 2/22/2025 where she received 90 tabs.  No suspicious activities.  Toxicology reports based on previous drug screens were consistent with no compliance issues.    The patient was counseled regarding diagnostic results, instructions for management, risk factor reductions, risks and benefits of treatment options and importance of compliance with treatment.      9/17/2024: C5-C6, C6-C7 anterior cervical discectomy and disc replacement with Dr. Stephen.  ----------------  PROCEDURES:    6/25/2024: Cervical BERENICE #1  4/30/2024: Left L1-L2 and L2-L3 TFESI  2/20/2024: Caudal BERENICE  1/2/2024: Left L1-L2 and L2-L3 TFESI  --------    OARRS:  Nilsa Hare, APRN-CNP, DNP on 5/21/2025  8:26 AM  I have personally reviewed the OARRS report for Rebecca Saenz. I have considered the risks of abuse, dependence, addiction and diversion and I believe that it is clinically appropriate for Rebecca Saenz to be prescribed this medication    Review of Systems   Constitutional: Negative.    HENT: Negative.     Eyes: Negative.    Respiratory: Negative.     Cardiovascular: Negative.    Gastrointestinal: Negative.    Endocrine: Negative.    Genitourinary: Negative.    Musculoskeletal:  Positive for arthralgias, back pain and myalgias. Negative for gait problem, joint swelling, neck pain and neck stiffness.   Skin: Negative.    Allergic/Immunologic: Negative.    Neurological: Negative.    Psychiatric/Behavioral: Negative.            /84 (BP Location: Left arm, Patient Position: Sitting, BP Cuff Size: Adult)   Pulse 100   Temp 36.7 °C (98 °F) (Temporal)   Resp 16   Wt 99.8 kg (220  lb)   SpO2 100%  Body mass index is 30.68 kg/m².      Objective       Past Medical History  She has a past medical history of Arthritis, Cataract (2018), Depression, Headache, Hyperlipidemia, and Joint pain.    Surgical History  Past Surgical History:   Procedure Laterality Date    BACK SURGERY  05/19/2018    Back Surgery    CERVICAL DISCECTOMY  09/2024    C5-C7, CERVICAL DISC REPLACEMENT    EYE SURGERY  05/19/2018    Eye Surgery    HYSTERECTOMY  1998        Social History  She reports that she has quit smoking. Her smoking use included cigarettes. She has a 22.5 pack-year smoking history. She has never used smokeless tobacco. She reports that she does not drink alcohol and does not use drugs.    Family History  Family History   Problem Relation Name Age of Onset    No Known Problems Mother      Arthritis Father Damaris     Cancer Father Stanchfield         Allergies  Codeine and Sulfamethoxazole    MEDICATIONS:    Current Outpatient Medications:     ascorbic acid (Vitamin C) 500 mg tablet, Take 1 tablet (500 mg) by mouth once daily., Disp: , Rfl:     calcium carbonate (CALCIUM 500 ORAL), Take by mouth., Disp: , Rfl:     cholecalciferol (Vitamin D3) 25 MCG (1000 UT) tablet, Take 1 tablet (25 mcg) by mouth once daily., Disp: , Rfl:     cyanocobalamin (Vitamin B-12) 1,000 mcg tablet, Take 100 mcg by mouth once daily., Disp: , Rfl:     cyclobenzaprine (Flexeril) 10 mg tablet, Take 1 tablet (10 mg) by mouth 3 times a day as needed for muscle spasms., Disp: 60 tablet, Rfl: 2    diazePAM (Valium) 5 mg/mL solution, Take 1 mL (5 mg) by mouth every 8 hours if needed for anxiety., Disp: , Rfl:     gabapentin (Neurontin) 600 mg tablet, Take 2 tablets (1,200 mg) by mouth 3 times a day., Disp: 180 tablet, Rfl: 0    garlic 500 mg capsule, Take by mouth., Disp: , Rfl:     methocarbamol (Robaxin) 750 mg tablet, Take 1 tablet (750 mg) by mouth 3 times a day., Disp: , Rfl:     naloxone (Narcan) 4 mg/0.1 mL nasal spray, Administer 1 spray (4  "mg) into affected nostril(s) if needed for opioid reversal. CALL 911 first. May repeat every 2-3 minutes if needed, alternating nostrils, until medical assistance becomes available., Disp: 2 each, Rfl: 0    oxyCODONE (Oxy-IR) 5 mg immediate release capsule, Take 1 capsule (5 mg) by mouth every 6 hours if needed for severe pain (7 - 10) (every 8 hours)., Disp: , Rfl:     rOPINIRole (Requip) 1 mg tablet, Take 1 tablet (1 mg) by mouth once daily at bedtime., Disp: 90 tablet, Rfl: 1    venlafaxine XR (Effexor-XR) 37.5 mg 24 hr capsule, 1 capsule (37.5 mg)., Disp: , Rfl:     HYDROcodone-acetaminophen (Norco) 5-325 mg tablet, Take 1 tablet by mouth 3 times a day as needed for severe pain (7 - 10)., Disp: 90 tablet, Rfl: 0    Physical Exam  Vitals and nursing note reviewed.   HENT:      Head: Normocephalic.      Nose: Nose normal.   Eyes:      Extraocular Movements: Extraocular movements intact.      Conjunctiva/sclera: Conjunctivae normal.      Pupils: Pupils are equal, round, and reactive to light.   Cardiovascular:      Rate and Rhythm: Normal rate and regular rhythm.   Pulmonary:      Effort: Pulmonary effort is normal.      Breath sounds: Normal breath sounds.   Genitourinary:     Comments: Deferred  Musculoskeletal:         General: Tenderness present. No swelling, deformity or signs of injury.      Cervical back: No rigidity or tenderness.      Right lower leg: No edema.      Left lower leg: No edema.      Comments: \"For full disclosure, patient was asked to pull up their garment to properly visualize the spine. This is done by the patient without me touching their garment.  The spine/joints were examined using gloves.\"    Positive leg raises eliciting back pain.  Left leg raise causes leg pain.   Unable to raise left leg higher than 30 degrees.  Positive for paraspinal tenderness at the lumbar region bilaterally at L4-L5, L5-S1 with slight rotation.  Positive for tactile hyperesthesia.  With nonspecific radicular " symptoms.  Presence of a long scar at the lumbar spine from previous surgery.  BUE 5/5, LLE 5/5, LLE 4/5.    Skin:     General: Skin is warm and dry.   Neurological:      General: No focal deficit present.      Mental Status: She is alert and oriented to person, place, and time.   Psychiatric:         Mood and Affect: Mood normal.         Behavior: Behavior normal.            Pain Management Panel  More data exists         Latest Ref Rng & Units 1/24/2025 8/6/2024   Pain Management Panel   Amphetamine Screen, Urine Presumptive Negative Presumptive Negative  Presumptive Negative    Barbiturate Screen, Urine Presumptive Negative Presumptive Negative  Presumptive Negative    Codeine <50 ng/mL <50  <50    Hydromorphone Urine <25 ng/mL 127  200    Morphine  <50 ng/mL <50  <50           Assessment/Plan   Problem List Items Addressed This Visit           ICD-10-CM    Chronic lumbar radiculopathy M54.16    Relevant Orders    FL pain management    Epidural Steroid Injection    Postlaminectomy syndrome of lumbar region - Primary M96.1    Relevant Orders    FL pain management    Epidural Steroid Injection     Other Visit Diagnoses         Codes      Medication management     Z79.899    Relevant Orders    Opiate/Opioid/Benzo Prescription Compliance          Narrative & Impression   Interpreted By:  Tomy Carlisle,   STUDY:  MR LUMBAR SPINE W AND WO IV CONTRAST;  4/7/2025 5:32 pm      INDICATION:  Signs/Symptoms:concern for caudia equina-recent lumbar laminectomy L1  T12 performed 1 month ago, pelvic numbness and rectal inconteince x  one week.          COMPARISON:  November 28, 2023      ACCESSION NUMBER(S):  [id number]      ORDERING CLINICIAN:  VALENTIN SERVIN      TECHNIQUE:  Multiplanar, multisequence MR imaging of the lumbar spine performed  prior to and following administration of 20 ML Dotarem intravenous  contrast.      FINDINGS:  The L5-S1 level be defined as axial T2 series 8 image 21.      Recent postoperative changes  are present at T12-L1 including a small  fluid collection in the surgical site.      Patient is status post prior posterior fusion of L2 through L5 and  disc spacers a presumed from L3-L4 through L5-S1.      Alignment: There is mild grade 1 retrolisthesis of L1 on L2.      Vertebrae: T1 marrow signal is normal. Vertebral body height is  maintained.      Discs: There varying degrees of loss of disc height and disc  desiccation.      Spinal cord: Signal within the spinal cord is normal. The conus  medullaris terminates at L1.      Spinal canal:  The AP diameter the spinal canal measures 10mm at  LL1-L2 secondary to degenerative change.      L1-L2: There is no significant lateral recess, neural foraminal  narrowing or spinal canal narrowing.      L2-L3: There is moderate left neural foraminal narrowing.      L3-L4: There is moderate left neural foraminal narrowing.      L4-L5: There is no significant lateral recess, neural foraminal  narrowing or spinal canal narrowing.      L5-S1: There is mild to moderate bilateral neural foraminal narrowing.      Soft tissues: The paraspinal soft tissues are normal. A very small  left renal cyst is present.      IMPRESSION:  1. The AP diameter spinal canal is most narrowed at L1-L2 where it  measures 10 mm in AP diameter.  2. A small amount of fluid remains in the operative site posterior  soft tissues. This is to be expected.      MACRO:  None     MR lumbar spine wo IV contrast  Order: [id number]  Impression    IMPRESSION:    There is new degenerative disc disease at L1/L2 with retrolisthesis of L1  on L2 and central disc extrusion with moderate canal stenosis and severe  left and moderate right neural foraminal stenosis.    Unchanged postoperative findings status post lumbar fusion and posterior  decompression from L2 to L5.    Anatomic Lumbar Variant: None.  L4-5 is considered the level of the iliac  crest and assume there are 5 lumbar-type  vertebrae.                      : MALOU    Transcribe Date/Time: Dec 11 2024 11:07A    Dictated by : SAM TROTTER MD    This examination was interpreted and the report reviewed and  electronically signed by:  GUY DIGGS MD on Dec 11 2024  2:09PM  EST  Narrative    * * *Final Report* * *    DATE OF EXAM: Dec 11 2024 10:45AM      [facility]   0303  -  MRI LUMBAR SPINE WO IVCON  / ACCESSION #  [id number]    PROCEDURE REASON: Spinal stenosis of lumbar region without neurogenic  claudication        * * * * Physician Interpretation * * * *     EXAMINATION:  MRI LUMBAR SPINE WO IVCON    CLINICAL HISTORY:  Lumbar radiculopathy.  Prior lumbar.    TECHNIQUE: Routine lumbosacral spine MR protocol without gadolinium.    MQ:  [id number]    COMPARISON: MR lumbar spine 07/22/2022      RESULT:    Counting reference:  Lumbosacral junction.  For the purposes of this  report,  L4-5 is considered the level of the iliac crest and assume there  are 5 lumbar-type vertebrae.  Anatomic variant:  None.    Localizer images:  No additional findings.    Alignment:    There is new degenerative disc disease with moderate  intervertebral disc height loss of L1/L2 with retrolisthesis of L1 on L2.   Patient status post posterior lumbar fusion from L2 to L5 with placement  of interbody cages at L3/L4 and L4/L5.    Bone marrow signal/fracture:  No evidence of pathologic marrow  infiltration.  No evidence of prior fracture.    Conus:  The conus is within normal limits of signal intensity and  morphology.    Paraspinal soft tissues:   Paraspinal soft tissues are within normal  limits.    Lower thoracic spine:  Visualized lower thoracic canal and foramina are  patent.    L1-L2:    There is central disc extrusion slightly eccentric to the left  with resulting moderate spinal canal stenosis.  There is severe left and  moderate right neural foraminal stenosis.    L2-L3:    Decompressed canal.  Foramina are patent.    L3-L4:    Decompressed canal.   Foramina are patent.    L4-L5:    Decompressed canal.  Foramina are patent.    L5-S1:    Decompressed canal.  Mild right and moderate left foraminal  stenosis.    Sacrum and iliac wings:   The visualized sacrum and iliac wings are  within normal limits.  Exam End: 12/11/24 10:45          Media Information          Plan/Follow-up Instructions:      Continue with your prescribed medications.  Do not take sedating medications together.    ---------------    Your next appointment is with Dr. Ching in:    [facility]    For pain block/injection on: 6/3/2025, caudal epidural steroid injection #1    LOCAL    °You will receive a phone call the weekday before your appointment/procedure.   °Please KEEP your scheduled appointments.     °BRING your photo ID, insurance information, and a correct list of your home medications to EVERY visit.    °Please call our office at [phone] if you have any questions.     You will then be seen for a postprocedure follow-up in 4 to 6 weeks in [place].  ---------------          --------------  Disclaimer: This note was created using voice recognition software. It was not corrected for typographical or grammatical errors, inadvertent word insertion, or any unintended errors. Please feel free to contact me for clarification.  --------------      Nilsa Hare, AVANI, APRN, FNP-C   Critical access hospital/[facility]  Office #: [phone]  Fax # [phone]

## 2025-05-21 NOTE — PROGRESS NOTES
Date of the last Controlled Substance Agreement: 1/24/2025       Last urine drug screening date/ordered today: 1/24/2025     Results of last screen: As expected.  Updated today      Last opioid risk screening date/ordered today: 1/24/2025      Pain Scale Screening:   Pain Assessment and Documentation Tool (PADT)   Date of Assessment: 5/21/2025  Analgesia:   Patient reports her pain level on average during the past week is 7on a 0 - 10 scale.   Patient reports that her pain level at its worst during the past week was 9 on a 0 -10 scale.   50% of pain has been relieved during the past week per patient   Patient states that the amount of pain relief she is now obtaining from her current pain reliever(s) is enough to make a real difference in her life.   Query to clinician: Is the patient's pain relief clinically significant? yes  Activities of Daily Living:   Physical functioning: worse  Family relationships: unchanged  Social relationships: unchanged  Mood: worse  Sleep patterns: worse  Overall functioning: worse  Adverse Events: No, Rebecca Saenz is not experiencing side effects from current pain reliever.  Patients overall severity of side effect:none  Specific Analgesic Plan: Continue present regimen.

## 2025-05-21 NOTE — H&P (VIEW-ONLY)
Subjective   Rebecca Saenz is a 54 y.o. female who is here for follow-up visit.  Patient is ambulatory without assistive device.  Gait is steady.  She arrives by herself.    Patient continues to have chronic lower back pain that goes down to her legs with the left worse than the right.  She rates her pain as 7 out of 10.  Left leg pain is constant whereas right leg pain comes and goes.  She describes her pain as sharp, shooting, stabbing pain.  She has pins and needle sensation in her legs.  She reports left leg is something new since after she had surgery.  Before the surgery, it has always been her right leg that was bothering her.  She has hard time sitting down as it aggravates her pain.  She has some leg weakness but denies change in balance or near falling.  She currently denies bowel or bladder incontinence.  She denies recent falls or injuries.    Patient had L1-L2 laminectomy and facetectomies done on 3/11/2025 with Dr. Stephen at [facility].  She had few ED visits after her procedure due to adverse effects.  She had loss control of her bowel.  She had imaging done and they reviewed the results.  Patient reports that her surgeon wants her to follow-up with pain management for an injection.  She also has a referral for spinal stimulator.  Patient has history of L2 to L5 lumbar fusion with interbody cages at L3-L4 and L4-L5.    Patient is taking oxycodone 5 mg IR and Valium as needed prescribed by her surgeon.  She took oxycodone this morning.  Patient OARRS report, she had 21 tabs of oxycodone 5 mg on 5/9/2025 from her surgeon.  [facility]  She is not taking the muscle relaxant.  She reports it was not working.  She continues to take Effexor for anxiety and depression.  She was doing physical therapy and continues to do her stretching exercises.    I reviewed previous notes, labs and imaging.  She had MRI of her lumbar spine that was on 4/7/2025 and I reviewed the  results.  I discussed plan of care including pharmacologic and joint intervention procedure.  I discussed caudal epidural block and she is in agreement to proceed to this procedure.  This is done under fluoroscopy.  [facility]. Questions were answered during this encounter.    I have reviewed the OARRS report.  Last fill date of Norco was on 2/22/2025 where she received 90 tabs.  No suspicious activities.  Toxicology reports based on previous drug screens were consistent with no compliance issues.    The patient was counseled regarding diagnostic results, instructions for management, risk factor reductions, risks and benefits of treatment options and importance of compliance with treatment.      9/17/2024: C5-C6, C6-C7 anterior cervical discectomy and disc replacement with Dr. Stephen.  ----------------  PROCEDURES:    6/25/2024: Cervical BERENICE #1  4/30/2024: Left L1-L2 and L2-L3 TFESI  2/20/2024: Caudal BERENICE  1/2/2024: Left L1-L2 and L2-L3 TFESI  --------    OARRS:  Nilsa Hare, APRN-CNP, DNP on 5/21/2025  8:26 AM  I have personally reviewed the OARRS report for Rebecca Saenz. I have considered the risks of abuse, dependence, addiction and diversion and I believe that it is clinically appropriate for Rebecca Saenz to be prescribed this medication    Review of Systems   Constitutional: Negative.    HENT: Negative.     Eyes: Negative.    Respiratory: Negative.     Cardiovascular: Negative.    Gastrointestinal: Negative.    Endocrine: Negative.    Genitourinary: Negative.    Musculoskeletal:  Positive for arthralgias, back pain and myalgias. Negative for gait problem, joint swelling, neck pain and neck stiffness.   Skin: Negative.    Allergic/Immunologic: Negative.    Neurological: Negative.    Psychiatric/Behavioral: Negative.            /84 (BP Location: Left arm, Patient Position: Sitting, BP Cuff Size: Adult)   Pulse 100   Temp 36.7 °C (98 °F) (Temporal)   Resp 16   Wt 99.8 kg (220  lb)   SpO2 100%  Body mass index is 30.68 kg/m².      Objective       Past Medical History  She has a past medical history of Arthritis, Cataract (2018), Depression, Headache, Hyperlipidemia, and Joint pain.    Surgical History  Past Surgical History:   Procedure Laterality Date    BACK SURGERY  05/19/2018    Back Surgery    CERVICAL DISCECTOMY  09/2024    C5-C7, CERVICAL DISC REPLACEMENT    EYE SURGERY  05/19/2018    Eye Surgery    HYSTERECTOMY  1998        Social History  She reports that she has quit smoking. Her smoking use included cigarettes. She has a 22.5 pack-year smoking history. She has never used smokeless tobacco. She reports that she does not drink alcohol and does not use drugs.    Family History  Family History   Problem Relation Name Age of Onset    No Known Problems Mother      Arthritis Father Damaris     Cancer Father Rewey         Allergies  Codeine and Sulfamethoxazole    MEDICATIONS:    Current Outpatient Medications:     ascorbic acid (Vitamin C) 500 mg tablet, Take 1 tablet (500 mg) by mouth once daily., Disp: , Rfl:     calcium carbonate (CALCIUM 500 ORAL), Take by mouth., Disp: , Rfl:     cholecalciferol (Vitamin D3) 25 MCG (1000 UT) tablet, Take 1 tablet (25 mcg) by mouth once daily., Disp: , Rfl:     cyanocobalamin (Vitamin B-12) 1,000 mcg tablet, Take 100 mcg by mouth once daily., Disp: , Rfl:     cyclobenzaprine (Flexeril) 10 mg tablet, Take 1 tablet (10 mg) by mouth 3 times a day as needed for muscle spasms., Disp: 60 tablet, Rfl: 2    diazePAM (Valium) 5 mg/mL solution, Take 1 mL (5 mg) by mouth every 8 hours if needed for anxiety., Disp: , Rfl:     gabapentin (Neurontin) 600 mg tablet, Take 2 tablets (1,200 mg) by mouth 3 times a day., Disp: 180 tablet, Rfl: 0    garlic 500 mg capsule, Take by mouth., Disp: , Rfl:     methocarbamol (Robaxin) 750 mg tablet, Take 1 tablet (750 mg) by mouth 3 times a day., Disp: , Rfl:     naloxone (Narcan) 4 mg/0.1 mL nasal spray, Administer 1 spray (4  "mg) into affected nostril(s) if needed for opioid reversal. CALL 911 first. May repeat every 2-3 minutes if needed, alternating nostrils, until medical assistance becomes available., Disp: 2 each, Rfl: 0    oxyCODONE (Oxy-IR) 5 mg immediate release capsule, Take 1 capsule (5 mg) by mouth every 6 hours if needed for severe pain (7 - 10) (every 8 hours)., Disp: , Rfl:     rOPINIRole (Requip) 1 mg tablet, Take 1 tablet (1 mg) by mouth once daily at bedtime., Disp: 90 tablet, Rfl: 1    venlafaxine XR (Effexor-XR) 37.5 mg 24 hr capsule, 1 capsule (37.5 mg)., Disp: , Rfl:     HYDROcodone-acetaminophen (Norco) 5-325 mg tablet, Take 1 tablet by mouth 3 times a day as needed for severe pain (7 - 10)., Disp: 90 tablet, Rfl: 0    Physical Exam  Vitals and nursing note reviewed.   HENT:      Head: Normocephalic.      Nose: Nose normal.   Eyes:      Extraocular Movements: Extraocular movements intact.      Conjunctiva/sclera: Conjunctivae normal.      Pupils: Pupils are equal, round, and reactive to light.   Cardiovascular:      Rate and Rhythm: Normal rate and regular rhythm.   Pulmonary:      Effort: Pulmonary effort is normal.      Breath sounds: Normal breath sounds.   Genitourinary:     Comments: Deferred  Musculoskeletal:         General: Tenderness present. No swelling, deformity or signs of injury.      Cervical back: No rigidity or tenderness.      Right lower leg: No edema.      Left lower leg: No edema.      Comments: \"For full disclosure, patient was asked to pull up their garment to properly visualize the spine. This is done by the patient without me touching their garment.  The spine/joints were examined using gloves.\"    Positive leg raises eliciting back pain.  Left leg raise causes leg pain.   Unable to raise left leg higher than 30 degrees.  Positive for paraspinal tenderness at the lumbar region bilaterally at L4-L5, L5-S1 with slight rotation.  Positive for tactile hyperesthesia.  With nonspecific radicular " symptoms.  Presence of a long scar at the lumbar spine from previous surgery.  BUE 5/5, LLE 5/5, LLE 4/5.    Skin:     General: Skin is warm and dry.   Neurological:      General: No focal deficit present.      Mental Status: She is alert and oriented to person, place, and time.   Psychiatric:         Mood and Affect: Mood normal.         Behavior: Behavior normal.            Pain Management Panel  More data exists         Latest Ref Rng & Units 1/24/2025 8/6/2024   Pain Management Panel   Amphetamine Screen, Urine Presumptive Negative Presumptive Negative  Presumptive Negative    Barbiturate Screen, Urine Presumptive Negative Presumptive Negative  Presumptive Negative    Codeine <50 ng/mL <50  <50    Hydromorphone Urine <25 ng/mL 127  200    Morphine  <50 ng/mL <50  <50           Assessment/Plan   Problem List Items Addressed This Visit           ICD-10-CM    Chronic lumbar radiculopathy M54.16    Relevant Orders    FL pain management    Epidural Steroid Injection    Postlaminectomy syndrome of lumbar region - Primary M96.1    Relevant Orders    FL pain management    Epidural Steroid Injection     Other Visit Diagnoses         Codes      Medication management     Z79.899    Relevant Orders    Opiate/Opioid/Benzo Prescription Compliance          Narrative & Impression   Interpreted By:  Tomy Carlisle,   STUDY:  MR LUMBAR SPINE W AND WO IV CONTRAST;  4/7/2025 5:32 pm      INDICATION:  Signs/Symptoms:concern for caudia equina-recent lumbar laminectomy L1  T12 performed 1 month ago, pelvic numbness and rectal inconteince x  one week.          COMPARISON:  November 28, 2023      ACCESSION NUMBER(S):  [id number]      ORDERING CLINICIAN:  VALENTIN SERVIN      TECHNIQUE:  Multiplanar, multisequence MR imaging of the lumbar spine performed  prior to and following administration of 20 ML Dotarem intravenous  contrast.      FINDINGS:  The L5-S1 level be defined as axial T2 series 8 image 21.      Recent postoperative changes  are present at T12-L1 including a small  fluid collection in the surgical site.      Patient is status post prior posterior fusion of L2 through L5 and  disc spacers a presumed from L3-L4 through L5-S1.      Alignment: There is mild grade 1 retrolisthesis of L1 on L2.      Vertebrae: T1 marrow signal is normal. Vertebral body height is  maintained.      Discs: There varying degrees of loss of disc height and disc  desiccation.      Spinal cord: Signal within the spinal cord is normal. The conus  medullaris terminates at L1.      Spinal canal:  The AP diameter the spinal canal measures 10mm at  LL1-L2 secondary to degenerative change.      L1-L2: There is no significant lateral recess, neural foraminal  narrowing or spinal canal narrowing.      L2-L3: There is moderate left neural foraminal narrowing.      L3-L4: There is moderate left neural foraminal narrowing.      L4-L5: There is no significant lateral recess, neural foraminal  narrowing or spinal canal narrowing.      L5-S1: There is mild to moderate bilateral neural foraminal narrowing.      Soft tissues: The paraspinal soft tissues are normal. A very small  left renal cyst is present.      IMPRESSION:  1. The AP diameter spinal canal is most narrowed at L1-L2 where it  measures 10 mm in AP diameter.  2. A small amount of fluid remains in the operative site posterior  soft tissues. This is to be expected.      MACRO:  None     MR lumbar spine wo IV contrast  Order: [id number]  Impression    IMPRESSION:    There is new degenerative disc disease at L1/L2 with retrolisthesis of L1  on L2 and central disc extrusion with moderate canal stenosis and severe  left and moderate right neural foraminal stenosis.    Unchanged postoperative findings status post lumbar fusion and posterior  decompression from L2 to L5.    Anatomic Lumbar Variant: None.  L4-5 is considered the level of the iliac  crest and assume there are 5 lumbar-type  vertebrae.                      : MALOU    Transcribe Date/Time: Dec 11 2024 11:07A    Dictated by : SAM TROTTER MD    This examination was interpreted and the report reviewed and  electronically signed by:  GUY DIGGS MD on Dec 11 2024  2:09PM  EST  Narrative    * * *Final Report* * *    DATE OF EXAM: Dec 11 2024 10:45AM      [facility]   0303  -  MRI LUMBAR SPINE WO IVCON  / ACCESSION #  [id number]    PROCEDURE REASON: Spinal stenosis of lumbar region without neurogenic  claudication        * * * * Physician Interpretation * * * *     EXAMINATION:  MRI LUMBAR SPINE WO IVCON    CLINICAL HISTORY:  Lumbar radiculopathy.  Prior lumbar.    TECHNIQUE: Routine lumbosacral spine MR protocol without gadolinium.    MQ:  [id number]    COMPARISON: MR lumbar spine 07/22/2022      RESULT:    Counting reference:  Lumbosacral junction.  For the purposes of this  report,  L4-5 is considered the level of the iliac crest and assume there  are 5 lumbar-type vertebrae.  Anatomic variant:  None.    Localizer images:  No additional findings.    Alignment:    There is new degenerative disc disease with moderate  intervertebral disc height loss of L1/L2 with retrolisthesis of L1 on L2.   Patient status post posterior lumbar fusion from L2 to L5 with placement  of interbody cages at L3/L4 and L4/L5.    Bone marrow signal/fracture:  No evidence of pathologic marrow  infiltration.  No evidence of prior fracture.    Conus:  The conus is within normal limits of signal intensity and  morphology.    Paraspinal soft tissues:   Paraspinal soft tissues are within normal  limits.    Lower thoracic spine:  Visualized lower thoracic canal and foramina are  patent.    L1-L2:    There is central disc extrusion slightly eccentric to the left  with resulting moderate spinal canal stenosis.  There is severe left and  moderate right neural foraminal stenosis.    L2-L3:    Decompressed canal.  Foramina are patent.    L3-L4:    Decompressed canal.   Foramina are patent.    L4-L5:    Decompressed canal.  Foramina are patent.    L5-S1:    Decompressed canal.  Mild right and moderate left foraminal  stenosis.    Sacrum and iliac wings:   The visualized sacrum and iliac wings are  within normal limits.  Exam End: 12/11/24 10:45          Media Information          Plan/Follow-up Instructions:      Continue with your prescribed medications.  Do not take sedating medications together.    ---------------    Your next appointment is with Dr. Ching in:    [facility]    For pain block/injection on: 6/3/2025, caudal epidural steroid injection #1    LOCAL    °You will receive a phone call the weekday before your appointment/procedure.   °Please KEEP your scheduled appointments.     °BRING your photo ID, insurance information, and a correct list of your home medications to EVERY visit.    °Please call our office at [phone] if you have any questions.     You will then be seen for a postprocedure follow-up in 4 to 6 weeks in [place].  ---------------          --------------  Disclaimer: This note was created using voice recognition software. It was not corrected for typographical or grammatical errors, inadvertent word insertion, or any unintended errors. Please feel free to contact me for clarification.  --------------      Nilsa Hare, AVANI, APRN, FNP-C   [facility]  Office #: [phone]  Fax # [phone]

## 2025-05-24 LAB
1OH-MIDAZOLAM UR-MCNC: NEGATIVE NG/ML
7AMINOCLONAZEPAM UR-MCNC: NEGATIVE NG/ML
A-OH ALPRAZ UR-MCNC: NEGATIVE NG/ML
A-OH-TRIAZOLAM UR-MCNC: NEGATIVE NG/ML
AMPHETAMINES UR QL: NEGATIVE NG/ML
BARBITURATES UR QL: NEGATIVE NG/ML
BZE UR QL: NEGATIVE NG/ML
CODEINE UR-MCNC: NEGATIVE NG/ML
CREAT UR-MCNC: 213.5 MG/DL
DRUG SCREEN COMMENT UR-IMP: ABNORMAL
EDDP UR-MCNC: NEGATIVE NG/ML
FENTANYL UR-MCNC: NEGATIVE NG/ML
HYDROCODONE UR-MCNC: NEGATIVE NG/ML
HYDROMORPHONE UR-MCNC: NEGATIVE NG/ML
LORAZEPAM UR-MCNC: NEGATIVE NG/ML
METHADONE UR-MCNC: NEGATIVE NG/ML
MORPHINE UR-MCNC: NEGATIVE NG/ML
NORDIAZEPAM UR-MCNC: 583 NG/ML
NORFENTANYL UR-MCNC: NEGATIVE NG/ML
NORHYDROCODONE UR CFM-MCNC: NEGATIVE NG/ML
NOROXYCODONE UR CFM-MCNC: 5130 NG/ML
NORTRAMADOL UR-MCNC: NEGATIVE NG/ML
OH-ETHYLFLURAZ UR-MCNC: NEGATIVE NG/ML
OXAZEPAM UR-MCNC: >2000 NG/ML
OXIDANTS UR QL: NEGATIVE MCG/ML
OXYCODONE UR CFM-MCNC: 660 NG/ML
OXYMORPHONE UR CFM-MCNC: 1180 NG/ML
PCP UR QL: NEGATIVE NG/ML
PH UR: 5 [PH] (ref 4.5–9)
QUEST 6 ACETYLMORPHINE: NEGATIVE NG/ML
QUEST NOTES AND COMMENTS: ABNORMAL
QUEST ZOLPIDEM: NEGATIVE NG/ML
TEMAZEPAM UR-MCNC: 1490 NG/ML
THC UR QL: NEGATIVE NG/ML
TRAMADOL UR-MCNC: NEGATIVE NG/ML
ZOLPIDEM PHENYL-4-CARB UR CFM-MCNC: NEGATIVE NG/ML

## 2025-06-02 ENCOUNTER — TELEPHONE (OUTPATIENT)
Dept: PAIN MEDICINE | Facility: HOSPITAL | Age: 55
End: 2025-06-02
Payer: OTHER GOVERNMENT

## 2025-06-02 DIAGNOSIS — Z79.891 ENCOUNTER FOR LONG-TERM USE OF OPIATE ANALGESIC: ICD-10-CM

## 2025-06-02 DIAGNOSIS — M48.062 LUMBAR STENOSIS WITH NEUROGENIC CLAUDICATION: ICD-10-CM

## 2025-06-02 DIAGNOSIS — M54.16 CHRONIC LUMBAR RADICULOPATHY: ICD-10-CM

## 2025-06-02 DIAGNOSIS — M96.1 POSTLAMINECTOMY SYNDROME OF LUMBAR REGION: ICD-10-CM

## 2025-06-02 RX ORDER — HYDROCODONE BITARTRATE AND ACETAMINOPHEN 5; 325 MG/1; MG/1
1 TABLET ORAL 3 TIMES DAILY PRN
Qty: 90 TABLET | Refills: 0 | Status: SHIPPED | OUTPATIENT
Start: 2025-06-02

## 2025-06-03 ENCOUNTER — HOSPITAL ENCOUNTER (OUTPATIENT)
Dept: PAIN MEDICINE | Facility: HOSPITAL | Age: 55
Discharge: HOME | End: 2025-06-03
Payer: OTHER GOVERNMENT

## 2025-06-03 ENCOUNTER — APPOINTMENT (OUTPATIENT)
Dept: PAIN MEDICINE | Facility: HOSPITAL | Age: 55
End: 2025-06-03
Payer: OTHER GOVERNMENT

## 2025-06-03 VITALS
BODY MASS INDEX: 31.5 KG/M2 | WEIGHT: 225 LBS | RESPIRATION RATE: 17 BRPM | OXYGEN SATURATION: 100 % | DIASTOLIC BLOOD PRESSURE: 96 MMHG | SYSTOLIC BLOOD PRESSURE: 127 MMHG | HEART RATE: 96 BPM | HEIGHT: 71 IN | TEMPERATURE: 97.9 F

## 2025-06-03 DIAGNOSIS — M54.16 CHRONIC LUMBAR RADICULOPATHY: ICD-10-CM

## 2025-06-03 DIAGNOSIS — M96.1 POSTLAMINECTOMY SYNDROME OF LUMBAR REGION: ICD-10-CM

## 2025-06-03 PROCEDURE — 2500000004 HC RX 250 GENERAL PHARMACY W/ HCPCS (ALT 636 FOR OP/ED): Mod: JZ | Performed by: ANESTHESIOLOGY

## 2025-06-03 PROCEDURE — 2550000001 HC RX 255 CONTRASTS: Mod: JW | Performed by: ANESTHESIOLOGY

## 2025-06-03 PROCEDURE — 62323 NJX INTERLAMINAR LMBR/SAC: CPT | Performed by: ANESTHESIOLOGY

## 2025-06-03 RX ORDER — LIDOCAINE HYDROCHLORIDE 10 MG/ML
INJECTION, SOLUTION EPIDURAL; INFILTRATION; INTRACAUDAL; PERINEURAL AS NEEDED
Status: COMPLETED | OUTPATIENT
Start: 2025-06-03 | End: 2025-06-03

## 2025-06-03 RX ORDER — LIDOCAINE HYDROCHLORIDE 5 MG/ML
INJECTION, SOLUTION INFILTRATION; INTRAVENOUS AS NEEDED
Status: COMPLETED | OUTPATIENT
Start: 2025-06-03 | End: 2025-06-03

## 2025-06-03 RX ORDER — METHYLPREDNISOLONE ACETATE 40 MG/ML
INJECTION, SUSPENSION INTRA-ARTICULAR; INTRALESIONAL; INTRAMUSCULAR; SOFT TISSUE AS NEEDED
Status: COMPLETED | OUTPATIENT
Start: 2025-06-03 | End: 2025-06-03

## 2025-06-03 RX ADMIN — LIDOCAINE HYDROCHLORIDE 10 ML: 10 INJECTION, SOLUTION EPIDURAL; INFILTRATION; INTRACAUDAL; PERINEURAL at 11:30

## 2025-06-03 RX ADMIN — LIDOCAINE HYDROCHLORIDE 5 ML: 5 INJECTION, SOLUTION INFILTRATION at 11:30

## 2025-06-03 RX ADMIN — IOHEXOL 3 ML: 240 INJECTION, SOLUTION INTRATHECAL; INTRAVASCULAR; INTRAVENOUS; ORAL at 11:30

## 2025-06-03 RX ADMIN — METHYLPREDNISOLONE ACETATE 80 MG: 40 INJECTION, SUSPENSION INTRA-ARTICULAR; INTRALESIONAL; INTRAMUSCULAR; SOFT TISSUE at 11:29

## 2025-06-03 ASSESSMENT — PAIN SCALES - GENERAL
PAINLEVEL_OUTOF10: 6
PAINLEVEL_OUTOF10: 0 - NO PAIN

## 2025-06-03 ASSESSMENT — COLUMBIA-SUICIDE SEVERITY RATING SCALE - C-SSRS
1. IN THE PAST MONTH, HAVE YOU WISHED YOU WERE DEAD OR WISHED YOU COULD GO TO SLEEP AND NOT WAKE UP?: NO
2. HAVE YOU ACTUALLY HAD ANY THOUGHTS OF KILLING YOURSELF?: NO
1. IN THE PAST MONTH, HAVE YOU WISHED YOU WERE DEAD OR WISHED YOU COULD GO TO SLEEP AND NOT WAKE UP?: NO
6. HAVE YOU EVER DONE ANYTHING, STARTED TO DO ANYTHING, OR PREPARED TO DO ANYTHING TO END YOUR LIFE?: NO
6. HAVE YOU EVER DONE ANYTHING, STARTED TO DO ANYTHING, OR PREPARED TO DO ANYTHING TO END YOUR LIFE?: NO
2. HAVE YOU ACTUALLY HAD ANY THOUGHTS OF KILLING YOURSELF?: NO

## 2025-06-03 ASSESSMENT — PAIN - FUNCTIONAL ASSESSMENT
PAIN_FUNCTIONAL_ASSESSMENT: 0-10
PAIN_FUNCTIONAL_ASSESSMENT: 0-10

## 2025-06-03 ASSESSMENT — PATIENT HEALTH QUESTIONNAIRE - PHQ9
1. LITTLE INTEREST OR PLEASURE IN DOING THINGS: NOT AT ALL
2. FEELING DOWN, DEPRESSED OR HOPELESS: NOT AT ALL
SUM OF ALL RESPONSES TO PHQ9 QUESTIONS 1 AND 2: 0

## 2025-06-03 ASSESSMENT — PAIN DESCRIPTION - DESCRIPTORS: DESCRIPTORS: DISCOMFORT

## 2025-06-03 NOTE — DISCHARGE INSTRUCTIONS
No heavy lifting or strenuous activity today.    Keep bandage on for 24 hours.  Keep injection site clean and dry, it may be sore for up to 48 hours.  For relief of pain and swelling, you may apply ice to the injection site area.  If pain persist you may apply moist heat.  Common side effects of steroids include insomnia, facial flushing, increased appetite, headaches, sweating, fluid retention. If you have diabetes your blood sugar may increase. Please monitor your diet and your blood sugar should return to normal in a few days. If your sugar becomes dangerously high, please contact your physician that manages your diabetes for further guidance.  Rare side effects include infection, bleeding, nerve damage. If you have fever, redness or swelling near site, severe pain, please go to the nearest emergency room. For other questions please call office at [phone]. Local anesthetics wear off in several hours and duration of relief vary from person to person.

## 2025-06-03 NOTE — INTERVAL H&P NOTE
H&P reviewed. The patient was examined and there are no changes to the H&P.  No significant findings; reviewed medications and allergies; patient seen and discussed with attending physician responsible for performing the procedure.

## 2025-06-16 ENCOUNTER — TELEPHONE (OUTPATIENT)
Dept: PAIN MEDICINE | Facility: HOSPITAL | Age: 55
End: 2025-06-16
Payer: OTHER GOVERNMENT

## 2025-06-23 ENCOUNTER — OFFICE VISIT (OUTPATIENT)
Dept: PAIN MEDICINE | Facility: HOSPITAL | Age: 55
End: 2025-06-23
Payer: OTHER GOVERNMENT

## 2025-06-23 VITALS
WEIGHT: 225 LBS | HEART RATE: 103 BPM | TEMPERATURE: 97.6 F | OXYGEN SATURATION: 100 % | SYSTOLIC BLOOD PRESSURE: 158 MMHG | BODY MASS INDEX: 31.5 KG/M2 | DIASTOLIC BLOOD PRESSURE: 87 MMHG | HEIGHT: 71 IN | RESPIRATION RATE: 16 BRPM

## 2025-06-23 DIAGNOSIS — M96.1 POSTLAMINECTOMY SYNDROME OF LUMBAR REGION: ICD-10-CM

## 2025-06-23 DIAGNOSIS — M48.062 LUMBAR STENOSIS WITH NEUROGENIC CLAUDICATION: ICD-10-CM

## 2025-06-23 DIAGNOSIS — M54.16 CHRONIC LUMBAR RADICULOPATHY: Primary | ICD-10-CM

## 2025-06-23 DIAGNOSIS — Z79.891 ENCOUNTER FOR LONG-TERM USE OF OPIATE ANALGESIC: ICD-10-CM

## 2025-06-23 PROCEDURE — 99214 OFFICE O/P EST MOD 30 MIN: CPT | Performed by: NURSE PRACTITIONER

## 2025-06-23 PROCEDURE — 1036F TOBACCO NON-USER: CPT | Performed by: NURSE PRACTITIONER

## 2025-06-23 PROCEDURE — 3008F BODY MASS INDEX DOCD: CPT | Performed by: NURSE PRACTITIONER

## 2025-06-23 RX ORDER — HYDROCODONE BITARTRATE AND ACETAMINOPHEN 5; 325 MG/1; MG/1
1 TABLET ORAL 3 TIMES DAILY PRN
Qty: 90 TABLET | Refills: 0 | Status: SHIPPED | OUTPATIENT
Start: 2025-07-01

## 2025-06-23 ASSESSMENT — ENCOUNTER SYMPTOMS
JOINT SWELLING: 0
ALLERGIC/IMMUNOLOGIC NEGATIVE: 1
NEUROLOGICAL NEGATIVE: 1
EYES NEGATIVE: 1
BACK PAIN: 1
MYALGIAS: 1
ENDOCRINE NEGATIVE: 1
CONSTITUTIONAL NEGATIVE: 1
NECK PAIN: 0
ARTHRALGIAS: 1
NECK STIFFNESS: 0
RESPIRATORY NEGATIVE: 1
PSYCHIATRIC NEGATIVE: 1
CARDIOVASCULAR NEGATIVE: 1
GASTROINTESTINAL NEGATIVE: 1

## 2025-06-23 ASSESSMENT — PAIN SCALES - GENERAL: PAINLEVEL_OUTOF10: 6

## 2025-06-23 NOTE — PATIENT INSTRUCTIONS
Continue with your prescribed medications.    I sent 90 tabs of Boaz to your pharmacy with a pickup date of 7/1/2025.  Continue taking 1 pill 3 times a day for pain relief.  Do not take sedating medications together.    ---Risks and side effects of chronic opioid therapy, including but not limited to tolerance, dependence, constipation, hyperalgesia, cognitive side effects, addiction, and possible death due to overuse and or misuse, were discussed.   Medications, when co-administered with other sedative agents, including but not limited to alcohol, benzodiazepines, sedatives or hypnotics, and illegal drugs, could pose life-threatening consequences, including death.   Such medication could impact if you have obstructive sleep apnea. I recommend that you continue using apnea devices if ordered by other physicians.   Being compliant with the treatment plan and the terms of the opioid agreement to effectively and safely treat the pain is important.  Being responsible with the medications and taking these only as prescribed, never in excess, and never for reasons other than pain reduction.   Keep the medications safe and locked away from children and other adults, as well as disposal methods and options. The patient understood the risks and instructions. ---    ---------------    Your next appointment is with Dr. Ching in:    [facility]    For pain block/injection on: 7/1/2025, left L4-L5 and L5-S1 transforaminal epidural steroid injection #1    LOCAL     °If you are on Pain Medication, please bring it with you with every procedures or visits for a pill count.     °You will receive a phone call the weekday before your appointment/procedure.   °Please KEEP your scheduled appointments.     °BRING your photo ID, insurance information, and a correct list of your home medications to EVERY visit.    °Please call our office at [phone] if you have any questions.     You will then be seen for a  postprocedure follow-up in 4 to 6 weeks in [place]  ---------------

## 2025-06-23 NOTE — H&P (VIEW-ONLY)
Subjective   Rebecca Saenz is a 54 y.o. female who is here for follow-up visit.  Patient is ambulatory with the use of cane. Gait is steady.  She arrives by herself.    Patient continues to have left hip pain that goes down to her leg.  She rates her pain as 6 out of 10.  It is constant.  She describes it as aching, cramping, spastic, stabbing and tender kind of pain.  She continues to have numbness, pins-and-needles sensation that goes down to her left leg.  Patient had caudal BERENICE on 6/3/2025.  She reports she obtained more than 50% relief.  Before the injection she had  pain to both legs now the left leg is persistent that she has to use a cane to ambulate.  She denies increasing leg weakness or change in balance.  She denies bowel or bladder incontinence.  She denies recent falls or injuries.  There has been no changes since she was last seen.    History: Patient had L1-L2 laminectomy and facetectomies done on 3/11/2025 with Dr. Stephen at [facility].  She had few ED visits after her procedure due to adverse effects.  She had loss control of her bowel.  She had imaging done and they reviewed the results.  Patient reports that her surgeon wants her to follow-up with pain management for an injection.  She also has a referral for spinal stimulator.  Patient has history of L2 to L5 lumbar fusion with interbody cages at L3-L4 and L4-L5.    [facility] to [facility].  She is on gabapentin and muscle relaxant.  She takes Norco prescribed by this office. She takes it 3 times a day and last dose was this morning.  She has 24 pills left her actual count. She continues to take Effexor for anxiety and depression.  She denies side effects to her medications.  She was doing physical therapy and continues to do her stretching exercises.    I reviewed previous notes, labs and imaging.  I discussed plan of care including pharmacologic and joint intervention procedure.  I discussed transforaminal  BERENICE.  She had left TFESI on 4/30/2024 at L1-L2 and L2-L3.  We will address L4-L5 and L5-S1.  She is in agreement to proceed to this procedure.  This is done under fluoroscopy.  [facility] will [facility]. Questions were answered during this encounter.    I have reviewed the OARRS report.  Last fill date of Norco was on 6/2/2025 where she received 90 tabs.  No suspicious activities.  Toxicology reports based on previous drug screens were consistent with no compliance issues.    The patient was counseled regarding diagnostic results, instructions for management, risk factor reductions, risks and benefits of treatment options and importance of compliance with treatment.      9/17/2024: C5-C6, C6-C7 anterior cervical discectomy and disc replacement with Dr. Stephen.  ----------------  PROCEDURES:  6/3/2025: Caudal BERENICE #1  6/25/2024: Cervical BERENICE #1  4/30/2024: Left L1-L2 and L2-L3 TFESI  2/20/2024: Caudal BERENICE  1/2/2024: Left L1-L2 and L2-L3 TFESI  --------    OARRS:  Nilsa Hare, APRN-CNP, DNP on 6/23/2025 11:46 AM  I have personally reviewed the OARRS report for Rebecca Saenz. I have considered the risks of abuse, dependence, addiction and diversion and I believe that it is clinically appropriate for Rebecca Saenz to be prescribed this medication    Review of Systems   Constitutional: Negative.    HENT: Negative.     Eyes: Negative.    Respiratory: Negative.     Cardiovascular: Negative.    Gastrointestinal: Negative.    Endocrine: Negative.    Genitourinary: Negative.    Musculoskeletal:  Positive for arthralgias, back pain and myalgias. Negative for gait problem, joint swelling, neck pain and neck stiffness.   Skin: Negative.    Allergic/Immunologic: Negative.    Neurological: Negative.    Psychiatric/Behavioral: Negative.            /87 (BP Location: Left arm, Patient Position: Sitting, BP Cuff Size: Adult)   Pulse 103   Temp 36.4 °C (97.6 °F) (Temporal)   Resp 16   Wt 102 kg (225 lb)   SpO2 100%   Body mass index is 31.38 kg/m².      Objective       Past Medical History  She has a past medical history of Arthritis, Cataract (2018), Depression, Headache, Hyperlipidemia, and Joint pain.    Surgical History  Past Surgical History:   Procedure Laterality Date    BACK SURGERY  05/19/2018    Back Surgery    CERVICAL DISCECTOMY  09/2024    C5-C7, CERVICAL DISC REPLACEMENT    EYE SURGERY  05/19/2018    Eye Surgery    HYSTERECTOMY  1998        Social History  She reports that she has quit smoking. Her smoking use included cigarettes. She has a 22.5 pack-year smoking history. She has never used smokeless tobacco. She reports current alcohol use. She reports that she does not use drugs.    Family History  Family History   Problem Relation Name Age of Onset    No Known Problems Mother      Arthritis Father Herrings     Cancer Father Herrings         Allergies  Codeine and Sulfamethoxazole    MEDICATIONS:    Current Outpatient Medications:     ascorbic acid (Vitamin C) 500 mg tablet, Take 1 tablet (500 mg) by mouth once daily., Disp: , Rfl:     calcium carbonate (CALCIUM 500 ORAL), Take by mouth., Disp: , Rfl:     cholecalciferol (Vitamin D3) 25 MCG (1000 UT) tablet, Take 1 tablet (25 mcg) by mouth once daily., Disp: , Rfl:     cyanocobalamin (Vitamin B-12) 1,000 mcg tablet, Take 100 mcg by mouth once daily., Disp: , Rfl:     cyclobenzaprine (Flexeril) 10 mg tablet, Take 1 tablet (10 mg) by mouth 3 times a day as needed for muscle spasms., Disp: 60 tablet, Rfl: 2    gabapentin (Neurontin) 600 mg tablet, Take 2 tablets (1,200 mg) by mouth 3 times a day., Disp: 180 tablet, Rfl: 0    garlic 500 mg capsule, Take by mouth., Disp: , Rfl:     methocarbamol (Robaxin) 750 mg tablet, Take 1 tablet (750 mg) by mouth 3 times a day., Disp: , Rfl:     naloxone (Narcan) 4 mg/0.1 mL nasal spray, Administer 1 spray (4 mg) into affected nostril(s) if needed for opioid reversal. CALL 911 first. May repeat every 2-3 minutes if needed, alternating  "nostrils, until medical assistance becomes available., Disp: 2 each, Rfl: 0    rOPINIRole (Requip) 1 mg tablet, Take 1 tablet (1 mg) by mouth once daily at bedtime., Disp: 90 tablet, Rfl: 1    venlafaxine XR (Effexor-XR) 37.5 mg 24 hr capsule, 1 capsule (37.5 mg)., Disp: , Rfl:     [START ON 7/1/2025] HYDROcodone-acetaminophen (Norco) 5-325 mg tablet, Take 1 tablet by mouth 3 times a day as needed for severe pain (7 - 10). Do not fill before July 1, 2025., Disp: 90 tablet, Rfl: 0    Physical Exam  Vitals and nursing note reviewed.   HENT:      Head: Normocephalic.      Nose: Nose normal.   Eyes:      Extraocular Movements: Extraocular movements intact.      Conjunctiva/sclera: Conjunctivae normal.      Pupils: Pupils are equal, round, and reactive to light.   Cardiovascular:      Rate and Rhythm: Normal rate and regular rhythm.   Pulmonary:      Effort: Pulmonary effort is normal.      Breath sounds: Normal breath sounds.   Genitourinary:     Comments: Deferred  Musculoskeletal:         General: Tenderness present. No swelling, deformity or signs of injury.      Cervical back: No rigidity or tenderness.      Right lower leg: No edema.      Left lower leg: No edema.      Comments: \"For full disclosure, patient was asked to pull up their garment to properly visualize the spine. This is done by the patient without me touching their garment.  The spine/joints were examined using gloves.\"    Positive left leg raise causes leg pain.   Positive for paraspinal tenderness at the lumbar region bilaterally at L4-L5, L5-S1 with slight rotation.  Positive for tactile hyperesthesia.  With left-sided radicular symptoms that follows this pathway.  Presence of a long scar at the lumbar spine from previous surgery.  Positive for tenderness on palpation at the left femoral hip joint.  BUE 5/5, BLE 4/5.    Skin:     General: Skin is warm and dry.   Neurological:      General: No focal deficit present.      Mental Status: She is alert " and oriented to person, place, and time.   Psychiatric:         Mood and Affect: Mood normal.         Behavior: Behavior normal.            Pain Management Panel  More data exists         Latest Ref Rng & Units 1/24/2025 8/6/2024   Pain Management Panel   Amphetamine Screen, Urine Presumptive Negative Presumptive Negative  Presumptive Negative    Barbiturate Screen, Urine Presumptive Negative Presumptive Negative  Presumptive Negative    Codeine <50 ng/mL <50  <50    Hydromorphone Urine <25 ng/mL 127  200    Morphine  <50 ng/mL <50  <50           Assessment/Plan   Problem List Items Addressed This Visit           ICD-10-CM    Chronic lumbar radiculopathy - Primary M54.16    Relevant Medications    HYDROcodone-acetaminophen (Norco) 5-325 mg tablet (Start on 7/1/2025)    Other Relevant Orders    FL pain management    Transforaminal    Lumbar stenosis with neurogenic claudication M48.062    Relevant Medications    HYDROcodone-acetaminophen (Norco) 5-325 mg tablet (Start on 7/1/2025)    Other Relevant Orders    FL pain management    Transforaminal    Postlaminectomy syndrome of lumbar region M96.1    Relevant Medications    HYDROcodone-acetaminophen (Norco) 5-325 mg tablet (Start on 7/1/2025)    Other Relevant Orders    FL pain management    Transforaminal    Encounter for long-term use of opiate analgesic Z79.891    Relevant Medications    HYDROcodone-acetaminophen (Norco) 5-325 mg tablet (Start on 7/1/2025)     Narrative & Impression   Interpreted By:  Tomy Carlisle,   STUDY:  MR LUMBAR SPINE W AND WO IV CONTRAST;  4/7/2025 5:32 pm      INDICATION:  Signs/Symptoms:concern for caudia equina-recent lumbar laminectomy L1  T12 performed 1 month ago, pelvic numbness and rectal inconteince x  one week.          COMPARISON:  November 28, 2023      ACCESSION NUMBER(S):  [id number]      ORDERING CLINICIAN:  VALENTIN SERVIN      TECHNIQUE:  Multiplanar, multisequence MR imaging of the lumbar spine performed  prior to and  following administration of 20 ML Dotarem intravenous  contrast.      FINDINGS:  The L5-S1 level be defined as axial T2 series 8 image 21.      Recent postoperative changes are present at T12-L1 including a small  fluid collection in the surgical site.      Patient is status post prior posterior fusion of L2 through L5 and  disc spacers a presumed from L3-L4 through L5-S1.      Alignment: There is mild grade 1 retrolisthesis of L1 on L2.      Vertebrae: T1 marrow signal is normal. Vertebral body height is  maintained.      Discs: There varying degrees of loss of disc height and disc  desiccation.      Spinal cord: Signal within the spinal cord is normal. The conus  medullaris terminates at L1.      Spinal canal:  The AP diameter the spinal canal measures 10mm at  LL1-L2 secondary to degenerative change.      L1-L2: There is no significant lateral recess, neural foraminal  narrowing or spinal canal narrowing.      L2-L3: There is moderate left neural foraminal narrowing.      L3-L4: There is moderate left neural foraminal narrowing.      L4-L5: There is no significant lateral recess, neural foraminal  narrowing or spinal canal narrowing.      L5-S1: There is mild to moderate bilateral neural foraminal narrowing.      Soft tissues: The paraspinal soft tissues are normal. A very small  left renal cyst is present.      IMPRESSION:  1. The AP diameter spinal canal is most narrowed at L1-L2 where it  measures 10 mm in AP diameter.  2. A small amount of fluid remains in the operative site posterior  soft tissues. This is to be expected.      MACRO:  None     MR lumbar spine wo IV contrast  Order: [id number]  Impression    IMPRESSION:    There is new degenerative disc disease at L1/L2 with retrolisthesis of L1  on L2 and central disc extrusion with moderate canal stenosis and severe  left and moderate right neural foraminal stenosis.    Unchanged postoperative findings status post lumbar fusion and posterior  decompression  from L2 to L5.    Anatomic Lumbar Variant: None.  L4-5 is considered the level of the iliac  crest and assume there are 5 lumbar-type vertebrae.    : MALOU    Transcribe Date/Time: Dec 11 2024 11:07A    Dictated by : SAM TROTTER MD    This examination was interpreted and the report reviewed and  electronically signed by:  GUY DIGGS MD on Dec 11 2024  2:09PM  EST  Narrative    * * *Final Report* * *    DATE OF EXAM: Dec 11 2024 10:45AM      [facility]   0303  -  MRI LUMBAR SPINE WO IVCON  / ACCESSION #  [id number]    PROCEDURE REASON: Spinal stenosis of lumbar region without neurogenic  claudication        * * * * Physician Interpretation * * * *     EXAMINATION:  MRI LUMBAR SPINE WO IVCON    CLINICAL HISTORY:  Lumbar radiculopathy.  Prior lumbar.    TECHNIQUE: Routine lumbosacral spine MR protocol without gadolinium.    MQ:  [id number]    COMPARISON: MR lumbar spine 07/22/2022      RESULT:    Counting reference:  Lumbosacral junction.  For the purposes of this  report,  L4-5 is considered the level of the iliac crest and assume there  are 5 lumbar-type vertebrae.  Anatomic variant:  None.    Localizer images:  No additional findings.    Alignment:    There is new degenerative disc disease with moderate  intervertebral disc height loss of L1/L2 with retrolisthesis of L1 on L2.   Patient status post posterior lumbar fusion from L2 to L5 with placement  of interbody cages at L3/L4 and L4/L5.    Bone marrow signal/fracture:  No evidence of pathologic marrow  infiltration.  No evidence of prior fracture.    Conus:  The conus is within normal limits of signal intensity and  morphology.    Paraspinal soft tissues:   Paraspinal soft tissues are within normal  limits.    Lower thoracic spine:  Visualized lower thoracic canal and foramina are  patent.    L1-L2:    There is central disc extrusion slightly eccentric to the left  with resulting moderate spinal canal stenosis.  There is severe left and  moderate  right neural foraminal stenosis.    L2-L3:    Decompressed canal.  Foramina are patent.    L3-L4:    Decompressed canal.  Foramina are patent.    L4-L5:    Decompressed canal.  Foramina are patent.    L5-S1:    Decompressed canal.  Mild right and moderate left foraminal  stenosis.    Sacrum and iliac wings:   The visualized sacrum and iliac wings are  within normal limits.  Exam End: 12/11/24 10:45          Media Information          Plan/Follow-up Instructions:      Continue with your prescribed medications.    I sent 90 tabs of [place] to your pharmacy with a pickup date of 7/1/2025.  Continue taking 1 pill 3 times a day for pain relief.  Do not take sedating medications together.    ---Risks and side effects of chronic opioid therapy, including but not limited to tolerance, dependence, constipation, hyperalgesia, cognitive side effects, addiction, and possible death due to overuse and or misuse, were discussed.   Medications, when co-administered with other sedative agents, including but not limited to alcohol, benzodiazepines, sedatives or hypnotics, and illegal drugs, could pose life-threatening consequences, including death.   Such medication could impact if you have obstructive sleep apnea. I recommend that you continue using apnea devices if ordered by other physicians.   Being compliant with the treatment plan and the terms of the opioid agreement to effectively and safely treat the pain is important.  Being responsible with the medications and taking these only as prescribed, never in excess, and never for reasons other than pain reduction.   Keep the medications safe and locked away from children and other adults, as well as disposal methods and options. The patient understood the risks and instructions. ---    ---------------    Your next appointment is with Dr. Ching in:    [facility]    For pain block/injection on: 7/1/2025, left L4-L5 and L5-S1 transforaminal epidural steroid injection  #1    LOCAL     °If you are on Pain Medication, please bring it with you with every procedures or visits for a pill count.     °You will receive a phone call the weekday before your appointment/procedure.   °Please KEEP your scheduled appointments.     °BRING your photo ID, insurance information, and a correct list of your home medications to EVERY visit.    °Please call our office at [phone] if you have any questions.     You will then be seen for a postprocedure follow-up in 4 to 6 weeks in [place]  ---------------        --------------  Disclaimer: This note was created using voice recognition software. It was not corrected for typographical or grammatical errors, inadvertent word insertion, or any unintended errors. Please feel free to contact me for clarification.  --------------      Nilsa Haer DNP, APRN, FNP-C   [facility] [facility]  Office #: [phone]  Fax # [phone]

## 2025-06-23 NOTE — PROGRESS NOTES
Subjective   Rebecca Saenz is a 54 y.o. female who is here for follow-up visit.  Patient is ambulatory with the use of cane. Gait is steady.  She arrives by herself.    Patient continues to have left hip pain that goes down to her leg.  She rates her pain as 6 out of 10.  It is constant.  She describes it as aching, cramping, spastic, stabbing and tender kind of pain.  She continues to have numbness, pins-and-needles sensation that goes down to her left leg.  Patient had caudal BERENICE on 6/3/2025.  She reports she obtained more than 50% relief.  Before the injection she had  pain to both legs now the left leg is persistent that she has to use a cane to ambulate.  She denies increasing leg weakness or change in balance.  She denies bowel or bladder incontinence.  She denies recent falls or injuries.  There has been no changes since she was last seen.    History: Patient had L1-L2 laminectomy and facetectomies done on 3/11/2025 with Dr. Stephen at [facility].  She had few ED visits after her procedure due to adverse effects.  She had loss control of her bowel.  She had imaging done and they reviewed the results.  Patient reports that her surgeon wants her to follow-up with pain management for an injection.  She also has a referral for spinal stimulator.  Patient has history of L2 to L5 lumbar fusion with interbody cages at L3-L4 and L4-L5.    [facility].  She is on gabapentin and muscle relaxant.  She takes Norco prescribed by this office. She takes it 3 times a day and last dose was this morning.  She has 24 pills left her actual count. She continues to take Effexor for anxiety and depression.  She denies side effects to her medications.  She was doing physical therapy and continues to do her stretching exercises.    I reviewed previous notes, labs and imaging.  I discussed plan of care including pharmacologic and joint intervention procedure.  I discussed transforaminal  BERENICE.  She had left TFESI on 4/30/2024 at L1-L2 and L2-L3.  We will address L4-L5 and L5-S1.  She is in agreement to proceed to this procedure.  This is done under fluoroscopy.  [facility] will [facility]. Questions were answered during this encounter.    I have reviewed the OARRS report.  Last fill date of Norco was on 6/2/2025 where she received 90 tabs.  No suspicious activities.  Toxicology reports based on previous drug screens were consistent with no compliance issues.    The patient was counseled regarding diagnostic results, instructions for management, risk factor reductions, risks and benefits of treatment options and importance of compliance with treatment.      9/17/2024: C5-C6, C6-C7 anterior cervical discectomy and disc replacement with Dr. Stephen.  ----------------  PROCEDURES:  6/3/2025: Caudal BERENICE #1  6/25/2024: Cervical BERENICE #1  4/30/2024: Left L1-L2 and L2-L3 TFESI  2/20/2024: Caudal BERENICE  1/2/2024: Left L1-L2 and L2-L3 TFESI  --------    OARRS:  Nilsa Hare, APRN-CNP, DNP on 6/23/2025 11:46 AM  I have personally reviewed the OARRS report for Rebecca Saenz. I have considered the risks of abuse, dependence, addiction and diversion and I believe that it is clinically appropriate for Rebecca Saenz to be prescribed this medication    Review of Systems   Constitutional: Negative.    HENT: Negative.     Eyes: Negative.    Respiratory: Negative.     Cardiovascular: Negative.    Gastrointestinal: Negative.    Endocrine: Negative.    Genitourinary: Negative.    Musculoskeletal:  Positive for arthralgias, back pain and myalgias. Negative for gait problem, joint swelling, neck pain and neck stiffness.   Skin: Negative.    Allergic/Immunologic: Negative.    Neurological: Negative.    Psychiatric/Behavioral: Negative.            /87 (BP Location: Left arm, Patient Position: Sitting, BP Cuff Size: Adult)   Pulse 103   Temp 36.4 °C (97.6 °F) (Temporal)   Resp 16   Wt 102 kg (225 lb)   SpO2 100%   Body mass index is 31.38 kg/m².      Objective       Past Medical History  She has a past medical history of Arthritis, Cataract (2018), Depression, Headache, Hyperlipidemia, and Joint pain.    Surgical History  Past Surgical History:   Procedure Laterality Date    BACK SURGERY  05/19/2018    Back Surgery    CERVICAL DISCECTOMY  09/2024    C5-C7, CERVICAL DISC REPLACEMENT    EYE SURGERY  05/19/2018    Eye Surgery    HYSTERECTOMY  1998        Social History  She reports that she has quit smoking. Her smoking use included cigarettes. She has a 22.5 pack-year smoking history. She has never used smokeless tobacco. She reports current alcohol use. She reports that she does not use drugs.    Family History  Family History   Problem Relation Name Age of Onset    No Known Problems Mother      Arthritis Father [place]     Cancer Father [place]         Allergies  Codeine and Sulfamethoxazole    MEDICATIONS:    Current Outpatient Medications:     ascorbic acid (Vitamin C) 500 mg tablet, Take 1 tablet (500 mg) by mouth once daily., Disp: , Rfl:     calcium carbonate (CALCIUM 500 ORAL), Take by mouth., Disp: , Rfl:     cholecalciferol (Vitamin D3) 25 MCG (1000 UT) tablet, Take 1 tablet (25 mcg) by mouth once daily., Disp: , Rfl:     cyanocobalamin (Vitamin B-12) 1,000 mcg tablet, Take 100 mcg by mouth once daily., Disp: , Rfl:     cyclobenzaprine (Flexeril) 10 mg tablet, Take 1 tablet (10 mg) by mouth 3 times a day as needed for muscle spasms., Disp: 60 tablet, Rfl: 2    gabapentin (Neurontin) 600 mg tablet, Take 2 tablets (1,200 mg) by mouth 3 times a day., Disp: 180 tablet, Rfl: 0    garlic 500 mg capsule, Take by mouth., Disp: , Rfl:     methocarbamol (Robaxin) 750 mg tablet, Take 1 tablet (750 mg) by mouth 3 times a day., Disp: , Rfl:     naloxone (Narcan) 4 mg/0.1 mL nasal spray, Administer 1 spray (4 mg) into affected nostril(s) if needed for opioid reversal. CALL 911 first. May repeat every 2-3 minutes if needed, alternating  "nostrils, until medical assistance becomes available., Disp: 2 each, Rfl: 0    rOPINIRole (Requip) 1 mg tablet, Take 1 tablet (1 mg) by mouth once daily at bedtime., Disp: 90 tablet, Rfl: 1    venlafaxine XR (Effexor-XR) 37.5 mg 24 hr capsule, 1 capsule (37.5 mg)., Disp: , Rfl:     [START ON 7/1/2025] HYDROcodone-acetaminophen (Norco) 5-325 mg tablet, Take 1 tablet by mouth 3 times a day as needed for severe pain (7 - 10). Do not fill before July 1, 2025., Disp: 90 tablet, Rfl: 0    Physical Exam  Vitals and nursing note reviewed.   HENT:      Head: Normocephalic.      Nose: Nose normal.   Eyes:      Extraocular Movements: Extraocular movements intact.      Conjunctiva/sclera: Conjunctivae normal.      Pupils: Pupils are equal, round, and reactive to light.   Cardiovascular:      Rate and Rhythm: Normal rate and regular rhythm.   Pulmonary:      Effort: Pulmonary effort is normal.      Breath sounds: Normal breath sounds.   Genitourinary:     Comments: Deferred  Musculoskeletal:         General: Tenderness present. No swelling, deformity or signs of injury.      Cervical back: No rigidity or tenderness.      Right lower leg: No edema.      Left lower leg: No edema.      Comments: \"For full disclosure, patient was asked to pull up their garment to properly visualize the spine. This is done by the patient without me touching their garment.  The spine/joints were examined using gloves.\"    Positive left leg raise causes leg pain.   Positive for paraspinal tenderness at the lumbar region bilaterally at L4-L5, L5-S1 with slight rotation.  Positive for tactile hyperesthesia.  With left-sided radicular symptoms that follows this pathway.  Presence of a long scar at the lumbar spine from previous surgery.  Positive for tenderness on palpation at the left femoral hip joint.  BUE 5/5, BLE 4/5.    Skin:     General: Skin is warm and dry.   Neurological:      General: No focal deficit present.      Mental Status: She is alert " and oriented to person, place, and time.   Psychiatric:         Mood and Affect: Mood normal.         Behavior: Behavior normal.            Pain Management Panel  More data exists         Latest Ref Rng & Units 1/24/2025 8/6/2024   Pain Management Panel   Amphetamine Screen, Urine Presumptive Negative Presumptive Negative  Presumptive Negative    Barbiturate Screen, Urine Presumptive Negative Presumptive Negative  Presumptive Negative    Codeine <50 ng/mL <50  <50    Hydromorphone Urine <25 ng/mL 127  200    Morphine  <50 ng/mL <50  <50           Assessment/Plan   Problem List Items Addressed This Visit           ICD-10-CM    Chronic lumbar radiculopathy - Primary M54.16    Relevant Medications    HYDROcodone-acetaminophen (Norco) 5-325 mg tablet (Start on 7/1/2025)    Other Relevant Orders    FL pain management    Transforaminal    Lumbar stenosis with neurogenic claudication M48.062    Relevant Medications    HYDROcodone-acetaminophen (Norco) 5-325 mg tablet (Start on 7/1/2025)    Other Relevant Orders    FL pain management    Transforaminal    Postlaminectomy syndrome of lumbar region M96.1    Relevant Medications    HYDROcodone-acetaminophen (Norco) 5-325 mg tablet (Start on 7/1/2025)    Other Relevant Orders    FL pain management    Transforaminal    Encounter for long-term use of opiate analgesic Z79.891    Relevant Medications    HYDROcodone-acetaminophen (Norco) 5-325 mg tablet (Start on 7/1/2025)     Narrative & Impression   Interpreted By:  Tomy Carlisle,   STUDY:  MR LUMBAR SPINE W AND WO IV CONTRAST;  4/7/2025 5:32 pm      INDICATION:  Signs/Symptoms:concern for caudia equina-recent lumbar laminectomy L1  T12 performed 1 month ago, pelvic numbness and rectal inconteince x  one week.          COMPARISON:  November 28, 2023      ACCESSION NUMBER(S):  [id number]      ORDERING CLINICIAN:  VALENTIN SERVIN      TECHNIQUE:  Multiplanar, multisequence MR imaging of the lumbar spine performed  prior to and  following administration of 20 ML Dotare[place] intravenous  contrast.      FINDINGS:  The L5-S1 level be defined as axial T2 series 8 image 21.      Recent postoperative changes are present at T12-L1 including a small  fluid collection in the surgical site.      Patient is status post prior posterior fusion of L2 through L5 and  disc spacers a presumed from L3-L4 through L5-S1.      Alignment: There is mild grade 1 retrolisthesis of L1 on L2.      Vertebrae: T1 marrow signal is normal. Vertebral body height is  maintained.      Discs: There varying degrees of loss of disc height and disc  desiccation.      Spinal cord: Signal within the spinal cord is normal. The conus  medullaris terminates at L1.      Spinal canal:  The AP diameter the spinal canal measures 10mm at  LL1-L2 secondary to degenerative change.      L1-L2: There is no significant lateral recess, neural foraminal  narrowing or spinal canal narrowing.      L2-L3: There is moderate left neural foraminal narrowing.      L3-L4: There is moderate left neural foraminal narrowing.      L4-L5: There is no significant lateral recess, neural foraminal  narrowing or spinal canal narrowing.      L5-S1: There is mild to moderate bilateral neural foraminal narrowing.      Soft tissues: The paraspinal soft tissues are normal. A very small  left renal cyst is present.      IMPRESSION:  1. The AP diameter spinal canal is most narrowed at L1-L2 where it  measures 10 mm in AP diameter.  2. A small amount of fluid remains in the operative site posterior  soft tissues. This is to be expected.      MACRO:  None     MR lumbar spine wo IV contrast  Order: [id number]  Impression    IMPRESSION:    There is new degenerative disc disease at L1/L2 with retrolisthesis of L1  on L2 and central disc extrusion with moderate canal stenosis and severe  left and moderate right neural foraminal stenosis.    Unchanged postoperative findings status post lumbar fusion and posterior  decompression  from L2 to L5.    Anatomic Lumbar Variant: None.  L4-5 is considered the level of the iliac  crest and assume there are 5 lumbar-type vertebrae.    : MALOU    Transcribe Date/Time: Dec 11 2024 11:07A    Dictated by : SAM TROTTER MD    This examination was interpreted and the report reviewed and  electronically signed by:  GUY DIGSG MD on Dec 11 2024  2:09PM  EST  Narrative    * * *Final Report* * *    DATE OF EXAM: Dec 11 2024 10:45AM      [facility]   0303  -  MRI LUMBAR SPINE WO IVCON  / ACCESSION #  [id number]    PROCEDURE REASON: Spinal stenosis of lumbar region without neurogenic  claudication        * * * * Physician Interpretation * * * *     EXAMINATION:  MRI LUMBAR SPINE WO IVCON    CLINICAL HISTORY:  Lumbar radiculopathy.  Prior lumbar.    TECHNIQUE: Routine lumbosacral spine MR protocol without gadolinium.    MQ:  [id number]    COMPARISON: MR lumbar spine 07/22/2022      RESULT:    Counting reference:  Lumbosacral junction.  For the purposes of this  report,  L4-5 is considered the level of the iliac crest and assume there  are 5 lumbar-type vertebrae.  Anatomic variant:  None.    Localizer images:  No additional findings.    Alignment:    There is new degenerative disc disease with moderate  intervertebral disc height loss of L1/L2 with retrolisthesis of L1 on L2.   Patient status post posterior lumbar fusion from L2 to L5 with placement  of interbody cages at L3/L4 and L4/L5.    Bone marrow signal/fracture:  No evidence of pathologic marrow  infiltration.  No evidence of prior fracture.    Conus:  The conus is within normal limits of signal intensity and  morphology.    Paraspinal soft tissues:   Paraspinal soft tissues are within normal  limits.    Lower thoracic spine:  Visualized lower thoracic canal and foramina are  patent.    L1-L2:    There is central disc extrusion slightly eccentric to the left  with resulting moderate spinal canal stenosis.  There is severe left and  moderate  right neural foraminal stenosis.    L2-L3:    Decompressed canal.  Foramina are patent.    L3-L4:    Decompressed canal.  Foramina are patent.    L4-L5:    Decompressed canal.  Foramina are patent.    L5-S1:    Decompressed canal.  Mild right and moderate left foraminal  stenosis.    Sacrum and iliac wings:   The visualized sacrum and iliac wings are  within normal limits.  Exam End: 12/11/24 10:45          Media Information          Plan/Follow-up Instructions:      Continue with your prescribed medications.    I sent 90 tabs of [place] to your pharmacy with a pickup date of 7/1/2025.  Continue taking 1 pill 3 times a day for pain relief.  Do not take sedating medications together.    ---Risks and side effects of chronic opioid therapy, including but not limited to tolerance, dependence, constipation, hyperalgesia, cognitive side effects, addiction, and possible death due to overuse and or misuse, were discussed.   Medications, when co-administered with other sedative agents, including but not limited to alcohol, benzodiazepines, sedatives or hypnotics, and illegal drugs, could pose life-threatening consequences, including death.   Such medication could impact if you have obstructive sleep apnea. I recommend that you continue using apnea devices if ordered by other physicians.   Being compliant with the treatment plan and the terms of the opioid agreement to effectively and safely treat the pain is important.  Being responsible with the medications and taking these only as prescribed, never in excess, and never for reasons other than pain reduction.   Keep the medications safe and locked away from children and other adults, as well as disposal methods and options. The patient understood the risks and instructions. ---    ---------------    Your next appointment is with Dr. Ching in:    [facility]    For pain block/injection on: 7/1/2025, left L4-L5 and L5-S1 transforaminal epidural steroid injection  #1    LOCAL     °If you are on Pain Medication, please bring it with you with every procedures or visits for a pill count.     °You will receive a phone call the weekday before your appointment/procedure.   °Please KEEP your scheduled appointments.     °BRING your photo ID, insurance information, and a correct list of your home medications to EVERY visit.    °Please call our office at [phone] if you have any questions.     You will then be seen for a postprocedure follow-up in 4 to 6 weeks in [place]  ---------------        --------------  Disclaimer: This note was created using voice recognition software. It was not corrected for typographical or grammatical errors, inadvertent word insertion, or any unintended errors. Please feel free to contact me for clarification.  --------------      Nilsa Hare DNP, APRN, FNP-C   [facility]  Office #: [phone]  Fax # [phone]

## 2025-06-23 NOTE — PROGRESS NOTES
Date of the last Controlled Substance Agreement: 1/24/2025       Last urine drug screening date/ordered today: 5/21/2025     Results of last screen: as expected    Last opioid risk screening date/ordered today: 1/24/2025      Pain Scale Screening:   Pain Assessment and Documentation Tool (PADT)   Date of Assessment: 6/23/2025  Analgesia:   Patient reports her pain level on average during the past week is 6on a 0 - 10 scale.   Patient reports that her pain level at its worst during the past week was 8 on a 0 -10 scale.   50% of pain has been relieved during the past week per patient   Patient states that the amount of pain relief she is now obtaining from her current pain reliever(s) is enough to make a real difference in her life.   Query to clinician: Is the patient's pain relief clinically significant? yes  Activities of Daily Living:   Physical functioning: unchanged  Family relationships: unchanged  Social relationships: unchanged  Mood: worse  Sleep patterns: unchanged  Overall functioning: unchanged  Adverse Events: No, Rebecca Saenz is not experiencing side effects from current pain reliever.  Patients overall severity of side effect:none  Specific Analgesic Plan: Continue present regimen.

## 2025-07-01 ENCOUNTER — HOSPITAL ENCOUNTER (OUTPATIENT)
Dept: PAIN MEDICINE | Facility: HOSPITAL | Age: 55
Discharge: HOME | End: 2025-07-01
Payer: OTHER GOVERNMENT

## 2025-07-01 ENCOUNTER — APPOINTMENT (OUTPATIENT)
Dept: PAIN MEDICINE | Facility: HOSPITAL | Age: 55
End: 2025-07-01
Payer: OTHER GOVERNMENT

## 2025-07-01 VITALS
RESPIRATION RATE: 17 BRPM | OXYGEN SATURATION: 99 % | TEMPERATURE: 98.1 F | SYSTOLIC BLOOD PRESSURE: 107 MMHG | HEIGHT: 71 IN | DIASTOLIC BLOOD PRESSURE: 63 MMHG | BODY MASS INDEX: 32.2 KG/M2 | HEART RATE: 97 BPM | WEIGHT: 230 LBS

## 2025-07-01 DIAGNOSIS — M54.16 CHRONIC LUMBAR RADICULOPATHY: ICD-10-CM

## 2025-07-01 DIAGNOSIS — M96.1 POSTLAMINECTOMY SYNDROME OF LUMBAR REGION: ICD-10-CM

## 2025-07-01 DIAGNOSIS — M48.062 LUMBAR STENOSIS WITH NEUROGENIC CLAUDICATION: ICD-10-CM

## 2025-07-01 PROCEDURE — 64483 NJX AA&/STRD TFRM EPI L/S 1: CPT | Performed by: ANESTHESIOLOGY

## 2025-07-01 PROCEDURE — 2500000004 HC RX 250 GENERAL PHARMACY W/ HCPCS (ALT 636 FOR OP/ED): Performed by: ANESTHESIOLOGY

## 2025-07-01 PROCEDURE — 7100000009 HC PHASE TWO TIME - INITIAL BASE CHARGE

## 2025-07-01 PROCEDURE — 7100000010 HC PHASE TWO TIME - EACH INCREMENTAL 1 MINUTE

## 2025-07-01 PROCEDURE — 64484 NJX AA&/STRD TFRM EPI L/S EA: CPT | Performed by: ANESTHESIOLOGY

## 2025-07-01 PROCEDURE — 2550000001 HC RX 255 CONTRASTS: Performed by: ANESTHESIOLOGY

## 2025-07-01 RX ORDER — BUPIVACAINE HYDROCHLORIDE 2.5 MG/ML
INJECTION, SOLUTION EPIDURAL; INFILTRATION; INTRACAUDAL; PERINEURAL AS NEEDED
Status: COMPLETED | OUTPATIENT
Start: 2025-07-01 | End: 2025-07-01

## 2025-07-01 RX ORDER — LIDOCAINE HYDROCHLORIDE 10 MG/ML
INJECTION, SOLUTION EPIDURAL; INFILTRATION; INTRACAUDAL; PERINEURAL AS NEEDED
Status: COMPLETED | OUTPATIENT
Start: 2025-07-01 | End: 2025-07-01

## 2025-07-01 RX ORDER — BETAMETHASONE SODIUM PHOSPHATE AND BETAMETHASONE ACETATE 3; 3 MG/ML; MG/ML
INJECTION, SUSPENSION INTRA-ARTICULAR; INTRALESIONAL; INTRAMUSCULAR; SOFT TISSUE AS NEEDED
Status: COMPLETED | OUTPATIENT
Start: 2025-07-01 | End: 2025-07-01

## 2025-07-01 RX ADMIN — BETAMETHASONE SODIUM PHOSPHATE AND BETAMETHASONE ACETATE 6 MG: 3; 3 INJECTION, SUSPENSION INTRA-ARTICULAR; INTRALESIONAL; INTRAMUSCULAR at 13:04

## 2025-07-01 RX ADMIN — LIDOCAINE HYDROCHLORIDE 4 ML: 10 INJECTION, SOLUTION EPIDURAL; INFILTRATION; INTRACAUDAL; PERINEURAL at 13:03

## 2025-07-01 RX ADMIN — IOHEXOL 3 ML: 240 INJECTION, SOLUTION INTRATHECAL; INTRAVASCULAR; INTRAVENOUS; ORAL at 13:04

## 2025-07-01 RX ADMIN — BUPIVACAINE HYDROCHLORIDE 5 ML: 2.5 INJECTION, SOLUTION EPIDURAL; INFILTRATION; INTRACAUDAL; PERINEURAL at 13:03

## 2025-07-01 ASSESSMENT — COLUMBIA-SUICIDE SEVERITY RATING SCALE - C-SSRS
2. HAVE YOU ACTUALLY HAD ANY THOUGHTS OF KILLING YOURSELF?: NO
1. IN THE PAST MONTH, HAVE YOU WISHED YOU WERE DEAD OR WISHED YOU COULD GO TO SLEEP AND NOT WAKE UP?: NO
6. HAVE YOU EVER DONE ANYTHING, STARTED TO DO ANYTHING, OR PREPARED TO DO ANYTHING TO END YOUR LIFE?: NO

## 2025-07-01 ASSESSMENT — PAIN SCALES - GENERAL
PAINLEVEL_OUTOF10: 0 - NO PAIN
PAINLEVEL_OUTOF10: 5 - MODERATE PAIN

## 2025-07-01 ASSESSMENT — PAIN - FUNCTIONAL ASSESSMENT
PAIN_FUNCTIONAL_ASSESSMENT: 0-10
PAIN_FUNCTIONAL_ASSESSMENT: 0-10

## 2025-07-01 NOTE — DISCHARGE INSTRUCTIONS
Discharge Instructions:   ° Keep Band-Aid on for the next 24 hours.    ° No showering/bathing for the next 24 hours.    ° You may notice soreness or increased pain in the area of your injection, which may continue for the first 48 hours.    ° Avoid driving or operating any heavy machinery until the next day after the procedure.  ° You should resume any medications and your regular diet after the procedure.  ° You may resume regular daily activity but should avoid strenuous activity the day of the procedure.  Some of the side affects you may experience from the steroids are:  ° Insomnia (inability to sleep)  ° Increased sweating  ° Headaches  ° Increased fluid retention (swelling of your extremities)  ° Increase appetite  ° Face flushing  ° If you are a diabetic, your blood sugars may go up.  Closely monitor your diet.  Your blood sugar should return to normal in a few days.  Complications:   ° Complications are rare with the most common being temporary increase pain near the injection site. You can apply ice to affected area on the day of the procedure.   ° If the discomfort persists, apply moist heat to the area. Serious complications are very uncommon but may include bleeding, infection or nerve damage.   ° If severe pain, fever, redness or swelling near the injection site, have someone take you to the nearest emergency room to be evaluated for procedure complications or infection.  Expectations:   ° Local anesthetics wear off in several hours but duration of relief varies from individual to individual.     If you have any questions, please call the office at [phone].    If this is an emergency, call 911 or go to your nearest hospital.

## 2025-07-02 ASSESSMENT — PAIN SCALES - GENERAL: PAINLEVEL_OUTOF10: 0 - NO PAIN

## 2025-08-01 ENCOUNTER — OFFICE VISIT (OUTPATIENT)
Dept: PAIN MEDICINE | Facility: HOSPITAL | Age: 55
End: 2025-08-01
Payer: OTHER GOVERNMENT

## 2025-08-01 VITALS
DIASTOLIC BLOOD PRESSURE: 88 MMHG | HEIGHT: 71 IN | SYSTOLIC BLOOD PRESSURE: 124 MMHG | WEIGHT: 225 LBS | TEMPERATURE: 97 F | BODY MASS INDEX: 31.5 KG/M2 | RESPIRATION RATE: 20 BRPM | OXYGEN SATURATION: 100 %

## 2025-08-01 DIAGNOSIS — M48.062 LUMBAR STENOSIS WITH NEUROGENIC CLAUDICATION: ICD-10-CM

## 2025-08-01 DIAGNOSIS — Z79.891 ENCOUNTER FOR LONG-TERM USE OF OPIATE ANALGESIC: ICD-10-CM

## 2025-08-01 DIAGNOSIS — M54.16 CHRONIC LUMBAR RADICULOPATHY: ICD-10-CM

## 2025-08-01 DIAGNOSIS — M96.1 POSTLAMINECTOMY SYNDROME OF LUMBAR REGION: ICD-10-CM

## 2025-08-01 DIAGNOSIS — M54.16 LUMBAR RADICULOPATHY, CHRONIC: Primary | ICD-10-CM

## 2025-08-01 PROCEDURE — 99213 OFFICE O/P EST LOW 20 MIN: CPT | Performed by: ANESTHESIOLOGY

## 2025-08-01 RX ORDER — HYDROCODONE BITARTRATE AND ACETAMINOPHEN 5; 325 MG/1; MG/1
1 TABLET ORAL 3 TIMES DAILY PRN
Qty: 75 TABLET | Refills: 0 | Status: SHIPPED | OUTPATIENT
Start: 2025-08-05

## 2025-08-01 ASSESSMENT — PATIENT HEALTH QUESTIONNAIRE - PHQ9
1. LITTLE INTEREST OR PLEASURE IN DOING THINGS: NOT AT ALL
SUM OF ALL RESPONSES TO PHQ9 QUESTIONS 1 AND 2: 0
2. FEELING DOWN, DEPRESSED OR HOPELESS: NOT AT ALL

## 2025-08-01 ASSESSMENT — PAIN SCALES - GENERAL: PAINLEVEL_OUTOF10: 7

## 2025-08-01 NOTE — PROGRESS NOTES
Patient is here for follow up after her left l4-l5, l5-s1 transforaminal epidural. Patient says her injection helped for 3 weeks. She is still taking her norco as ordered, her last dose was 08/01/2025. Rebecca states her physical functioning, mood, sleep, and overall functioning have improved since her injection. She states injection improved both leg pain and muscle spasms.  Patient has 21 norco remaining.        Date of the last Controlled Substance Agreement: 01/24/2025       Last urine drug screening date/ordered today: 05/21/2025     Results of last screen:     Last opioid risk screening date/ordered today: 01/24/2025      Pain Scale Screening:   Pain Assessment and Documentation Tool (PADT)   Date of Assessment: 08/01/2025  Analgesia:   Patient reports her pain level on average during the past week is 6on a 0 - 10 scale.   Patient reports that her pain level at its worst during the past week was 8 on a 0 -10 scale.   80% of pain has been relieved during the past week per patient   Patient states that the amount of pain relief she is now obtaining from her current pain reliever(s) is enough to make a real difference in her life.   Query to clinician: Is the patient's pain relief clinically significant? yes  Activities of Daily Living:   Physical functioning: better  Family relationships: unchanged  Social relationships: unchanged  Mood: better  Sleep patterns: better  Overall functioning: better  Adverse Events: No, Rebecca RAJESH Saenz is not experiencing side effects from current pain reliever.  Patients overall severity of side effect:***  Specific Analgesic Plan: Continue present regimen.

## 2025-08-01 NOTE — H&P
History Of Present Illness  Rebecca Saenz is a 54 y.o. female presenting with a chief complaint of lumbosacral radiculopathy.  Patient has had previous lumbar laminectomy and x 2 and fusion who suffers from chronic pain syndrome.  She takes Norco 5/325 mg tablets 3 times a day patient had 80% pain relief after having a transforaminal BERENICE done on 7/1/2025.  [facility] report shows her active cumulative morphine equivalent of 13 with 21 pills remaining.  I recommended that she decrease down to 2 tablets a day when her pain is improved.  She states she had the best 3 weeks of her life since her back surgeries after her transforaminal BERENICE was done on 7/1/2025 with her over 80% pain relief.  Patient is requesting a repeat procedure on 8/12/2025 for a second left L4-5 L5-S1 transforaminal BERENICE with fluoroscopy.     Past Medical History  She has a past medical history of Arthritis, Cataract (2018), Cervical disc disorder, Depression, Headache, Hyperlipidemia, Joint pain, Low back pain, and Lumbosacral disc disease.    Surgical History  She has a past surgical history that includes Back surgery (05/19/2018); Eye surgery (05/19/2018); Hysterectomy (1998); and Cervical discectomy (09/2024).     Social History  She reports that she has quit smoking. Her smoking use included cigarettes. She has a 22.5 pack-year smoking history. She has never used smokeless tobacco. She reports current alcohol use. She reports that she does not use drugs.    Family History  Family History[1]     Allergies  Codeine and Sulfamethoxazole    Review of Systems  Unremarkable except for chief complaint  Physical Exam  Gen. appearance:  Patient's alert and oriented ×3 ;cooperative mild to moderate distress  Heart: Regular rate and rhythm  Lungs: Clear to auscultation  Abdomen: Soft nontender  Neuro: Cranial nerves II -XII intact; DTR: +2 over 4 biceps triceps brachial radialis patellar and Achilles  Spinal exam: Positive paraspinal tenderness noted  bilaterally L4 5 L5-S1 with flexion extension and rotation/no bony abnormalities  Musculoskeletal:  Upper and lower extremity strength was 4/5 bilaterally  :  deferred  Skin: Warm and dry    Last Recorded Vitals  /88   Temp 36.1 °C (97 °F)   Resp 20   Wt 102 kg (225 lb)   SpO2 100%     ASSESSMENT:  1.  Post lumbar laminectomy syndrome  2.  Lumbosacral radiculopathy  3.  Continuous opioid use for pain control  4.  Cervical radiculopathy   PLAN:  Risk and benefits of a left L4-5 L5-S1 transforaminal BERENICE was discussed with the patient and scheduled for August 12, 2025 under local anesthesia.  All questions were answered at the time of his      Malick Ching DO         [1]   Family History  Problem Relation Name Age of Onset    Migraines Mother Padmini Patricia     Arthritis Father Damaris     Cancer Father Damaris     Alcohol abuse Father Damaris

## 2025-08-05 ENCOUNTER — APPOINTMENT (OUTPATIENT)
Dept: PAIN MEDICINE | Facility: HOSPITAL | Age: 55
End: 2025-08-05
Payer: OTHER GOVERNMENT

## 2025-08-12 ENCOUNTER — HOSPITAL ENCOUNTER (OUTPATIENT)
Dept: PAIN MEDICINE | Facility: HOSPITAL | Age: 55
Discharge: HOME | End: 2025-08-12
Payer: OTHER GOVERNMENT

## 2025-08-12 VITALS
OXYGEN SATURATION: 100 % | DIASTOLIC BLOOD PRESSURE: 98 MMHG | HEART RATE: 88 BPM | SYSTOLIC BLOOD PRESSURE: 135 MMHG | TEMPERATURE: 97.9 F | BODY MASS INDEX: 30.8 KG/M2 | RESPIRATION RATE: 16 BRPM | WEIGHT: 220 LBS | HEIGHT: 71 IN

## 2025-08-12 DIAGNOSIS — M48.062 LUMBAR STENOSIS WITH NEUROGENIC CLAUDICATION: ICD-10-CM

## 2025-08-12 DIAGNOSIS — M54.16 LUMBAR RADICULOPATHY, CHRONIC: ICD-10-CM

## 2025-08-12 PROCEDURE — 64483 NJX AA&/STRD TFRM EPI L/S 1: CPT | Performed by: ANESTHESIOLOGY

## 2025-08-12 PROCEDURE — 2550000001 HC RX 255 CONTRASTS: Performed by: ANESTHESIOLOGY

## 2025-08-12 PROCEDURE — 7100000010 HC PHASE TWO TIME - EACH INCREMENTAL 1 MINUTE

## 2025-08-12 PROCEDURE — 64484 NJX AA&/STRD TFRM EPI L/S EA: CPT | Performed by: ANESTHESIOLOGY

## 2025-08-12 PROCEDURE — 2500000004 HC RX 250 GENERAL PHARMACY W/ HCPCS (ALT 636 FOR OP/ED): Performed by: ANESTHESIOLOGY

## 2025-08-12 PROCEDURE — 7100000009 HC PHASE TWO TIME - INITIAL BASE CHARGE

## 2025-08-12 RX ORDER — BUPIVACAINE HYDROCHLORIDE 2.5 MG/ML
INJECTION, SOLUTION EPIDURAL; INFILTRATION; INTRACAUDAL; PERINEURAL AS NEEDED
Status: COMPLETED | OUTPATIENT
Start: 2025-08-12 | End: 2025-08-12

## 2025-08-12 RX ORDER — BETAMETHASONE SODIUM PHOSPHATE AND BETAMETHASONE ACETATE 3; 3 MG/ML; MG/ML
INJECTION, SUSPENSION INTRA-ARTICULAR; INTRALESIONAL; INTRAMUSCULAR; SOFT TISSUE AS NEEDED
Status: COMPLETED | OUTPATIENT
Start: 2025-08-12 | End: 2025-08-12

## 2025-08-12 RX ORDER — LIDOCAINE HYDROCHLORIDE 10 MG/ML
INJECTION, SOLUTION EPIDURAL; INFILTRATION; INTRACAUDAL; PERINEURAL AS NEEDED
Status: COMPLETED | OUTPATIENT
Start: 2025-08-12 | End: 2025-08-12

## 2025-08-12 RX ADMIN — LIDOCAINE HYDROCHLORIDE 10 ML: 10 INJECTION, SOLUTION EPIDURAL; INFILTRATION; INTRACAUDAL; PERINEURAL at 11:59

## 2025-08-12 RX ADMIN — IOHEXOL 3 ML: 240 INJECTION, SOLUTION INTRATHECAL; INTRAVASCULAR; INTRAVENOUS; ORAL at 12:00

## 2025-08-12 RX ADMIN — BUPIVACAINE HYDROCHLORIDE 5 ML: 2.5 INJECTION, SOLUTION EPIDURAL; INFILTRATION; INTRACAUDAL; PERINEURAL at 11:59

## 2025-08-12 RX ADMIN — BETAMETHASONE SODIUM PHOSPHATE AND BETAMETHASONE ACETATE 12 MG: 3; 3 INJECTION, SUSPENSION INTRA-ARTICULAR; INTRALESIONAL; INTRAMUSCULAR at 12:00

## 2025-08-12 ASSESSMENT — PAIN - FUNCTIONAL ASSESSMENT
PAIN_FUNCTIONAL_ASSESSMENT: 0-10
PAIN_FUNCTIONAL_ASSESSMENT: 0-10

## 2025-08-12 ASSESSMENT — PAIN SCALES - GENERAL
PAINLEVEL_OUTOF10: 0 - NO PAIN
PAINLEVEL_OUTOF10: 7

## 2025-08-13 ASSESSMENT — PAIN SCALES - GENERAL: PAINLEVEL_OUTOF10: 0 - NO PAIN
